# Patient Record
Sex: FEMALE | Race: WHITE | HISPANIC OR LATINO | Employment: FULL TIME | URBAN - METROPOLITAN AREA
[De-identification: names, ages, dates, MRNs, and addresses within clinical notes are randomized per-mention and may not be internally consistent; named-entity substitution may affect disease eponyms.]

---

## 2018-11-19 ENCOUNTER — TRANSCRIBE ORDERS (OUTPATIENT)
Dept: ADMINISTRATIVE | Facility: HOSPITAL | Age: 38
End: 2018-11-19

## 2018-11-19 DIAGNOSIS — N60.01 BREAST CYST, RIGHT: ICD-10-CM

## 2018-11-19 DIAGNOSIS — N63.0 BREAST LUMP: Primary | ICD-10-CM

## 2018-11-23 ENCOUNTER — HOSPITAL ENCOUNTER (OUTPATIENT)
Dept: ULTRASOUND IMAGING | Facility: CLINIC | Age: 38
Discharge: HOME | End: 2018-11-23
Payer: OTHER GOVERNMENT

## 2018-11-23 ENCOUNTER — HOSPITAL ENCOUNTER (OUTPATIENT)
Dept: MAMMOGRAPHY | Facility: CLINIC | Age: 38
Discharge: HOME | End: 2018-11-23
Payer: OTHER GOVERNMENT

## 2018-11-23 VITALS — HEIGHT: 64 IN | BODY MASS INDEX: 21.68 KG/M2 | WEIGHT: 127 LBS

## 2018-11-23 DIAGNOSIS — N63.0 BREAST LUMP: ICD-10-CM

## 2018-11-23 PROCEDURE — G0279 TOMOSYNTHESIS, MAMMO: HCPCS

## 2018-11-23 PROCEDURE — 76642 ULTRASOUND BREAST LIMITED: CPT

## 2018-11-23 PROCEDURE — 77066 DX MAMMO INCL CAD BI: CPT

## 2019-03-29 ENCOUNTER — OFFICE VISIT (OUTPATIENT)
Dept: URGENT CARE | Age: 39
End: 2019-03-29
Payer: COMMERCIAL

## 2019-03-29 VITALS
HEIGHT: 64 IN | WEIGHT: 133 LBS | TEMPERATURE: 97.7 F | HEART RATE: 74 BPM | BODY MASS INDEX: 22.71 KG/M2 | DIASTOLIC BLOOD PRESSURE: 71 MMHG | SYSTOLIC BLOOD PRESSURE: 125 MMHG | OXYGEN SATURATION: 100 %

## 2019-03-29 DIAGNOSIS — N63.0 BREAST LUMP IN FEMALE: Primary | ICD-10-CM

## 2019-03-29 PROCEDURE — S9088 SERVICES PROVIDED IN URGENT: HCPCS | Performed by: FAMILY MEDICINE

## 2019-03-29 PROCEDURE — 99212 OFFICE O/P EST SF 10 MIN: CPT | Performed by: FAMILY MEDICINE

## 2019-03-29 NOTE — PATIENT INSTRUCTIONS
Follow-up with gynecologist on 04/09/2019 is scheduled    Keep a diary of your symptoms in relation to her menstrual period

## 2019-03-30 NOTE — PROGRESS NOTES
[id number]        NAME: Regina Hernández is a 41 y o  female  : 1980    MRN: [id number]  DATE: 2019  TIME: 8:23 PM    Assessment and Plan   Breast lump in female [N63 0]  1  Breast lump in female           Patient Instructions       Follow up with PCP in 3-5 days  Proceed to  ER if symptoms worsen  Chief Complaint     Chief Complaint   Patient presents with    Mass     Lump on right breast with pain (only with pressure) and redness on the skin and swollen, noticed around 3-4 months ago  History of Present Illness       Patient is here for evaluation of persistent breast lump in the right breast   She states the other day was more swollen and tender but has gone down since  She has had this off and on since November  Patient did have an ultrasound the time and was supposed to follow-up in 6 months for another ultrasound  She states it did come back again and at this time it is no longer swollen or red or tender  Review of Systems   Review of Systems   Constitutional: Negative  Endocrine: Negative  Genitourinary: Negative  Hematological: Negative  Current Medications     No current outpatient medications on file  Current Allergies     Allergies as of 2019    (No Known Allergies)            The following portions of the patient's history were reviewed and updated as appropriate: allergies, current medications, past family history, past medical history, past social history, past surgical history and problem list      History reviewed  No pertinent past medical history  History reviewed  No pertinent surgical history  Family History   Problem Relation Age of Onset    Breast cancer Maternal Aunt 54         Medications have been verified          Objective   /71 (BP Location: Left arm, Patient Position: Sitting, Cuff Size: Standard)   Pulse 74   Temp 97 7 °F (36 5 °C)   Ht 5' 4" (1 626 m)   Wt 60 3 kg (133 lb)   SpO2 100%   BMI 22 83 kg/m²        Physical Exam     Physical Exam   Constitutional: She is oriented to person, place, and time  She appears well-developed  No distress  Neurological: She is alert and oriented to person, place, and time  Skin: Skin is warm and dry  She is not diaphoretic  Psychiatric: She has a normal mood and affect  Her behavior is normal    Nursing note and vitals reviewed

## 2019-04-08 ENCOUNTER — TELEPHONE (OUTPATIENT)
Dept: PSYCHIATRY | Facility: CLINIC | Age: 39
End: 2019-04-08

## 2019-05-06 ENCOUNTER — OFFICE VISIT (OUTPATIENT)
Dept: BEHAVIORAL/MENTAL HEALTH CLINIC | Facility: CLINIC | Age: 39
End: 2019-05-06
Payer: COMMERCIAL

## 2019-05-06 DIAGNOSIS — F32.0 CURRENT MILD EPISODE OF MAJOR DEPRESSIVE DISORDER WITHOUT PRIOR EPISODE (HCC): ICD-10-CM

## 2019-05-06 DIAGNOSIS — F41.0 PANIC DISORDER: ICD-10-CM

## 2019-05-06 DIAGNOSIS — F41.1 GAD (GENERALIZED ANXIETY DISORDER): Primary | ICD-10-CM

## 2019-05-06 PROCEDURE — 90792 PSYCH DIAG EVAL W/MED SRVCS: CPT | Performed by: PSYCHIATRY & NEUROLOGY

## 2019-05-06 RX ORDER — ESCITALOPRAM OXALATE 5 MG/1
5 TABLET ORAL DAILY
Qty: 45 TABLET | Refills: 0 | Status: SHIPPED | OUTPATIENT
Start: 2019-05-06 | End: 2019-06-03 | Stop reason: SDUPTHER

## 2019-05-06 RX ORDER — HYDROXYZINE HYDROCHLORIDE 25 MG/1
25 TABLET, FILM COATED ORAL 2 TIMES DAILY PRN
Qty: 20 TABLET | Refills: 0 | Status: SHIPPED | OUTPATIENT
Start: 2019-05-06 | End: 2019-12-24 | Stop reason: ALTCHOICE

## 2019-05-13 ENCOUNTER — HOSPITAL ENCOUNTER (OUTPATIENT)
Dept: ULTRASOUND IMAGING | Facility: CLINIC | Age: 39
Discharge: HOME | End: 2019-05-13
Payer: OTHER GOVERNMENT

## 2019-05-13 VITALS — WEIGHT: 133 LBS | BODY MASS INDEX: 22.71 KG/M2 | HEIGHT: 64 IN

## 2019-05-13 DIAGNOSIS — N60.01 BREAST CYST, RIGHT: ICD-10-CM

## 2019-05-13 PROCEDURE — 76642 ULTRASOUND BREAST LIMITED: CPT

## 2019-06-03 ENCOUNTER — OFFICE VISIT (OUTPATIENT)
Dept: BEHAVIORAL/MENTAL HEALTH CLINIC | Facility: CLINIC | Age: 39
End: 2019-06-03
Payer: COMMERCIAL

## 2019-06-03 DIAGNOSIS — F32.0 CURRENT MILD EPISODE OF MAJOR DEPRESSIVE DISORDER WITHOUT PRIOR EPISODE (HCC): ICD-10-CM

## 2019-06-03 DIAGNOSIS — F41.1 GAD (GENERALIZED ANXIETY DISORDER): Primary | ICD-10-CM

## 2019-06-03 DIAGNOSIS — F41.0 PANIC DISORDER: ICD-10-CM

## 2019-06-03 PROCEDURE — 99213 OFFICE O/P EST LOW 20 MIN: CPT | Performed by: PSYCHIATRY & NEUROLOGY

## 2019-06-03 RX ORDER — CLONAZEPAM 0.5 MG/1
0.5 TABLET ORAL DAILY PRN
Qty: 30 TABLET | Refills: 0 | Status: SHIPPED | OUTPATIENT
Start: 2019-06-03 | End: 2019-08-12 | Stop reason: SDUPTHER

## 2019-06-03 RX ORDER — ESCITALOPRAM OXALATE 10 MG/1
10 TABLET ORAL DAILY
Qty: 30 TABLET | Refills: 0 | Status: SHIPPED | OUTPATIENT
Start: 2019-06-03 | End: 2019-07-10 | Stop reason: SDUPTHER

## 2019-07-10 DIAGNOSIS — F41.0 PANIC DISORDER: ICD-10-CM

## 2019-07-10 DIAGNOSIS — F41.1 GAD (GENERALIZED ANXIETY DISORDER): ICD-10-CM

## 2019-07-10 DIAGNOSIS — F32.0 CURRENT MILD EPISODE OF MAJOR DEPRESSIVE DISORDER WITHOUT PRIOR EPISODE (HCC): ICD-10-CM

## 2019-07-10 RX ORDER — ESCITALOPRAM OXALATE 10 MG/1
10 TABLET ORAL DAILY
Qty: 30 TABLET | Refills: 0 | Status: SHIPPED | OUTPATIENT
Start: 2019-07-10 | End: 2019-08-12

## 2019-07-10 NOTE — TELEPHONE ENCOUNTER
Pt called to ask when her next appt is  She actually missed appt on 7/1, did not get a reminder and apologizes  She rescheduled 8/12 but will need lexapro refilled

## 2019-08-12 ENCOUNTER — OFFICE VISIT (OUTPATIENT)
Dept: BEHAVIORAL/MENTAL HEALTH CLINIC | Facility: CLINIC | Age: 39
End: 2019-08-12
Payer: COMMERCIAL

## 2019-08-12 DIAGNOSIS — F41.0 PANIC DISORDER: ICD-10-CM

## 2019-08-12 DIAGNOSIS — F32.0 CURRENT MILD EPISODE OF MAJOR DEPRESSIVE DISORDER WITHOUT PRIOR EPISODE (HCC): ICD-10-CM

## 2019-08-12 DIAGNOSIS — F41.1 GAD (GENERALIZED ANXIETY DISORDER): Primary | ICD-10-CM

## 2019-08-12 PROCEDURE — 99213 OFFICE O/P EST LOW 20 MIN: CPT | Performed by: PSYCHIATRY & NEUROLOGY

## 2019-08-12 RX ORDER — CLONAZEPAM 0.5 MG/1
0.5 TABLET ORAL DAILY PRN
Qty: 30 TABLET | Refills: 0 | Status: SHIPPED | OUTPATIENT
Start: 2019-08-12 | End: 2019-12-24 | Stop reason: SDUPTHER

## 2019-08-12 RX ORDER — ESCITALOPRAM OXALATE 10 MG/1
15 TABLET ORAL DAILY
Qty: 45 TABLET | Refills: 1 | Status: SHIPPED | OUTPATIENT
Start: 2019-08-12 | End: 2019-12-24 | Stop reason: SDUPTHER
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
## 2019-09-13 DIAGNOSIS — F41.0 PANIC DISORDER: ICD-10-CM

## 2019-09-13 DIAGNOSIS — F41.1 GAD (GENERALIZED ANXIETY DISORDER): ICD-10-CM

## 2019-12-16 ENCOUNTER — TELEPHONE (OUTPATIENT)
Dept: FAMILY MEDICINE CLINIC | Facility: CLINIC | Age: 39
End: 2019-12-16

## 2019-12-16 NOTE — TELEPHONE ENCOUNTER
She should maite refills from her current PCP as I am not allowed to refill meds for patients I havent seen before

## 2019-12-16 NOTE — TELEPHONE ENCOUNTER
Patient called and stated that she has a NP appointment on 2/3 with Soumya Thomas but she only has about 7 days left of her escitalopram (LEXAPRO) 10 mg tablet     That Dr Mayank Sanders prescribed, she stated that she she showed up to her appt with Lakeshia Beal but they told her that she just left and they didn't have a return date  She wanted to know if it would be safe to just stop the medication or if there was a way that Dr Love Headley can put in one refill till she is seen or if there was a way Dr Love Headley can squeeze her in sooner   Please Edmond Coates

## 2019-12-17 NOTE — TELEPHONE ENCOUNTER
Patient notified that you cannot fill her script, she said that she does not currently have a PCP  She said that she had an appt on 11/21 but it was cancelled by us   I rescheduled patient for 12/24 @ 8am

## 2019-12-24 ENCOUNTER — OFFICE VISIT (OUTPATIENT)
Dept: FAMILY MEDICINE CLINIC | Facility: CLINIC | Age: 39
End: 2019-12-24
Payer: COMMERCIAL

## 2019-12-24 ENCOUNTER — APPOINTMENT (OUTPATIENT)
Dept: LAB | Facility: CLINIC | Age: 39
End: 2019-12-24
Payer: COMMERCIAL

## 2019-12-24 VITALS
DIASTOLIC BLOOD PRESSURE: 80 MMHG | OXYGEN SATURATION: 99 % | TEMPERATURE: 97.2 F | RESPIRATION RATE: 16 BRPM | HEART RATE: 57 BPM | WEIGHT: 142.6 LBS | HEIGHT: 64 IN | SYSTOLIC BLOOD PRESSURE: 100 MMHG | BODY MASS INDEX: 24.34 KG/M2

## 2019-12-24 DIAGNOSIS — Z00.00 ANNUAL PHYSICAL EXAM: ICD-10-CM

## 2019-12-24 DIAGNOSIS — F32.0 CURRENT MILD EPISODE OF MAJOR DEPRESSIVE DISORDER WITHOUT PRIOR EPISODE (HCC): ICD-10-CM

## 2019-12-24 DIAGNOSIS — F41.1 GAD (GENERALIZED ANXIETY DISORDER): ICD-10-CM

## 2019-12-24 DIAGNOSIS — F41.0 PANIC DISORDER: ICD-10-CM

## 2019-12-24 DIAGNOSIS — Z00.00 ANNUAL PHYSICAL EXAM: Primary | ICD-10-CM

## 2019-12-24 LAB
ALBUMIN SERPL BCP-MCNC: 3.8 G/DL (ref 3.5–5)
ALP SERPL-CCNC: 51 U/L (ref 46–116)
ALT SERPL W P-5'-P-CCNC: 20 U/L (ref 12–78)
ANION GAP SERPL CALCULATED.3IONS-SCNC: 4 MMOL/L (ref 4–13)
AST SERPL W P-5'-P-CCNC: 8 U/L (ref 5–45)
BASOPHILS # BLD AUTO: 0.03 THOUSANDS/ΜL (ref 0–0.1)
BASOPHILS NFR BLD AUTO: 1 % (ref 0–1)
BILIRUB SERPL-MCNC: 0.44 MG/DL (ref 0.2–1)
BUN SERPL-MCNC: 23 MG/DL (ref 5–25)
CALCIUM SERPL-MCNC: 8.4 MG/DL (ref 8.3–10.1)
CHLORIDE SERPL-SCNC: 109 MMOL/L (ref 100–108)
CHOLEST SERPL-MCNC: 151 MG/DL (ref 50–200)
CO2 SERPL-SCNC: 28 MMOL/L (ref 21–32)
CREAT SERPL-MCNC: 0.82 MG/DL (ref 0.6–1.3)
EOSINOPHIL # BLD AUTO: 0.13 THOUSAND/ΜL (ref 0–0.61)
EOSINOPHIL NFR BLD AUTO: 3 % (ref 0–6)
ERYTHROCYTE [DISTWIDTH] IN BLOOD BY AUTOMATED COUNT: 12.3 % (ref 11.6–15.1)
GFR SERPL CREATININE-BSD FRML MDRD: 91 ML/MIN/1.73SQ M
GLUCOSE P FAST SERPL-MCNC: 96 MG/DL (ref 65–99)
HCT VFR BLD AUTO: 38.5 % (ref 34.8–46.1)
HDLC SERPL-MCNC: 72 MG/DL
HGB BLD-MCNC: 12.2 G/DL (ref 11.5–15.4)
IMM GRANULOCYTES # BLD AUTO: 0.01 THOUSAND/UL (ref 0–0.2)
IMM GRANULOCYTES NFR BLD AUTO: 0 % (ref 0–2)
LDLC SERPL CALC-MCNC: 65 MG/DL (ref 0–100)
LYMPHOCYTES # BLD AUTO: 1.78 THOUSANDS/ΜL (ref 0.6–4.47)
LYMPHOCYTES NFR BLD AUTO: 36 % (ref 14–44)
MCH RBC QN AUTO: 30.6 PG (ref 26.8–34.3)
MCHC RBC AUTO-ENTMCNC: 31.7 G/DL (ref 31.4–37.4)
MCV RBC AUTO: 97 FL (ref 82–98)
MONOCYTES # BLD AUTO: 0.48 THOUSAND/ΜL (ref 0.17–1.22)
MONOCYTES NFR BLD AUTO: 10 % (ref 4–12)
NEUTROPHILS # BLD AUTO: 2.49 THOUSANDS/ΜL (ref 1.85–7.62)
NEUTS SEG NFR BLD AUTO: 50 % (ref 43–75)
NRBC BLD AUTO-RTO: 0 /100 WBCS
PLATELET # BLD AUTO: 258 THOUSANDS/UL (ref 149–390)
PMV BLD AUTO: 11.1 FL (ref 8.9–12.7)
POTASSIUM SERPL-SCNC: 4.1 MMOL/L (ref 3.5–5.3)
PROT SERPL-MCNC: 7.1 G/DL (ref 6.4–8.2)
RBC # BLD AUTO: 3.99 MILLION/UL (ref 3.81–5.12)
SODIUM SERPL-SCNC: 141 MMOL/L (ref 136–145)
TRIGL SERPL-MCNC: 70 MG/DL
TSH SERPL DL<=0.05 MIU/L-ACNC: 1.83 UIU/ML (ref 0.36–3.74)
WBC # BLD AUTO: 4.92 THOUSAND/UL (ref 4.31–10.16)

## 2019-12-24 PROCEDURE — 85025 COMPLETE CBC W/AUTO DIFF WBC: CPT | Performed by: FAMILY MEDICINE

## 2019-12-24 PROCEDURE — 80061 LIPID PANEL: CPT

## 2019-12-24 PROCEDURE — 80053 COMPREHEN METABOLIC PANEL: CPT | Performed by: FAMILY MEDICINE

## 2019-12-24 PROCEDURE — 84443 ASSAY THYROID STIM HORMONE: CPT

## 2019-12-24 PROCEDURE — 99385 PREV VISIT NEW AGE 18-39: CPT | Performed by: FAMILY MEDICINE

## 2019-12-24 PROCEDURE — 36415 COLL VENOUS BLD VENIPUNCTURE: CPT | Performed by: FAMILY MEDICINE

## 2019-12-24 RX ORDER — CLONAZEPAM 0.5 MG/1
0.5 TABLET ORAL DAILY PRN
Qty: 30 TABLET | Refills: 0 | Status: SHIPPED | OUTPATIENT
Start: 2019-12-24 | End: 2020-03-17 | Stop reason: SDUPTHER

## 2019-12-24 RX ORDER — ESCITALOPRAM OXALATE 10 MG/1
10 TABLET ORAL DAILY
Qty: 90 TABLET | Refills: 3 | Status: SHIPPED | OUTPATIENT
Start: 2019-12-24 | End: 2020-08-03 | Stop reason: SDUPTHER
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
## 2019-12-24 NOTE — PATIENT INSTRUCTIONS

## 2020-03-17 DIAGNOSIS — F41.0 PANIC DISORDER: ICD-10-CM

## 2020-03-17 DIAGNOSIS — F41.1 GAD (GENERALIZED ANXIETY DISORDER): ICD-10-CM

## 2020-03-17 RX ORDER — CLONAZEPAM 0.5 MG/1
0.5 TABLET ORAL DAILY PRN
Qty: 30 TABLET | Refills: 0 | Status: SHIPPED | OUTPATIENT
Start: 2020-03-17 | End: 2020-06-01

## 2020-03-17 NOTE — TELEPHONE ENCOUNTER
Patient called script line requesting refill of clonazePAM (KlonoPIN) 0 5 mg tablet   It does not look like patient takes this regularly              06/03/2019  1  06/03/2019  CLONAZEPAM 0 5 MG TABLET  30 0  30  RA HAS      [facility] in [place]

## 2020-03-23 ENCOUNTER — TELEMEDICINE (OUTPATIENT)
Dept: FAMILY MEDICINE CLINIC | Facility: CLINIC | Age: 40
End: 2020-03-23
Payer: COMMERCIAL

## 2020-03-23 DIAGNOSIS — F41.1 GAD (GENERALIZED ANXIETY DISORDER): Primary | ICD-10-CM

## 2020-03-23 DIAGNOSIS — F32.0 CURRENT MILD EPISODE OF MAJOR DEPRESSIVE DISORDER WITHOUT PRIOR EPISODE (HCC): ICD-10-CM

## 2020-03-23 PROCEDURE — 99213 OFFICE O/P EST LOW 20 MIN: CPT | Performed by: FAMILY MEDICINE

## 2020-03-23 NOTE — PROGRESS NOTES
Virtual Regular Visit    Reason for visit is follow up anxiety and depression     Encounter provider Angie Andersen MD    Provider located at [facility]      Recent Visits  No visits were found meeting these conditions  Showing recent visits within past 7 days and meeting all other requirements     Future Appointments  No visits were found meeting these conditions  Showing future appointments within next 150 days and meeting all other requirements        After connecting through [employer], the patient was identified by name and date of birth  Briseida Blum was informed that this is a telemedicine visit and that the visit is being conducted through [employer] which may not be secure and therefore, might not be HIPAA-compliant  My office door was closed  No one else was in the room  She acknowledged consent and understanding of privacy and security of the video platform  The patient has agreed to participate and understands they can discontinue the visit at any time  Subjective  Briseida Blum is a 46 y o  female here for follow up anxiety   Feeling well overall on current meds   Sleeping well  Not suicidal or homicidal ideation     Review of Systems   Constitutional: Positive for irritability  Respiratory: Negative for shortness of breath  Cardiovascular: Negative for chest pain and palpitations  Gastrointestinal: Negative for nausea  Genitourinary: Negative for impotence  Neurological: Negative for dizziness  Psychiatric/Behavioral: Negative for confusion, decreased concentration and suicidal ideas  The patient is nervous/anxious              Past Medical History:   Diagnosis Date    Anxiety     No known health problems        Past Surgical History:   Procedure Laterality Date    NO PAST SURGERIES      TONSILLECTOMY      WISDOM TOOTH EXTRACTION         Current Outpatient Medications   Medication Sig Dispense Refill    clonazePAM (KlonoPIN) 0 5 mg tablet Take 1 tablet (0 5 mg total) by mouth daily as needed for anxiety 30 tablet 0    escitalopram (LEXAPRO) 10 mg tablet Take 1 tablet (10 mg total) by mouth daily 90 tablet 3     No current facility-administered medications for this visit  No Known Allergies    Thaischip was seen today for virtual regular visit  Diagnoses and all orders for this visit:    LIVIER (generalized anxiety disorder)  Comments:  doing well  Clonazepam PRN     Current mild episode of major depressive disorder without prior episode ([facility] [address] )  Comments:  doing well on lexapro  counseling done today        I spent 25 minutes with the patient during this visit   And naya chowdary 50% was spent counseling     [id number]

## 2020-05-30 DIAGNOSIS — F41.1 GAD (GENERALIZED ANXIETY DISORDER): ICD-10-CM

## 2020-05-30 DIAGNOSIS — F41.0 PANIC DISORDER: ICD-10-CM

## 2020-06-01 RX ORDER — CLONAZEPAM 0.5 MG/1
TABLET ORAL
Qty: 30 TABLET | Refills: 0 | Status: SHIPPED | OUTPATIENT
Start: 2020-06-01 | End: 2020-08-03 | Stop reason: SDUPTHER

## 2020-07-29 ENCOUNTER — TRANSCRIBE ORDERS (OUTPATIENT)
Dept: LAB | Facility: CLINIC | Age: 40
End: 2020-07-29

## 2020-07-29 ENCOUNTER — APPOINTMENT (OUTPATIENT)
Dept: LAB | Facility: CLINIC | Age: 40
End: 2020-07-29
Payer: COMMERCIAL

## 2020-07-29 DIAGNOSIS — Z11.3 SCREENING EXAMINATION FOR VENEREAL DISEASE: ICD-10-CM

## 2020-07-29 DIAGNOSIS — Z22.4 CARRIER OR SUSPECTED CARRIER OF GONORRHEA: ICD-10-CM

## 2020-07-29 DIAGNOSIS — Z31.41 FERTILITY TESTING: ICD-10-CM

## 2020-07-29 DIAGNOSIS — R76.0 RAISED ANTIBODY TITER: ICD-10-CM

## 2020-07-29 DIAGNOSIS — Z11.59 SCREENING EXAMINATION FOR POLIOMYELITIS: ICD-10-CM

## 2020-07-29 DIAGNOSIS — E28.2 POLYCYSTIC OVARIES: Primary | ICD-10-CM

## 2020-07-29 DIAGNOSIS — Z11.8 SCREENING EXAMINATION FOR TRACHOMA: ICD-10-CM

## 2020-07-29 DIAGNOSIS — D64.9 ANEMIA, UNSPECIFIED TYPE: ICD-10-CM

## 2020-07-29 DIAGNOSIS — E28.2 POLYCYSTIC OVARIES: ICD-10-CM

## 2020-07-29 DIAGNOSIS — Z01.83 ENCOUNTER FOR BLOOD TYPING: ICD-10-CM

## 2020-07-29 DIAGNOSIS — E07.9 DISEASE OF THYROID GLAND: ICD-10-CM

## 2020-07-29 DIAGNOSIS — Z31.441 ENCOUNTER FOR TESTING OF MALE PARTNER OF FEMALE WITH RECURRENT PREGNANCY LOSS: ICD-10-CM

## 2020-07-29 DIAGNOSIS — E66.8 OBESITY OF ENDOCRINE ORIGIN: ICD-10-CM

## 2020-07-29 LAB
ABO GROUP BLD: NORMAL
ALBUMIN SERPL BCP-MCNC: 4.1 G/DL (ref 3.5–5)
ALP SERPL-CCNC: 49 U/L (ref 46–116)
ALT SERPL W P-5'-P-CCNC: 21 U/L (ref 12–78)
ANION GAP SERPL CALCULATED.3IONS-SCNC: 11 MMOL/L (ref 4–13)
AST SERPL W P-5'-P-CCNC: 10 U/L (ref 5–45)
B-HCG SERPL-ACNC: <2 MIU/ML
BASOPHILS # BLD AUTO: 0.02 THOUSANDS/ΜL (ref 0–0.1)
BASOPHILS NFR BLD AUTO: 1 % (ref 0–1)
BILIRUB SERPL-MCNC: 0.58 MG/DL (ref 0.2–1)
BLD GP AB SCN SERPL QL: NEGATIVE
BUN SERPL-MCNC: 15 MG/DL (ref 5–25)
CALCIUM SERPL-MCNC: 8.3 MG/DL (ref 8.3–10.1)
CHLORIDE SERPL-SCNC: 105 MMOL/L (ref 100–108)
CHOLEST SERPL-MCNC: 156 MG/DL (ref 50–200)
CO2 SERPL-SCNC: 24 MMOL/L (ref 21–32)
CREAT SERPL-MCNC: 0.91 MG/DL (ref 0.6–1.3)
EOSINOPHIL # BLD AUTO: 0.07 THOUSAND/ΜL (ref 0–0.61)
EOSINOPHIL NFR BLD AUTO: 2 % (ref 0–6)
ERYTHROCYTE [DISTWIDTH] IN BLOOD BY AUTOMATED COUNT: 12.4 % (ref 11.6–15.1)
EST. AVERAGE GLUCOSE BLD GHB EST-MCNC: 108 MG/DL
GFR SERPL CREATININE-BSD FRML MDRD: 80 ML/MIN/1.73SQ M
GLUCOSE P FAST SERPL-MCNC: 87 MG/DL (ref 65–99)
HBA1C MFR BLD: 5.4 %
HBV CORE AB SER QL: NORMAL
HBV CORE IGM SER QL: NORMAL
HBV SURFACE AG SER QL: NORMAL
HCT VFR BLD AUTO: 40.8 % (ref 34.8–46.1)
HCV AB SER QL: NORMAL
HDLC SERPL-MCNC: 70 MG/DL
HGB BLD-MCNC: 13.3 G/DL (ref 11.5–15.4)
IMM GRANULOCYTES # BLD AUTO: 0.01 THOUSAND/UL (ref 0–0.2)
IMM GRANULOCYTES NFR BLD AUTO: 0 % (ref 0–2)
INSULIN SERPL-ACNC: 2.5 MU/L (ref 3–25)
LDLC SERPL CALC-MCNC: 77 MG/DL (ref 0–100)
LYMPHOCYTES # BLD AUTO: 1.21 THOUSANDS/ΜL (ref 0.6–4.47)
LYMPHOCYTES NFR BLD AUTO: 35 % (ref 14–44)
MCH RBC QN AUTO: 30.5 PG (ref 26.8–34.3)
MCHC RBC AUTO-ENTMCNC: 32.6 G/DL (ref 31.4–37.4)
MCV RBC AUTO: 94 FL (ref 82–98)
MONOCYTES # BLD AUTO: 0.31 THOUSAND/ΜL (ref 0.17–1.22)
MONOCYTES NFR BLD AUTO: 9 % (ref 4–12)
NEUTROPHILS # BLD AUTO: 1.85 THOUSANDS/ΜL (ref 1.85–7.62)
NEUTS SEG NFR BLD AUTO: 53 % (ref 43–75)
NONHDLC SERPL-MCNC: 86 MG/DL
NRBC BLD AUTO-RTO: 0 /100 WBCS
PLATELET # BLD AUTO: 239 THOUSANDS/UL (ref 149–390)
PMV BLD AUTO: 10.7 FL (ref 8.9–12.7)
POTASSIUM SERPL-SCNC: 3.4 MMOL/L (ref 3.5–5.3)
PROLACTIN SERPL-MCNC: 20.9 NG/ML
PROT SERPL-MCNC: 7.6 G/DL (ref 6.4–8.2)
RBC # BLD AUTO: 4.36 MILLION/UL (ref 3.81–5.12)
RH BLD: POSITIVE
RPR SER QL: NORMAL
RUBV IGG SERPL IA-ACNC: 42.2 IU/ML
SODIUM SERPL-SCNC: 140 MMOL/L (ref 136–145)
SPECIMEN EXPIRATION DATE: NORMAL
T3FREE SERPL-MCNC: 2.52 PG/ML (ref 2.3–4.2)
T4 FREE SERPL-MCNC: 1.02 NG/DL (ref 0.76–1.46)
TRIGL SERPL-MCNC: 46 MG/DL
TSH SERPL DL<=0.05 MIU/L-ACNC: 0.99 UIU/ML (ref 0.36–3.74)
VIT B12 SERPL-MCNC: 572 PG/ML (ref 100–900)
WBC # BLD AUTO: 3.47 THOUSAND/UL (ref 4.31–10.16)

## 2020-07-29 PROCEDURE — 84146 ASSAY OF PROLACTIN: CPT

## 2020-07-29 PROCEDURE — 83036 HEMOGLOBIN GLYCOSYLATED A1C: CPT

## 2020-07-29 PROCEDURE — 87661 TRICHOMONAS VAGINALIS AMPLIF: CPT

## 2020-07-29 PROCEDURE — 86376 MICROSOMAL ANTIBODY EACH: CPT

## 2020-07-29 PROCEDURE — 86631 CHLAMYDIA ANTIBODY: CPT

## 2020-07-29 PROCEDURE — 86704 HEP B CORE ANTIBODY TOTAL: CPT

## 2020-07-29 PROCEDURE — 86787 VARICELLA-ZOSTER ANTIBODY: CPT

## 2020-07-29 PROCEDURE — 83498 ASY HYDROXYPROGESTERONE 17-D: CPT

## 2020-07-29 PROCEDURE — 83525 ASSAY OF INSULIN: CPT

## 2020-07-29 PROCEDURE — 87491 CHLMYD TRACH DNA AMP PROBE: CPT

## 2020-07-29 PROCEDURE — 84402 ASSAY OF FREE TESTOSTERONE: CPT

## 2020-07-29 PROCEDURE — 84403 ASSAY OF TOTAL TESTOSTERONE: CPT

## 2020-07-29 PROCEDURE — 86645 CMV ANTIBODY IGM: CPT

## 2020-07-29 PROCEDURE — 86803 HEPATITIS C AB TEST: CPT

## 2020-07-29 PROCEDURE — 84443 ASSAY THYROID STIM HORMONE: CPT

## 2020-07-29 PROCEDURE — 86705 HEP B CORE ANTIBODY IGM: CPT

## 2020-07-29 PROCEDURE — 80061 LIPID PANEL: CPT

## 2020-07-29 PROCEDURE — 84702 CHORIONIC GONADOTROPIN TEST: CPT

## 2020-07-29 PROCEDURE — 80053 COMPREHEN METABOLIC PANEL: CPT

## 2020-07-29 PROCEDURE — 36415 COLL VENOUS BLD VENIPUNCTURE: CPT

## 2020-07-29 PROCEDURE — 80081 OBSTETRIC PANEL INC HIV TSTG: CPT

## 2020-07-29 PROCEDURE — 82627 DEHYDROEPIANDROSTERONE: CPT

## 2020-07-29 PROCEDURE — 82607 VITAMIN B-12: CPT

## 2020-07-29 PROCEDURE — 84439 ASSAY OF FREE THYROXINE: CPT

## 2020-07-29 PROCEDURE — 84481 FREE ASSAY (FT-3): CPT

## 2020-07-29 PROCEDURE — 86644 CMV ANTIBODY: CPT

## 2020-07-29 PROCEDURE — 86790 VIRUS ANTIBODY NOS: CPT

## 2020-07-29 PROCEDURE — 87591 N.GONORRHOEAE DNA AMP PROB: CPT

## 2020-07-30 LAB
C TRACH DNA SPEC QL NAA+PROBE: NEGATIVE
CMV IGG SERPL IA-ACNC: 0.87 U/ML (ref 0–0.59)
CMV IGM SERPL IA-ACNC: <30 AU/ML (ref 0–29.9)
DHEA-S SERPL-MCNC: 187 UG/DL (ref 57.3–279.2)
HTLV I+II AB SER QL IA: NEGATIVE
N GONORRHOEA DNA SPEC QL NAA+PROBE: NEGATIVE
TESTOST FREE SERPL-MCNC: 1.9 PG/ML (ref 0–4.2)
TESTOST SERPL-MCNC: 23 NG/DL (ref 8–48)
THYROPEROXIDASE AB SERPL-ACNC: <9 IU/ML (ref 0–34)
VZV IGG SER IA-ACNC: NORMAL

## 2020-07-31 LAB
CHLAMYDIA IGG SER-ACNC: <0.91 RATIO (ref 0–0.9)
HIV 1+2 AB+HIV1 P24 AG SERPL QL IA: NORMAL
T VAGINALIS RRNA SPEC QL NAA+PROBE: NEGATIVE

## 2020-08-02 LAB — 17OHP SERPL-MCNC: 191 NG/DL

## 2020-08-03 ENCOUNTER — OFFICE VISIT (OUTPATIENT)
Dept: FAMILY MEDICINE CLINIC | Facility: CLINIC | Age: 40
End: 2020-08-03
Payer: COMMERCIAL

## 2020-08-03 VITALS
DIASTOLIC BLOOD PRESSURE: 70 MMHG | RESPIRATION RATE: 18 BRPM | HEART RATE: 65 BPM | HEIGHT: 64 IN | BODY MASS INDEX: 23.32 KG/M2 | TEMPERATURE: 97.8 F | WEIGHT: 136.6 LBS | SYSTOLIC BLOOD PRESSURE: 92 MMHG | OXYGEN SATURATION: 98 %

## 2020-08-03 DIAGNOSIS — F41.0 PANIC DISORDER: ICD-10-CM

## 2020-08-03 DIAGNOSIS — F32.0 CURRENT MILD EPISODE OF MAJOR DEPRESSIVE DISORDER WITHOUT PRIOR EPISODE (HCC): Primary | ICD-10-CM

## 2020-08-03 DIAGNOSIS — F32.0 CURRENT MILD EPISODE OF MAJOR DEPRESSIVE DISORDER WITHOUT PRIOR EPISODE (HCC): ICD-10-CM

## 2020-08-03 DIAGNOSIS — F41.1 GAD (GENERALIZED ANXIETY DISORDER): ICD-10-CM

## 2020-08-03 PROCEDURE — 3008F BODY MASS INDEX DOCD: CPT | Performed by: FAMILY MEDICINE

## 2020-08-03 PROCEDURE — 99214 OFFICE O/P EST MOD 30 MIN: CPT | Performed by: FAMILY MEDICINE

## 2020-08-03 PROCEDURE — 1036F TOBACCO NON-USER: CPT | Performed by: FAMILY MEDICINE

## 2020-08-03 RX ORDER — CLONAZEPAM 0.5 MG/1
0.5 TABLET ORAL 2 TIMES DAILY PRN
Qty: 30 TABLET | Refills: 0 | Status: SHIPPED | OUTPATIENT
Start: 2020-08-03 | End: 2022-02-14

## 2020-08-03 RX ORDER — ESCITALOPRAM OXALATE 10 MG/1
10 TABLET ORAL DAILY
Qty: 90 TABLET | Refills: 3 | Status: SHIPPED | OUTPATIENT
Start: 2020-08-03 | End: 2022-02-14

## 2020-08-03 NOTE — PROGRESS NOTES
Assessment/Plan:    No problem-specific Assessment & Plan notes found for this encounter  Diagnoses and all orders for this visit:    Current mild episode of major depressive disorder without prior episode ([facility] [address] )  Comments:  lexapro as directed   Orders:  -     escitalopram (LEXAPRO) 10 mg tablet; Take 1 tablet (10 mg total) by mouth daily    LIVIER (generalized anxiety disorder)  -     clonazePAM (KlonoPIN) 0 5 mg tablet; Take 1 tablet (0 5 mg total) by mouth 2 (two) times a day as needed for seizures  -     escitalopram (LEXAPRO) 10 mg tablet; Take 1 tablet (10 mg total) by mouth daily    Panic disorder  -     clonazePAM (KlonoPIN) 0 5 mg tablet; Take 1 tablet (0 5 mg total) by mouth 2 (two) times a day as needed for seizures  -     escitalopram (LEXAPRO) 10 mg tablet; Take 1 tablet (10 mg total) by mouth daily    Current mild episode of major depressive disorder without prior episode ([facility])  -     escitalopram (LEXAPRO) 10 mg tablet; Take 1 tablet (10 mg total) by mouth daily          Subjective:      Patient ID: Earl Corbett is a 45 y o  female  Here for follow up  No libido for years even before the medications  Taking lexapro daily     Anxiety   Presents for follow-up visit  Symptoms include decreased concentration, depressed mood, excessive worry, irritability, muscle tension and nervous/anxious behavior  Patient reports no chest pain, compulsions, confusion, dizziness, dry mouth, feeling of choking, hyperventilation, impotence, insomnia, malaise, nausea, obsessions, palpitations, panic, restlessness, shortness of breath or suicidal ideas  Symptoms occur occasionally  The severity of symptoms is mild  The quality of sleep is good  Nighttime awakenings: occasional      Compliance with medications is %         The following portions of the patient's history were reviewed and updated as appropriate: allergies, current medications, past family history, past medical history, past social history, past surgical history and problem list     Review of Systems   Constitutional: Positive for irritability  Respiratory: Negative for shortness of breath  Cardiovascular: Negative for chest pain and palpitations  Gastrointestinal: Negative for nausea  Genitourinary: Negative for impotence  Neurological: Negative for dizziness  Psychiatric/Behavioral: Positive for decreased concentration  Negative for confusion and suicidal ideas  The patient is nervous/anxious  The patient does not have insomnia  Objective:      BP 92/70 (BP Location: Left arm, Patient Position: Sitting, Cuff Size: Standard)   Pulse 65   Temp 97 8 °F (36 6 °C) (Temporal)   Resp 18   Ht 5' 4"   Wt 62 kg (136 lb 9 6 oz)   SpO2 98%   BMI 23 45 kg/m²          Physical Exam   HENT:   Head: Normocephalic and atraumatic  Nose: No rhinorrhea or congestion  Cardiovascular: Normal rate and regular rhythm  Pulmonary/Chest: Effort normal and breath sounds normal    Abdominal: Normal appearance  Neurological: She is alert

## 2020-08-04 ENCOUNTER — OFFICE VISIT (OUTPATIENT)
Dept: OBGYN CLINIC | Facility: CLINIC | Age: 40
End: 2020-08-04
Payer: COMMERCIAL

## 2020-08-04 VITALS
DIASTOLIC BLOOD PRESSURE: 66 MMHG | BODY MASS INDEX: 23.82 KG/M2 | WEIGHT: 143 LBS | TEMPERATURE: 97.9 F | SYSTOLIC BLOOD PRESSURE: 110 MMHG | HEIGHT: 65 IN

## 2020-08-04 DIAGNOSIS — Z01.419 WELL FEMALE EXAM WITH ROUTINE GYNECOLOGICAL EXAM: Primary | ICD-10-CM

## 2020-08-04 DIAGNOSIS — Z11.51 SCREENING FOR HPV (HUMAN PAPILLOMAVIRUS): ICD-10-CM

## 2020-08-04 DIAGNOSIS — Z12.4 ENCOUNTER FOR SCREENING FOR CERVICAL CANCER: ICD-10-CM

## 2020-08-04 DIAGNOSIS — R68.82 DECREASED LIBIDO: ICD-10-CM

## 2020-08-04 PROCEDURE — 87624 HPV HI-RISK TYP POOLED RSLT: CPT | Performed by: OBSTETRICS & GYNECOLOGY

## 2020-08-04 PROCEDURE — G0145 SCR C/V CYTO,THINLAYER,RESCR: HCPCS | Performed by: OBSTETRICS & GYNECOLOGY

## 2020-08-04 PROCEDURE — 99385 PREV VISIT NEW AGE 18-39: CPT | Performed by: OBSTETRICS & GYNECOLOGY

## 2020-08-05 PROBLEM — R68.82 DECREASED LIBIDO: Status: ACTIVE | Noted: 2020-08-05
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
## 2020-08-07 ENCOUNTER — LAB REQUISITION (OUTPATIENT)
Dept: LAB | Facility: HOSPITAL | Age: 40
End: 2020-08-07
Payer: COMMERCIAL

## 2020-08-07 ENCOUNTER — TELEPHONE (OUTPATIENT)
Dept: OBGYN CLINIC | Facility: CLINIC | Age: 40
End: 2020-08-07

## 2020-08-07 DIAGNOSIS — F32.0 CURRENT MILD EPISODE OF MAJOR DEPRESSIVE DISORDER WITHOUT PRIOR EPISODE (HCC): ICD-10-CM

## 2020-08-07 DIAGNOSIS — F52.0 HYPOACTIVE SEXUAL DESIRE: Primary | ICD-10-CM

## 2020-08-07 DIAGNOSIS — Z11.8 ENCOUNTER FOR SCREENING FOR OTHER INFECTIOUS AND PARASITIC DISEASES: ICD-10-CM

## 2020-08-07 DIAGNOSIS — E28.0 ESTROGEN EXCESS: ICD-10-CM

## 2020-08-07 LAB
HPV HR 12 DNA CVX QL NAA+PROBE: NEGATIVE
HPV16 DNA CVX QL NAA+PROBE: NEGATIVE
HPV18 DNA CVX QL NAA+PROBE: NEGATIVE

## 2020-08-07 PROCEDURE — 86632 CHLAMYDIA IGM ANTIBODY: CPT | Performed by: OBSTETRICS & GYNECOLOGY

## 2020-08-07 NOTE — TELEPHONE ENCOUNTER
Left message on nurse line states at annual visit provider discussed starting Addyi,  Patient would like to start medication  Routing to provider to order

## 2020-08-10 PROCEDURE — 88305 TISSUE EXAM BY PATHOLOGIST: CPT | Performed by: PATHOLOGY

## 2020-08-10 PROCEDURE — 88342 IMHCHEM/IMCYTCHM 1ST ANTB: CPT | Performed by: PATHOLOGY

## 2020-08-10 RX ORDER — FLIBANSERIN 100 MG/1
100 TABLET, FILM COATED ORAL
Qty: 30 TABLET | Refills: 3 | Status: SHIPPED | OUTPATIENT
Start: 2020-08-10 | End: 2020-10-29

## 2020-08-10 NOTE — TELEPHONE ENCOUNTER
Called spoke with patient  She would like to try Addyi for her hypoactive sexual desire disorder  We reviewed that this medication can cause as significant decrease in blood pressure or in combination with alcohol  We reviewed that she is not drink alcohol within 2 hours of taking the medication  We reviewed that it can take a month to see improvement  We reviewed that this medication is taking nightly  Patient expressed understanding and agrees with the recommendations that go along with this medication  A prescription was sent to the pharmacy  She will make a 3 month follow-up visit

## 2020-08-11 ENCOUNTER — LAB REQUISITION (OUTPATIENT)
Dept: LAB | Facility: HOSPITAL | Age: 40
End: 2020-08-11
Payer: COMMERCIAL

## 2020-08-11 DIAGNOSIS — N71.1 CHRONIC INFLAMMATORY DISEASE OF UTERUS: ICD-10-CM

## 2020-08-11 DIAGNOSIS — E28.0 ESTROGEN EXCESS: ICD-10-CM

## 2020-08-11 LAB
C TRACH IGM TITR SER IF: <0.8 INDEX (ref 0–0.7)
LAB AP GYN PRIMARY INTERPRETATION: NORMAL
Lab: NORMAL

## 2020-10-29 ENCOUNTER — OFFICE VISIT (OUTPATIENT)
Dept: URGENT CARE | Facility: MEDICAL CENTER | Age: 40
End: 2020-10-29
Payer: COMMERCIAL

## 2020-10-29 VITALS
HEIGHT: 64 IN | HEART RATE: 74 BPM | WEIGHT: 140 LBS | TEMPERATURE: 98.4 F | DIASTOLIC BLOOD PRESSURE: 78 MMHG | SYSTOLIC BLOOD PRESSURE: 127 MMHG | OXYGEN SATURATION: 100 % | RESPIRATION RATE: 18 BRPM | BODY MASS INDEX: 23.9 KG/M2

## 2020-10-29 DIAGNOSIS — K12.1 STOMATITIS: Primary | ICD-10-CM

## 2020-10-29 PROCEDURE — G0382 LEV 3 HOSP TYPE B ED VISIT: HCPCS | Performed by: PHYSICIAN ASSISTANT

## 2020-11-05 RX ORDER — CLONAZEPAM 0.5 MG/1
TABLET ORAL
Qty: 30 TABLET | Refills: 0 | OUTPATIENT
Start: 2020-11-05

## 2020-12-04 ENCOUNTER — LAB REQUISITION (OUTPATIENT)
Dept: LAB | Facility: HOSPITAL | Age: 40
End: 2020-12-04
Payer: COMMERCIAL

## 2020-12-04 DIAGNOSIS — E28.0 ESTROGEN EXCESS: ICD-10-CM

## 2020-12-04 LAB
BASOPHILS # BLD AUTO: 0.02 THOUSANDS/ΜL (ref 0–0.1)
BASOPHILS NFR BLD AUTO: 0 % (ref 0–1)
EOSINOPHIL # BLD AUTO: 0.12 THOUSAND/ΜL (ref 0–0.61)
EOSINOPHIL NFR BLD AUTO: 2 % (ref 0–6)
ERYTHROCYTE [DISTWIDTH] IN BLOOD BY AUTOMATED COUNT: 12.7 % (ref 11.6–15.1)
HCT VFR BLD AUTO: 40.5 % (ref 34.8–46.1)
HGB BLD-MCNC: 12.6 G/DL (ref 11.5–15.4)
IMM GRANULOCYTES # BLD AUTO: 0.01 THOUSAND/UL (ref 0–0.2)
IMM GRANULOCYTES NFR BLD AUTO: 0 % (ref 0–2)
LYMPHOCYTES # BLD AUTO: 1.65 THOUSANDS/ΜL (ref 0.6–4.47)
LYMPHOCYTES NFR BLD AUTO: 34 % (ref 14–44)
MCH RBC QN AUTO: 30.4 PG (ref 26.8–34.3)
MCHC RBC AUTO-ENTMCNC: 31.1 G/DL (ref 31.4–37.4)
MCV RBC AUTO: 98 FL (ref 82–98)
MONOCYTES # BLD AUTO: 0.51 THOUSAND/ΜL (ref 0.17–1.22)
MONOCYTES NFR BLD AUTO: 10 % (ref 4–12)
NEUTROPHILS # BLD AUTO: 2.59 THOUSANDS/ΜL (ref 1.85–7.62)
NEUTS SEG NFR BLD AUTO: 54 % (ref 43–75)
NRBC BLD AUTO-RTO: 0 /100 WBCS
PLATELET # BLD AUTO: 286 THOUSANDS/UL (ref 149–390)
PMV BLD AUTO: 10.8 FL (ref 8.9–12.7)
RBC # BLD AUTO: 4.14 MILLION/UL (ref 3.81–5.12)
WBC # BLD AUTO: 4.9 THOUSAND/UL (ref 4.31–10.16)

## 2020-12-04 PROCEDURE — 85025 COMPLETE CBC W/AUTO DIFF WBC: CPT | Performed by: OBSTETRICS & GYNECOLOGY

## 2020-12-29 ENCOUNTER — LAB REQUISITION (OUTPATIENT)
Dept: LAB | Facility: HOSPITAL | Age: 40
End: 2020-12-29
Payer: COMMERCIAL

## 2020-12-29 DIAGNOSIS — Z31.41 ENCOUNTER FOR FERTILITY TESTING: ICD-10-CM

## 2020-12-29 DIAGNOSIS — E28.0 ESTROGEN EXCESS: ICD-10-CM

## 2020-12-29 LAB
BASOPHILS # BLD AUTO: 0.03 THOUSANDS/ΜL (ref 0–0.1)
BASOPHILS NFR BLD AUTO: 1 % (ref 0–1)
EOSINOPHIL # BLD AUTO: 0.08 THOUSAND/ΜL (ref 0–0.61)
EOSINOPHIL NFR BLD AUTO: 1 % (ref 0–6)
ERYTHROCYTE [DISTWIDTH] IN BLOOD BY AUTOMATED COUNT: 12.6 % (ref 11.6–15.1)
HCT VFR BLD AUTO: 38.4 % (ref 34.8–46.1)
HGB BLD-MCNC: 12.2 G/DL (ref 11.5–15.4)
IMM GRANULOCYTES # BLD AUTO: 0.03 THOUSAND/UL (ref 0–0.2)
IMM GRANULOCYTES NFR BLD AUTO: 1 % (ref 0–2)
LYMPHOCYTES # BLD AUTO: 1.65 THOUSANDS/ΜL (ref 0.6–4.47)
LYMPHOCYTES NFR BLD AUTO: 29 % (ref 14–44)
MCH RBC QN AUTO: 30.6 PG (ref 26.8–34.3)
MCHC RBC AUTO-ENTMCNC: 31.8 G/DL (ref 31.4–37.4)
MCV RBC AUTO: 96 FL (ref 82–98)
MONOCYTES # BLD AUTO: 0.44 THOUSAND/ΜL (ref 0.17–1.22)
MONOCYTES NFR BLD AUTO: 8 % (ref 4–12)
NEUTROPHILS # BLD AUTO: 3.38 THOUSANDS/ΜL (ref 1.85–7.62)
NEUTS SEG NFR BLD AUTO: 60 % (ref 43–75)
NRBC BLD AUTO-RTO: 0 /100 WBCS
PLATELET # BLD AUTO: 263 THOUSANDS/UL (ref 149–390)
PMV BLD AUTO: 10.9 FL (ref 8.9–12.7)
RBC # BLD AUTO: 3.99 MILLION/UL (ref 3.81–5.12)
WBC # BLD AUTO: 5.61 THOUSAND/UL (ref 4.31–10.16)

## 2020-12-29 PROCEDURE — 82397 CHEMILUMINESCENT ASSAY: CPT | Performed by: OBSTETRICS & GYNECOLOGY

## 2020-12-29 PROCEDURE — 85025 COMPLETE CBC W/AUTO DIFF WBC: CPT | Performed by: OBSTETRICS & GYNECOLOGY

## 2021-01-13 LAB — MISCELLANEOUS LAB TEST RESULT: NORMAL

## 2021-03-30 DIAGNOSIS — Z20.822 EXPOSURE TO COVID-19 VIRUS: Primary | ICD-10-CM

## 2021-03-30 PROCEDURE — U0003 INFECTIOUS AGENT DETECTION BY NUCLEIC ACID (DNA OR RNA); SEVERE ACUTE RESPIRATORY SYNDROME CORONAVIRUS 2 (SARS-COV-2) (CORONAVIRUS DISEASE [COVID-19]), AMPLIFIED PROBE TECHNIQUE, MAKING USE OF HIGH THROUGHPUT TECHNOLOGIES AS DESCRIBED BY CMS-2020-01-R: HCPCS | Performed by: FAMILY MEDICINE

## 2021-03-30 PROCEDURE — U0005 INFEC AGEN DETEC AMPLI PROBE: HCPCS | Performed by: FAMILY MEDICINE

## 2021-03-31 LAB — SARS-COV-2 RNA RESP QL NAA+PROBE: NEGATIVE

## 2021-12-20 DIAGNOSIS — Z20.822 EXPOSURE TO COVID-19 VIRUS: Primary | ICD-10-CM

## 2021-12-20 PROCEDURE — U0005 INFEC AGEN DETEC AMPLI PROBE: HCPCS | Performed by: FAMILY MEDICINE

## 2021-12-20 PROCEDURE — U0003 INFECTIOUS AGENT DETECTION BY NUCLEIC ACID (DNA OR RNA); SEVERE ACUTE RESPIRATORY SYNDROME CORONAVIRUS 2 (SARS-COV-2) (CORONAVIRUS DISEASE [COVID-19]), AMPLIFIED PROBE TECHNIQUE, MAKING USE OF HIGH THROUGHPUT TECHNOLOGIES AS DESCRIBED BY CMS-2020-01-R: HCPCS | Performed by: FAMILY MEDICINE

## 2021-12-21 LAB — SARS-COV-2 RNA RESP QL NAA+PROBE: NEGATIVE

## 2021-12-27 DIAGNOSIS — B34.9 VIRAL INFECTION, UNSPECIFIED: Primary | ICD-10-CM

## 2021-12-27 PROCEDURE — 87636 SARSCOV2 & INF A&B AMP PRB: CPT | Performed by: FAMILY MEDICINE

## 2022-01-24 ENCOUNTER — HOSPITAL ENCOUNTER (OUTPATIENT)
Dept: MAMMOGRAPHY | Facility: CLINIC | Age: 42
Discharge: HOME | End: 2022-01-24
Payer: COMMERCIAL

## 2022-01-24 VITALS — BODY MASS INDEX: 23.9 KG/M2 | HEIGHT: 64 IN | WEIGHT: 140 LBS

## 2022-01-24 DIAGNOSIS — Z12.31 VISIT FOR SCREENING MAMMOGRAM: ICD-10-CM

## 2022-01-24 PROCEDURE — 77067 SCR MAMMO BI INCL CAD: CPT

## 2022-01-24 PROCEDURE — 77063 BREAST TOMOSYNTHESIS BI: CPT

## 2022-01-25 ENCOUNTER — TELEPHONE (OUTPATIENT)
Dept: OTHER | Facility: OTHER | Age: 42
End: 2022-01-25

## 2022-01-25 NOTE — PROGRESS NOTES
Tete Farfan 9 minutes ago (10:41 AM)            Patient called today regarding a Follow up call from Being Covid Positive  Patient States she is doing much Better           Documentation

## 2022-01-25 NOTE — TELEPHONE ENCOUNTER
Patient called today regarding a Follow up call from Being Covid Positive  Patient States she is doing much Better

## 2022-02-14 ENCOUNTER — OFFICE VISIT (OUTPATIENT)
Dept: FAMILY MEDICINE CLINIC | Facility: CLINIC | Age: 42
End: 2022-02-14
Payer: COMMERCIAL

## 2022-02-14 VITALS
DIASTOLIC BLOOD PRESSURE: 82 MMHG | SYSTOLIC BLOOD PRESSURE: 120 MMHG | OXYGEN SATURATION: 100 % | TEMPERATURE: 99.8 F | HEART RATE: 74 BPM | WEIGHT: 139.2 LBS | HEIGHT: 65 IN | BODY MASS INDEX: 23.19 KG/M2 | RESPIRATION RATE: 12 BRPM

## 2022-02-14 DIAGNOSIS — L81.9 DISCOLORATION OF SKIN: ICD-10-CM

## 2022-02-14 DIAGNOSIS — Z00.00 ANNUAL PHYSICAL EXAM: Primary | ICD-10-CM

## 2022-02-14 PROCEDURE — 99396 PREV VISIT EST AGE 40-64: CPT | Performed by: FAMILY MEDICINE

## 2022-02-14 NOTE — PATIENT INSTRUCTIONS
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
## 2022-07-12 ENCOUNTER — INITIAL PRENATAL (OUTPATIENT)
Dept: OBGYN CLINIC | Facility: CLINIC | Age: 42
End: 2022-07-12

## 2022-07-12 ENCOUNTER — APPOINTMENT (OUTPATIENT)
Dept: LAB | Facility: CLINIC | Age: 42
End: 2022-07-12
Payer: COMMERCIAL

## 2022-07-12 VITALS — BODY MASS INDEX: 23.15 KG/M2 | WEIGHT: 135.6 LBS | HEIGHT: 64 IN

## 2022-07-12 DIAGNOSIS — Z3A.09 9 WEEKS GESTATION OF PREGNANCY: ICD-10-CM

## 2022-07-12 DIAGNOSIS — Z00.8 HEALTH EXAMINATION IN POPULATION SURVEY: ICD-10-CM

## 2022-07-12 DIAGNOSIS — O09.811 PREGNANCY RESULTING FROM IN VITRO FERTILIZATION IN FIRST TRIMESTER: Primary | ICD-10-CM

## 2022-07-12 DIAGNOSIS — O09.811 PREGNANCY RESULTING FROM IN VITRO FERTILIZATION IN FIRST TRIMESTER: ICD-10-CM

## 2022-07-12 LAB
ABO GROUP BLD: NORMAL
BASOPHILS # BLD AUTO: 0.05 THOUSANDS/ΜL (ref 0–0.1)
BASOPHILS NFR BLD AUTO: 1 % (ref 0–1)
BLD GP AB SCN SERPL QL: NEGATIVE
CHOLEST SERPL-MCNC: 155 MG/DL
EOSINOPHIL # BLD AUTO: 0.14 THOUSAND/ΜL (ref 0–0.61)
EOSINOPHIL NFR BLD AUTO: 3 % (ref 0–6)
ERYTHROCYTE [DISTWIDTH] IN BLOOD BY AUTOMATED COUNT: 12.6 % (ref 11.6–15.1)
EST. AVERAGE GLUCOSE BLD GHB EST-MCNC: 108 MG/DL
HBA1C MFR BLD: 5.4 %
HBV SURFACE AG SER QL: NORMAL
HCT VFR BLD AUTO: 38.7 % (ref 34.8–46.1)
HCV AB SER QL: NORMAL
HDLC SERPL-MCNC: 70 MG/DL
HGB BLD-MCNC: 12.7 G/DL (ref 11.5–15.4)
IMM GRANULOCYTES # BLD AUTO: 0.02 THOUSAND/UL (ref 0–0.2)
IMM GRANULOCYTES NFR BLD AUTO: 0 % (ref 0–2)
LDLC SERPL CALC-MCNC: 71 MG/DL (ref 0–100)
LYMPHOCYTES # BLD AUTO: 1.19 THOUSANDS/ΜL (ref 0.6–4.47)
LYMPHOCYTES NFR BLD AUTO: 22 % (ref 14–44)
MCH RBC QN AUTO: 30.7 PG (ref 26.8–34.3)
MCHC RBC AUTO-ENTMCNC: 32.8 G/DL (ref 31.4–37.4)
MCV RBC AUTO: 94 FL (ref 82–98)
MONOCYTES # BLD AUTO: 0.5 THOUSAND/ΜL (ref 0.17–1.22)
MONOCYTES NFR BLD AUTO: 9 % (ref 4–12)
NEUTROPHILS # BLD AUTO: 3.5 THOUSANDS/ΜL (ref 1.85–7.62)
NEUTS SEG NFR BLD AUTO: 65 % (ref 43–75)
NONHDLC SERPL-MCNC: 85 MG/DL
NRBC BLD AUTO-RTO: 0 /100 WBCS
PLATELET # BLD AUTO: 279 THOUSANDS/UL (ref 149–390)
PMV BLD AUTO: 10.1 FL (ref 8.9–12.7)
RBC # BLD AUTO: 4.14 MILLION/UL (ref 3.81–5.12)
RH BLD: POSITIVE
RPR SER QL: NORMAL
RUBV IGG SERPL IA-ACNC: 37.7 IU/ML
SPECIMEN EXPIRATION DATE: NORMAL
TRIGL SERPL-MCNC: 71 MG/DL
VZV IGG SER QL IA: NORMAL
WBC # BLD AUTO: 5.4 THOUSAND/UL (ref 4.31–10.16)

## 2022-07-12 PROCEDURE — 80081 OBSTETRIC PANEL INC HIV TSTG: CPT

## 2022-07-12 PROCEDURE — 86787 VARICELLA-ZOSTER ANTIBODY: CPT

## 2022-07-12 PROCEDURE — 83036 HEMOGLOBIN GLYCOSYLATED A1C: CPT

## 2022-07-12 PROCEDURE — 36415 COLL VENOUS BLD VENIPUNCTURE: CPT

## 2022-07-12 PROCEDURE — 87086 URINE CULTURE/COLONY COUNT: CPT

## 2022-07-12 PROCEDURE — OBC

## 2022-07-12 PROCEDURE — 86803 HEPATITIS C AB TEST: CPT

## 2022-07-12 PROCEDURE — 80061 LIPID PANEL: CPT

## 2022-07-12 NOTE — PROGRESS NOTES
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    Plan:  - Prenatal labs ordered   -first degree relative with type 2 diabetes- 1 hour glucose ordered  - Referral given for MFM  - Reviewed Genetic testing options  - Patient to call for concerns  - RTO 2 weeks for OB F/U visit and PAP/Cultures       OB History    Para Term  AB Living   3   0   2     SAB IAB Ectopic Multiple Live Births   2              # Outcome Date GA Lbr Jacky/2nd Weight Sex Delivery Anes PTL Lv   3 Current            2 SAB 2021           1 SAB               Obstetric Comments   Menarche 13     Hx of  delivery prior to 36 weeks 6 days: No  Last Menstrual Period:    Patient's last menstrual period was 2022  Ultrasound date: 22 8 weeks 0 days     Estimated Date of Delivery:  2023    Current Issues:  Constipation :   Yes, discussed hydration, high fiber diet and safe medication   Headaches :   No  Cramping:  No  Spotting :   No      Interview education   [facility] Pregnancy Essentials reviewed and discussed    Baby and [address] Handout   [facility] MFM Handout   Discussed genetic testing   Prenatal lab work: Scripts printed and given to pt   Influenza vaccine given today: No   Discussed Tdap vaccine     Immunizations:   Immunization History   Administered Date(s) Administered    H1N1, All Formulations 2010    Hep A / Hep B 2018, 2018    INFLUENZA 2010    TD (adult) Preservative Free 02/15/2015    Tdap 2014, 2015, 2015     Diabetes              Pregestational DM: No              hx of GDM: No              BMI >35: No              first degree relative with type 2 diabetes: Yes              hx of PCOS: No              current metformin use: No              prior hx of LGA/macrosomia: No                Hypertension              Hx of chronic HTN: No              hx of gestational HTN: No              hx of preeclampsia, eclampsia, or HELLP syndrome: No              Family h/o preeclampsia: No              Age 28 or older: Yes              Multifetal gestation: No  Type 1 or Type 2 DM: No  Renal Disease: No  Autoimmune disease (systemic lupus erythematosus, antiphospholipid antibody syndrome): No  Nulliparity: Yes  Obesity (BMI over 30): No  More than 10 year pregnancy interval: No  Previous IUGR, low birthweight or small for gestational age: No     Immunizations:              influenza vaccine: Declined, discussed recommendation               discussed Tdap vaccine administration at 27-28 weeks   Covid Vaccination: Declined, discussed recommendation        Dental visit with last 6 months - Yes  PHQ-2/9 score: 0      MyChart activated (not [age] years of age)?:  Yes    The patient was oriented to our practice and all questions were answered    Interviewed by: Emelina Simpson RN 07/12/22

## 2022-07-13 LAB
BACTERIA UR CULT: NORMAL
HIV 1+2 AB+HIV1 P24 AG SERPL QL IA: NORMAL

## 2022-07-17 ENCOUNTER — NURSE TRIAGE (OUTPATIENT)
Dept: OTHER | Facility: OTHER | Age: 42
End: 2022-07-17

## 2022-07-17 NOTE — TELEPHONE ENCOUNTER
Regarding: Pregnant spotting cramping  ----- Message from Mariana Vinson sent at 7/17/2022 10:30 AM EDT -----  "I am 10 weeks pregnant and I'm cramping and spotting "

## 2022-07-17 NOTE — TELEPHONE ENCOUNTER
Reason for Disposition   [1] Intermittent lower abdominal pain (e g , cramping) AND [2] present > 24 hours    Answer Assessment - Initial Assessment Questions  1  ONSET: "When did this bleeding start?"        Cramping started day before yesterday and spotting happened this morning   2  DESCRIPTION: "Describe the bleeding that you are having " "How much bleeding is there?"     - SPOTTING: spotting, or pinkish / brownish mucous discharge; does not fill panti-liner or pad     - MILD:  less than 1 pad / hour; less than patient's usual menstrual bleeding    - MODERATE: 1-2 pads / hour; 1 menstrual cup every 6 hours; small-medium blood clots (e g , pea, grape, small coin)    - SEVERE: soaking 2 or more pads/hour for 2 or more hours; 1 menstrual cup every 2 hours; bleeding not contained by pads or continuous red blood from vagina; large blood clots (e g , golf ball, large coin)       Pinkish   3  ABDOMINAL PAIN SEVERITY: If present, ask: "How bad is it?"  (e g , Scale 1-10; mild, moderate, or severe)    - MILD (1-3): doesn't interfere with normal activities, abdomen soft and not tender to touch     - MODERATE (4-7): interferes with normal activities or awakens from sleep, tender to touch     - SEVERE (8-10): excruciating pain, doubled over, unable to do any normal activities      4/10  4  PREGNANCY: "Do you know how many weeks or months pregnant you are?" "When was the first day of your last normal menstrual period?"      Yes 10 weeks   5  HEMODYNAMIC STATUS: "Are you weak or feeling lightheaded?" If Yes, ask: wnl   6   OTHER SYMPTOMS: "What other symptoms are you having with the bleeding?" (e g , passed tissue, vaginal discharge, fever, menstrual-type cramps)      Menstrual type cramps    Protocols used: PREGNANCY - VAGINAL BLEEDING LESS THAN 20 WEEKS EGA-ADULT-AH

## 2022-07-20 ENCOUNTER — APPOINTMENT (OUTPATIENT)
Dept: LAB | Facility: CLINIC | Age: 42
End: 2022-07-20
Payer: COMMERCIAL

## 2022-07-20 DIAGNOSIS — O09.811 PREGNANCY RESULTING FROM IN VITRO FERTILIZATION IN FIRST TRIMESTER: ICD-10-CM

## 2022-07-20 DIAGNOSIS — Z3A.09 9 WEEKS GESTATION OF PREGNANCY: ICD-10-CM

## 2022-07-20 LAB — GLUCOSE 1H P 50 G GLC PO SERPL-MCNC: 74 MG/DL (ref 40–134)

## 2022-07-20 PROCEDURE — 36415 COLL VENOUS BLD VENIPUNCTURE: CPT

## 2022-07-20 PROCEDURE — 82950 GLUCOSE TEST: CPT

## 2022-07-25 ENCOUNTER — ROUTINE PRENATAL (OUTPATIENT)
Dept: OBGYN CLINIC | Facility: CLINIC | Age: 42
End: 2022-07-25

## 2022-07-25 VITALS — BODY MASS INDEX: 23.69 KG/M2 | DIASTOLIC BLOOD PRESSURE: 62 MMHG | SYSTOLIC BLOOD PRESSURE: 110 MMHG | WEIGHT: 138 LBS

## 2022-07-25 DIAGNOSIS — Z3A.11 11 WEEKS GESTATION OF PREGNANCY: ICD-10-CM

## 2022-07-25 DIAGNOSIS — O09.811 PREGNANCY RESULTING FROM IN VITRO FERTILIZATION IN FIRST TRIMESTER: Primary | ICD-10-CM

## 2022-07-25 PROCEDURE — PNV: Performed by: PHYSICIAN ASSISTANT

## 2022-07-25 NOTE — PROGRESS NOTES
Patient is a 39 YO  female presenting to the office at 11 2 weeks for routine OB care  BP: 110/62  TWG: 3lb  Fetal Movement: none felt yet, movement present on US  Feeling well today   Denies LOF, CTX  Patient reports some spotting this AM which has already resolved, she denies cramping or pain     PAP and cultures are UTD  OK to transfuse and code  Has NT scheduled with MFM  Reviewed precautions  Call for concerns  RTO 4 weeks

## 2022-08-07 PROBLEM — O09.811 PREGNANCY RESULTING FROM IN VITRO FERTILIZATION, FIRST TRIMESTER: Status: ACTIVE | Noted: 2022-08-07

## 2022-08-07 PROBLEM — O09.521 ELDERLY MULTIGRAVIDA, FIRST TRIMESTER: Status: ACTIVE | Noted: 2022-08-07

## 2022-08-07 NOTE — PROGRESS NOTES
Please refer to the [facility] ultrasound report in Ob Procedures for additional information regarding today's visit

## 2022-08-08 ENCOUNTER — ROUTINE PRENATAL (OUTPATIENT)
Dept: PERINATAL CARE | Facility: CLINIC | Age: 42
End: 2022-08-08
Payer: COMMERCIAL

## 2022-08-08 VITALS
SYSTOLIC BLOOD PRESSURE: 117 MMHG | WEIGHT: 140 LBS | DIASTOLIC BLOOD PRESSURE: 64 MMHG | BODY MASS INDEX: 23.9 KG/M2 | HEIGHT: 64 IN | HEART RATE: 68 BPM

## 2022-08-08 DIAGNOSIS — O09.811 PREGNANCY RESULTING FROM IN VITRO FERTILIZATION IN FIRST TRIMESTER: ICD-10-CM

## 2022-08-08 DIAGNOSIS — Z3A.13 13 WEEKS GESTATION OF PREGNANCY: ICD-10-CM

## 2022-08-08 DIAGNOSIS — O09.521 ELDERLY MULTIGRAVIDA, FIRST TRIMESTER: Primary | ICD-10-CM

## 2022-08-08 PROCEDURE — 99243 OFF/OP CNSLTJ NEW/EST LOW 30: CPT | Performed by: OBSTETRICS & GYNECOLOGY

## 2022-08-08 PROCEDURE — 36415 COLL VENOUS BLD VENIPUNCTURE: CPT | Performed by: OBSTETRICS & GYNECOLOGY

## 2022-08-08 PROCEDURE — 76813 OB US NUCHAL MEAS 1 GEST: CPT | Performed by: OBSTETRICS & GYNECOLOGY

## 2022-08-08 PROCEDURE — 76801 OB US < 14 WKS SINGLE FETUS: CPT | Performed by: OBSTETRICS & GYNECOLOGY

## 2022-08-08 RX ORDER — ASPIRIN 81 MG/1
162 TABLET, CHEWABLE ORAL DAILY
Qty: 180 TABLET | Refills: 1 | Status: SHIPPED | OUTPATIENT
Start: 2022-08-08 | End: 2022-11-06

## 2022-08-08 NOTE — LETTER
August 8, 2022     [address] [place] [address]  [address]  [facility] [address]    Patient: Earl Corbett   YOB: 1980   Date of Visit: 8/8/2022       Dear Dr Mitchell Barajas:    Thank you for referring Earl Corbett to me for evaluation  Below are my notes for this consultation  If you have questions, please do not hesitate to call me  I look forward to following your patient along with you  Sincerely,        Belkis Edmonds MD        CC: No Recipients  Belkis Edmonds MD  8/7/2022  3:19 PM  Sign when Signing Visit  Please refer to the [facility] ultrasound report in Ob Procedures for additional information regarding today's visit

## 2022-08-22 ENCOUNTER — ROUTINE PRENATAL (OUTPATIENT)
Dept: OBGYN CLINIC | Facility: CLINIC | Age: 42
End: 2022-08-22

## 2022-08-22 ENCOUNTER — PATIENT MESSAGE (OUTPATIENT)
Dept: OBGYN CLINIC | Facility: CLINIC | Age: 42
End: 2022-08-22

## 2022-08-22 VITALS
OXYGEN SATURATION: 100 % | WEIGHT: 140 LBS | SYSTOLIC BLOOD PRESSURE: 98 MMHG | DIASTOLIC BLOOD PRESSURE: 54 MMHG | BODY MASS INDEX: 24.03 KG/M2 | HEART RATE: 72 BPM

## 2022-08-22 DIAGNOSIS — Z3A.15 15 WEEKS GESTATION OF PREGNANCY: ICD-10-CM

## 2022-08-22 DIAGNOSIS — O09.812 PREGNANCY RESULTING FROM IN VITRO FERTILIZATION IN SECOND TRIMESTER: ICD-10-CM

## 2022-08-22 DIAGNOSIS — O09.522 MULTIGRAVIDA OF ADVANCED MATERNAL AGE IN SECOND TRIMESTER: ICD-10-CM

## 2022-08-22 DIAGNOSIS — O09.522 AMA (ADVANCED MATERNAL AGE) MULTIGRAVIDA 35+, SECOND TRIMESTER: Primary | ICD-10-CM

## 2022-08-22 DIAGNOSIS — O09.812 PREGNANCY RESULTING FROM IN VITRO FERTILIZATION IN SECOND TRIMESTER: Primary | ICD-10-CM

## 2022-08-22 PROCEDURE — PNV: Performed by: PHYSICIAN ASSISTANT

## 2022-08-22 NOTE — PROGRESS NOTES
Patient is a 39 YO Andi George female presenting to the office at 15 2 weeks for routine OB care  BP: 98/54  TWlb  Fetal Movement: none felt yet  Feeling well today  Denies LOF, CTX, VB  Had Normal NT and NIPT  AFP ordered and patient aware of timing of test  Reviewed precautions  Call for concerns  RTO 4 weeks

## 2022-09-06 ENCOUNTER — APPOINTMENT (OUTPATIENT)
Dept: LAB | Facility: CLINIC | Age: 42
End: 2022-09-06
Payer: COMMERCIAL

## 2022-09-06 DIAGNOSIS — Z3A.15 15 WEEKS GESTATION OF PREGNANCY: ICD-10-CM

## 2022-09-06 DIAGNOSIS — O09.812 PREGNANCY RESULTING FROM IN VITRO FERTILIZATION IN SECOND TRIMESTER: ICD-10-CM

## 2022-09-06 DIAGNOSIS — O09.522 AMA (ADVANCED MATERNAL AGE) MULTIGRAVIDA 35+, SECOND TRIMESTER: ICD-10-CM

## 2022-09-06 PROCEDURE — 82105 ALPHA-FETOPROTEIN SERUM: CPT

## 2022-09-06 PROCEDURE — 36415 COLL VENOUS BLD VENIPUNCTURE: CPT

## 2022-09-08 LAB
2ND TRIMESTER 4 SCREEN SERPL-IMP: NORMAL
AFP ADJ MOM SERPL: 1.4
AFP INTERP AMN-IMP: NORMAL
AFP INTERP SERPL-IMP: NORMAL
AFP INTERP SERPL-IMP: NORMAL
AFP SERPL-MCNC: 61.4 NG/ML
AGE AT DELIVERY: 41.8 YR
GA METHOD: NORMAL
GA: 17.4 WEEKS
IDDM PATIENT QL: NO
MULTIPLE PREGNANCY: NO
NEURAL TUBE DEFECT RISK FETUS: 3600 %

## 2022-09-19 ENCOUNTER — ROUTINE PRENATAL (OUTPATIENT)
Dept: OBGYN CLINIC | Facility: CLINIC | Age: 42
End: 2022-09-19

## 2022-09-19 VITALS — WEIGHT: 144.2 LBS | SYSTOLIC BLOOD PRESSURE: 122 MMHG | DIASTOLIC BLOOD PRESSURE: 60 MMHG | BODY MASS INDEX: 24.75 KG/M2

## 2022-09-19 DIAGNOSIS — O09.522 ELDERLY MULTIGRAVIDA, SECOND TRIMESTER: ICD-10-CM

## 2022-09-19 DIAGNOSIS — O09.812 PREGNANCY RESULTING FROM IN VITRO FERTILIZATION IN SECOND TRIMESTER: Primary | ICD-10-CM

## 2022-09-19 PROBLEM — O09.811 PREGNANCY RESULTING FROM IN VITRO FERTILIZATION, FIRST TRIMESTER: Status: RESOLVED | Noted: 2022-08-07 | Resolved: 2022-09-19

## 2022-09-19 PROCEDURE — PNV: Performed by: OBSTETRICS & GYNECOLOGY

## 2022-09-19 NOTE — PROGRESS NOTES
This is a 40 y o   at 19w2d who presents for return OB visit  No complaints  Denies cramping, bleeding  Feeling flutters of movement  BP: 122/60 TWlb    Normal NIPT and AFP  Level 2 US scheduled for next week  Discussed ASA until 36 wks  Briefly discussed indication for 36 wk NSTs/delivery timing     F/up 4 wks

## 2022-09-26 ENCOUNTER — ROUTINE PRENATAL (OUTPATIENT)
Dept: PERINATAL CARE | Facility: OTHER | Age: 42
End: 2022-09-26
Payer: COMMERCIAL

## 2022-09-26 VITALS
WEIGHT: 145.72 LBS | BODY MASS INDEX: 24.88 KG/M2 | HEIGHT: 64 IN | SYSTOLIC BLOOD PRESSURE: 108 MMHG | HEART RATE: 75 BPM | DIASTOLIC BLOOD PRESSURE: 60 MMHG

## 2022-09-26 DIAGNOSIS — O09.812 PREGNANCY RESULTING FROM IN VITRO FERTILIZATION IN SECOND TRIMESTER: Primary | ICD-10-CM

## 2022-09-26 DIAGNOSIS — Z3A.20 20 WEEKS GESTATION OF PREGNANCY: ICD-10-CM

## 2022-09-26 DIAGNOSIS — Z36.86 ENCOUNTER FOR ANTENATAL SCREENING FOR CERVICAL LENGTH: ICD-10-CM

## 2022-09-26 DIAGNOSIS — O09.522 ELDERLY MULTIGRAVIDA, SECOND TRIMESTER: ICD-10-CM

## 2022-09-26 PROCEDURE — 99213 OFFICE O/P EST LOW 20 MIN: CPT | Performed by: OBSTETRICS & GYNECOLOGY

## 2022-09-26 PROCEDURE — 76811 OB US DETAILED SNGL FETUS: CPT | Performed by: OBSTETRICS & GYNECOLOGY

## 2022-09-26 PROCEDURE — 76817 TRANSVAGINAL US OBSTETRIC: CPT | Performed by: OBSTETRICS & GYNECOLOGY

## 2022-09-26 NOTE — PROGRESS NOTES
Ultrasound Probe Disinfection    A transvaginal ultrasound was performed  Prior to use, disinfection was performed with High Level Disinfection Process (Trophon)  Probe serial number A1: [id number] was used        Stella Araiza  09/26/22  9:19 AM

## 2022-09-26 NOTE — LETTER
September 26, 2022     Bob Garvin, [address] [address]    Patient: Domitila Carranza   YOB: 1980   Date of Visit: 9/26/2022       Dear Dr Corinne Luz:    Thank you for referring Domitila Carranza to me for evaluation  Below are my notes for this consultation  If you have questions, please do not hesitate to call me  I look forward to following your patient along with you  Sincerely,        Carmelita Castillo MD        CC: No Recipients  Carmelita Castillo MD  9/22/2022 11:52 AM  Sign when Signing Visit  Please refer to the [facility] ultrasound report in Ob Procedures for additional information regarding today's visit

## 2022-09-28 ENCOUNTER — TELEPHONE (OUTPATIENT)
Dept: PERINATAL CARE | Facility: OTHER | Age: 42
End: 2022-09-28

## 2022-09-28 NOTE — TELEPHONE ENCOUNTER
I spoke with Shahrzad Ascencio by phone this evening  She is planning to fly to [place] over the weekend  We discussed that she should hydrate well on the plain and she should ambulate frequently to reduce the chances for venous thromboembolism  I gave her the name of [facility] in [facility] which is able to take care of high risk pregnancy complications, should they occur

## 2022-10-20 ENCOUNTER — ROUTINE PRENATAL (OUTPATIENT)
Dept: OBGYN CLINIC | Facility: CLINIC | Age: 42
End: 2022-10-20

## 2022-10-20 VITALS — WEIGHT: 147 LBS | BODY MASS INDEX: 25.23 KG/M2 | DIASTOLIC BLOOD PRESSURE: 62 MMHG | SYSTOLIC BLOOD PRESSURE: 100 MMHG

## 2022-10-20 DIAGNOSIS — O09.812 PREGNANCY RESULTING FROM IN VITRO FERTILIZATION IN SECOND TRIMESTER: Primary | ICD-10-CM

## 2022-10-20 DIAGNOSIS — Z3A.23 23 WEEKS GESTATION OF PREGNANCY: ICD-10-CM

## 2022-10-20 DIAGNOSIS — O09.522 ELDERLY MULTIGRAVIDA, SECOND TRIMESTER: ICD-10-CM

## 2022-10-20 PROCEDURE — PNV: Performed by: OBSTETRICS & GYNECOLOGY

## 2022-10-20 NOTE — PROGRESS NOTES
40 y o    female at 20 8 wga EGA for PNV  BP : 100/62  TW  Feeling well   No complaints  Gave 28 week labs  Reviewed PTL precautions  Recommended Flu and covid vaccines - declines  F/u 4 weeks

## 2022-11-08 ENCOUNTER — APPOINTMENT (OUTPATIENT)
Dept: LAB | Facility: CLINIC | Age: 42
End: 2022-11-08

## 2022-11-08 DIAGNOSIS — O09.812 PREGNANCY RESULTING FROM IN VITRO FERTILIZATION IN SECOND TRIMESTER: ICD-10-CM

## 2022-11-08 DIAGNOSIS — R73.09 ELEVATED GLUCOSE: Primary | ICD-10-CM

## 2022-11-08 LAB
ERYTHROCYTE [DISTWIDTH] IN BLOOD BY AUTOMATED COUNT: 13.2 % (ref 11.6–15.1)
GLUCOSE 1H P 50 G GLC PO SERPL-MCNC: 143 MG/DL (ref 40–134)
HCT VFR BLD AUTO: 32 % (ref 34.8–46.1)
HGB BLD-MCNC: 10.4 G/DL (ref 11.5–15.4)
MCH RBC QN AUTO: 31.2 PG (ref 26.8–34.3)
MCHC RBC AUTO-ENTMCNC: 32.5 G/DL (ref 31.4–37.4)
MCV RBC AUTO: 96 FL (ref 82–98)
PLATELET # BLD AUTO: 222 THOUSANDS/UL (ref 149–390)
PMV BLD AUTO: 10.2 FL (ref 8.9–12.7)
RBC # BLD AUTO: 3.33 MILLION/UL (ref 3.81–5.12)
RPR SER QL: NORMAL
WBC # BLD AUTO: 6.14 THOUSAND/UL (ref 4.31–10.16)

## 2022-11-10 ENCOUNTER — APPOINTMENT (OUTPATIENT)
Dept: LAB | Facility: CLINIC | Age: 42
End: 2022-11-10

## 2022-11-10 DIAGNOSIS — R73.09 ELEVATED GLUCOSE: ICD-10-CM

## 2022-11-10 LAB
GLUCOSE 1H P 100 G GLC PO SERPL-MCNC: 114 MG/DL (ref 70–183)
GLUCOSE 2H P 100 G GLC PO SERPL-MCNC: 80 MG/DL (ref 70–155)
GLUCOSE 3H P 100 G GLC PO SERPL-MCNC: 80 MG/DL (ref 70–140)
GLUCOSE P FAST SERPL-MCNC: 89 MG/DL (ref 65–94)

## 2022-11-15 ENCOUNTER — ROUTINE PRENATAL (OUTPATIENT)
Dept: OBGYN CLINIC | Facility: CLINIC | Age: 42
End: 2022-11-15

## 2022-11-15 VITALS — WEIGHT: 153 LBS | SYSTOLIC BLOOD PRESSURE: 112 MMHG | DIASTOLIC BLOOD PRESSURE: 62 MMHG | BODY MASS INDEX: 26.26 KG/M2

## 2022-11-15 DIAGNOSIS — O09.522 AMA (ADVANCED MATERNAL AGE) MULTIGRAVIDA 35+, SECOND TRIMESTER: ICD-10-CM

## 2022-11-15 DIAGNOSIS — O09.812 PREGNANCY RESULTING FROM IN VITRO FERTILIZATION IN SECOND TRIMESTER: Primary | ICD-10-CM

## 2022-11-15 DIAGNOSIS — Z23 NEED FOR TDAP VACCINATION: ICD-10-CM

## 2022-11-15 NOTE — PROGRESS NOTES
Patient is a 41 YO  female presenting to the office at 27 3 weeks for routine OB care  BP: 112/62  TWlb  Fetal Movement: yes good movement  Feeling well today  Denies LOF, CTX, VB  Discussed third trimester teaching  Reviewed fetal kick counts, normal FM  Reviewed signs of PTL  Reviewed red folder, consents, birth plan  Delivery consent obtained   Breastfeeding: plans to  Breast Pump: ordered  TDAP: received today  FLU Vaccine: declined  COVID Vaccine: recommend  28 Week Labs: WNL, hemoglobin 10 4, constipated   Recommend increasing dietary iron  RTO 2 weeks for routine OB F/U

## 2022-11-28 ENCOUNTER — ROUTINE PRENATAL (OUTPATIENT)
Dept: OBGYN CLINIC | Facility: CLINIC | Age: 42
End: 2022-11-28

## 2022-11-28 VITALS — BODY MASS INDEX: 26.19 KG/M2 | SYSTOLIC BLOOD PRESSURE: 110 MMHG | DIASTOLIC BLOOD PRESSURE: 60 MMHG | WEIGHT: 152.6 LBS

## 2022-11-28 DIAGNOSIS — O09.813 PREGNANCY RESULTING FROM IN VITRO FERTILIZATION IN THIRD TRIMESTER: Primary | ICD-10-CM

## 2022-11-28 NOTE — PROGRESS NOTES
Pt here for a routine prenatal    Denies fluid and blood leakage  Overall, pt is well, pt c/o donald Yip

## 2022-11-28 NOTE — PROGRESS NOTES
40 y o   female at 29w2d (Estimated Date of Delivery: 23) for PNV      Pre-Brittany Vitals    Flowsheet Row Most Recent Value   Prenatal Assessment    Movement Present   Prenatal Vitals    Blood Pressure 110/60   Weight - Scale 69 2 kg (152 lb 9 6 oz)   Urine Albumin/Glucose    Dilation/Effacement/Station    Vaginal Drainage    Edema    LLE Edema None   RLE Edema None   Facial Edema None         kg (17 lb 9 6 oz)  Issues with constipation- tried increasing fiber, po hydration, colace  Encouraged to try MOM, supp or enema  MFM u/s next month  Passed 3hr GTT

## 2022-12-03 LAB
DME PARACHUTE DELIVERY DATE REQUESTED: NORMAL
DME PARACHUTE ITEM DESCRIPTION: NORMAL
DME PARACHUTE ORDER STATUS: NORMAL
DME PARACHUTE SUPPLIER NAME: NORMAL
DME PARACHUTE SUPPLIER PHONE: NORMAL

## 2022-12-12 ENCOUNTER — ROUTINE PRENATAL (OUTPATIENT)
Dept: OBGYN CLINIC | Facility: CLINIC | Age: 42
End: 2022-12-12

## 2022-12-12 VITALS — DIASTOLIC BLOOD PRESSURE: 68 MMHG | SYSTOLIC BLOOD PRESSURE: 108 MMHG | WEIGHT: 155 LBS | BODY MASS INDEX: 26.61 KG/M2

## 2022-12-12 DIAGNOSIS — Z3A.31 31 WEEKS GESTATION OF PREGNANCY: ICD-10-CM

## 2022-12-12 DIAGNOSIS — O99.013 ANEMIA DURING PREGNANCY IN THIRD TRIMESTER: ICD-10-CM

## 2022-12-12 DIAGNOSIS — O09.813 PREGNANCY RESULTING FROM IN VITRO FERTILIZATION IN THIRD TRIMESTER: Primary | ICD-10-CM

## 2022-12-12 PROBLEM — Z3A.23 23 WEEKS GESTATION OF PREGNANCY: Status: RESOLVED | Noted: 2022-10-20 | Resolved: 2022-12-12

## 2022-12-12 NOTE — PROGRESS NOTES
patient would like to discuss iron levels, it was recommended she start taking iron supplements but they caused constipation so they changed up foods she was eating to help  Now they are just wondering how to tell if levels are ok  Otherwise she is doing well, she has no concerns  Urine dip neg/neg

## 2022-12-12 NOTE — PROGRESS NOTES
40 y o   female at 31w2d (Estimated Date of Delivery: 23) for PNV  Pre- Vitals    Flowsheet Row Most Recent Value   Prenatal Assessment    Movement Present   Prenatal Vitals    Blood Pressure 108/68   Weight - Scale 70 3 kg (155 lb)   Urine Albumin/Glucose    Dilation/Effacement/Station    Vaginal Drainage    Edema    LLE Edema None   RLE Edema None   Facial Edema None         kg (20 lb)  Doing well 3rd trimester  BM are more regular  Pt  Has changed diet to increase iron  Repeat cbc with iron panel ordered

## 2022-12-19 ENCOUNTER — ULTRASOUND (OUTPATIENT)
Facility: HOSPITAL | Age: 42
End: 2022-12-19

## 2022-12-19 VITALS
HEIGHT: 64 IN | SYSTOLIC BLOOD PRESSURE: 106 MMHG | BODY MASS INDEX: 26.46 KG/M2 | HEART RATE: 86 BPM | WEIGHT: 155 LBS | DIASTOLIC BLOOD PRESSURE: 68 MMHG

## 2022-12-19 DIAGNOSIS — O09.513 PRIMIGRAVIDA OF ADVANCED MATERNAL AGE IN THIRD TRIMESTER: ICD-10-CM

## 2022-12-19 DIAGNOSIS — Z36.89 ENCOUNTER FOR ULTRASOUND TO CHECK FETAL GROWTH: ICD-10-CM

## 2022-12-19 DIAGNOSIS — O09.813 PREGNANCY RESULTING FROM IN VITRO FERTILIZATION IN THIRD TRIMESTER: Primary | ICD-10-CM

## 2022-12-19 NOTE — PROGRESS NOTES
[address]: Ms Debra Jimenez was seen today for fetal growth and followup missed anatomy ultrasound  See ultrasound report under "OB Procedures" tab  The time spent on this established patient on the encounter date included 4 minutes previsit service time reviewing records and precharting, 6 minutes face-to-face service time counseling regarding results and coordinating care, and  5 minutes charting, totalling 15 minutes    Please don't hesitate to contact our office with any concerns or questions   -True Ordaz MD

## 2022-12-19 NOTE — PATIENT INSTRUCTIONS
Kick Counts in Pregnancy   Kick counts  measure how much your baby is moving in your womb  A kick from your baby can be felt as a twist, turn, swish, roll, or jab  It is common to feel your baby kicking at 26 to 28 weeks of pregnancy  You may feel your baby kick as early as 20 weeks of pregnancy  Seek care immediately if:   You feel your baby kick less as the day goes on  You do not feel any kicks in a day  Contact your healthcare provider if:   You feel a change in the number of kicks or movements of your baby  You feel fewer than 10 kicks within 2 hours after counting twice  You have questions or concerns about your baby's movements  Why measure kick counts:  Your baby's movement may provide information about your baby's health  He may move less, or not at all, if there are problems  He may move less if he does not have enough room to grow in your uterus (womb)  He may also move less if he is not getting enough oxygen or nutrition from the placenta  Tell your healthcare provider as soon as you feel a change in your baby's movements  Problems that are found earlier are easier to treat  When to measure kick counts:   Measure kick counts at the same time every day  Measure kick counts when your baby is awake and most active  Your baby may be most active in the evening  Measure kick counts after a meal or snack  Your baby may be more active after you eat  Wait 2 hours after you drink liquids that contain caffeine  Caffeine can make your baby more active than usual   You should not smoke while you are pregnant  Smoking increases the risk of health problems for you and for your baby during your pregnancy  If you do smoke, wait 2 hours to measure kick counts  Nicotine can make your baby more active than usual     How to measure kick counts:  Check that your baby is awake before you measure kick counts  You can wake up your baby by lightly pushing on your belly, walking, or drinking something cold  Your healthcare provider may tell you different ways to measure kick counts  He may tell you to do the following:  Use a chart or clock to keep track of the time you start and finish counting  Sit in a chair or lie on your left side  Place your hands on the largest part of your belly  Count until you reach 10 kicks  Write down how much time it takes to count 10 kicks  It may take 30 minutes to 2 hours to count 10 kicks  It should not take more than 2 hours to count 10 kicks  If you do not feel 10 kicks within 2 hours, wait 1 hour and count again  Your baby can sleep for up to 40 minutes at one time

## 2022-12-27 ENCOUNTER — ULTRASOUND (OUTPATIENT)
Facility: HOSPITAL | Age: 42
End: 2022-12-27

## 2022-12-27 ENCOUNTER — APPOINTMENT (OUTPATIENT)
Dept: LAB | Facility: CLINIC | Age: 42
End: 2022-12-27

## 2022-12-27 ENCOUNTER — ROUTINE PRENATAL (OUTPATIENT)
Dept: OBGYN CLINIC | Facility: CLINIC | Age: 42
End: 2022-12-27

## 2022-12-27 VITALS
HEIGHT: 64 IN | SYSTOLIC BLOOD PRESSURE: 118 MMHG | HEART RATE: 95 BPM | DIASTOLIC BLOOD PRESSURE: 68 MMHG | WEIGHT: 158.2 LBS | BODY MASS INDEX: 27.01 KG/M2

## 2022-12-27 VITALS — DIASTOLIC BLOOD PRESSURE: 80 MMHG | SYSTOLIC BLOOD PRESSURE: 110 MMHG | BODY MASS INDEX: 26.61 KG/M2 | WEIGHT: 155 LBS

## 2022-12-27 DIAGNOSIS — O09.523 AMA (ADVANCED MATERNAL AGE) MULTIGRAVIDA 35+, THIRD TRIMESTER: ICD-10-CM

## 2022-12-27 DIAGNOSIS — Z3A.33 33 WEEKS GESTATION OF PREGNANCY: ICD-10-CM

## 2022-12-27 DIAGNOSIS — Z3A.33 33 WEEKS GESTATION OF PREGNANCY: Primary | ICD-10-CM

## 2022-12-27 DIAGNOSIS — O09.813 PREGNANCY RESULTING FROM IN VITRO FERTILIZATION IN THIRD TRIMESTER: Primary | ICD-10-CM

## 2022-12-27 DIAGNOSIS — O99.013 ANEMIA DURING PREGNANCY IN THIRD TRIMESTER: ICD-10-CM

## 2022-12-27 DIAGNOSIS — O09.813 PREGNANCY RESULTING FROM IN VITRO FERTILIZATION IN THIRD TRIMESTER: ICD-10-CM

## 2022-12-27 LAB
BASOPHILS # BLD AUTO: 0.05 THOUSANDS/ÂΜL (ref 0–0.1)
BASOPHILS NFR BLD AUTO: 1 % (ref 0–1)
EOSINOPHIL # BLD AUTO: 0.03 THOUSAND/ÂΜL (ref 0–0.61)
EOSINOPHIL NFR BLD AUTO: 0 % (ref 0–6)
ERYTHROCYTE [DISTWIDTH] IN BLOOD BY AUTOMATED COUNT: 12.9 % (ref 11.6–15.1)
FERRITIN SERPL-MCNC: 9 NG/ML (ref 8–388)
HCT VFR BLD AUTO: 33 % (ref 34.8–46.1)
HGB BLD-MCNC: 10.8 G/DL (ref 11.5–15.4)
IMM GRANULOCYTES # BLD AUTO: 0.13 THOUSAND/UL (ref 0–0.2)
IMM GRANULOCYTES NFR BLD AUTO: 2 % (ref 0–2)
IRON SATN MFR SERPL: 14 % (ref 15–50)
IRON SERPL-MCNC: 78 UG/DL (ref 50–170)
LYMPHOCYTES # BLD AUTO: 1.26 THOUSANDS/ÂΜL (ref 0.6–4.47)
LYMPHOCYTES NFR BLD AUTO: 17 % (ref 14–44)
MCH RBC QN AUTO: 31.2 PG (ref 26.8–34.3)
MCHC RBC AUTO-ENTMCNC: 32.7 G/DL (ref 31.4–37.4)
MCV RBC AUTO: 95 FL (ref 82–98)
MONOCYTES # BLD AUTO: 0.54 THOUSAND/ÂΜL (ref 0.17–1.22)
MONOCYTES NFR BLD AUTO: 7 % (ref 4–12)
NEUTROPHILS # BLD AUTO: 5.38 THOUSANDS/ÂΜL (ref 1.85–7.62)
NEUTS SEG NFR BLD AUTO: 73 % (ref 43–75)
NRBC BLD AUTO-RTO: 0 /100 WBCS
PLATELET # BLD AUTO: 227 THOUSANDS/UL (ref 149–390)
PMV BLD AUTO: 10.4 FL (ref 8.9–12.7)
RBC # BLD AUTO: 3.46 MILLION/UL (ref 3.81–5.12)
TIBC SERPL-MCNC: 539 UG/DL (ref 250–450)
WBC # BLD AUTO: 7.39 THOUSAND/UL (ref 4.31–10.16)

## 2022-12-27 NOTE — PROGRESS NOTES
Please refer to the [facility] ultrasound report in Ob Procedures for additional information regarding today's visit

## 2022-12-27 NOTE — PROGRESS NOTES
Pt is here for her 33w prenatal  pt denies any swelling at this time  Pt doping well no concerns at this time

## 2022-12-27 NOTE — PROGRESS NOTES
40 y o   female at 33w3d (Estimated Date of Delivery: 23) for PNV  Pre-Brittany Vitals    Flowsheet Row Most Recent Value   Prenatal Assessment    Fetal Heart Rate 150   Movement Present   Prenatal Vitals    Blood Pressure 110/80   Weight - Scale 70 3 kg (155 lb)   Urine Albumin/Glucose    Dilation/Effacement/Station    Vaginal Drainage    Edema          kg (20 lb)  Denies contractions/lof or bleeding  Good FM  It's a BOY! Fetal echo scheduled  She is starting NST/MILLIE at [facility] today, will be done weekly  RTO in 2 weeks

## 2022-12-27 NOTE — LETTER
NST sleeve cover sheet    Patient name: Gonzalo Borja  : 1980  MRN: [id number]    MELISSA: Estimated Date of Delivery: 23    Obstetrician: [facility]) for testing:  sourav Oden  __________________________________________      Testing frequency:    ___ 2x/wk  __X_ 1x/wk  ___ Dopplers  ___ BPP?       Last growth scan: __________________________________________

## 2022-12-27 NOTE — LETTER
December 27, 2022     [address] [place] [address]  [address]  [facility] [address]    Patient: Zeinab Tovar   YOB: 1980   Date of Visit: 12/27/2022       Dear Dr Juliet Blanchard:    Thank you for referring Zeinab Tovar to me for evaluation  Below are my notes for this consultation  If you have questions, please do not hesitate to call me  I look forward to following your patient along with you           Sincerely,        Jacinda Miller MD        CC: No Recipients  Jacinda Miller MD  12/27/2022  2:44 PM  Sign when Signing Visit  Please refer to the [facility] ultrasound report in Ob Procedures for additional information regarding today's visit

## 2022-12-27 NOTE — PROGRESS NOTES
Non-Stress Testing:    Non-Stress test, equipment, procedure, and expected outcomes reviewed  Reviewed fetal kick counts and when to call OB  Eboni Donnelly Verified patient understanding of fetal kick counts with teach back method  Patient reports feeling daily fetal movements  Patient has no questions or concerns  Dr Nasir Orantes reviewed NST strip via [facility] Text  Nurse Triage SBAR    Situation: Ivy Teague has questions about care advice given per protocol. Patient states Patient states she had blood transfusion on 7/24 (4 units) and has felt worsening of symptoms. Pt reports feeling extremely fatigued and sluggish, hot/cold/sweaty but states temp was 97/7, nauseated, body aches especially in lower back worse than usual. and requesting/needing  reassurance that this is normal and will take time or timeline of how long this will take to resolve.    Background: Anemia, recent blood transfusion, worsening of symptoms, Recent increase of Lisinopril 10 mg to 20 mg     Assessment: Pt is currently at work and states her coloring looks better but she feels more tired and run down since transfusion. Denies fever, rashes, difficulty breathing.    (See information below for more triage details.)    Recommendation: Routing to provider for recommendation on normal course of symptoms following transfusion.    Protocol Recommended Disposition: Telephone advice Pt instructed to continue fluids, monitoring temperature, and to limit activity until symptoms subside.     Nurse Triage Follow Up: Patient informed of recommendations and reasons to call back or seek care in the Clinic. Patient verbalized understanding and agrees with the plan.    Will call patient back after provider recommendation given.     Additional Information    Negative: Severe difficulty breathing (e.g., struggling for each breath, speaks in single words)    Negative: Shock suspected (e.g., cold/pale/clammy skin, too weak to stand, low BP, rapid pulse)    Negative: Difficult to awaken or acting confused (e.g., disoriented, slurred speech)    Negative: Fainted > 15 minutes ago and still feels too weak or dizzy to stand    Negative: SEVERE weakness (i.e., unable to walk or barely able to walk, requires support) and new onset or worsening    Negative: Sounds like a life-threatening emergency to the triager    Negative:  Weakness of the face, arm or leg on one side of the body    Negative: Has diabetes and weakness from low blood sugar (i.e., < 60 mg/dl or 3.5 mmol/l)    Negative: Recent heat exposure, suspected cause of weakness    Negative: Vomiting is the main symptom    Negative: Diarrhea is the main symptom    Negative: Difficulty breathing    Negative: Heart beating < 50 beats per minute OR > 140 beats per minute    Negative: Extra heartbeats OR irregular heart beating (i.e., 'palpitations')    Negative: Follows bleeding (e.g., from vomiting, rectum, vagina)    Negative: Bloody, black, or tarry bowel movements    Negative: MODERATE weakness from poor fluid intake with no improvement after 2 hours of rest and fluids    Negative: Drinking very little and dehydration suspected (e.g., no urine > 12 hours, very dry mouth, very lightheaded)    Negative: Patient sounds very sick or weak to the triager    Negative: MODERATE weakness (i.e., interferes with work, school, normal activities) and cause unknown    Negative: Fever > 103 F (39.4 C) and not able to get the fever down using CARE ADVICE    Negative: Fever > 100.0 F (37.8 C) and bedridden (e.g., nursing home patient, stroke, chronic illness, recovering from surgery)    Negative: Fever > 101 F (38.3 C) and over 60 years of age    Negative: Fever > 100.0 F (37.8 C) and diabetes mellitus or weak immune system (e.g., HIV positive, cancer chemo, splenectomy, organ transplant, chronic steroids)    Negative: Pale skin (pallor)    Negative: MODERATE weakness (i.e., interferes with work, school, normal activities) and persists > 3 days    Negative: Taking a medicine that could cause weakness (e.g., blood pressure medications, diuretics)    Negative: Patient wants to be seen    Negative: MILD weakness (i.e., does not interfere with ability to work, go to school, normal activities) and persists > 1 week    Negative: Fatigue (i.e., tires easily, decreased energy) and persists > 1 week     Weakness is a chronic symptom (recurrent or ongoing AND lasting > 4 weeks)    Protocols used: WEAKNESS (GENERALIZED) AND FATIGUE-A-OH

## 2023-01-03 ENCOUNTER — ULTRASOUND (OUTPATIENT)
Facility: HOSPITAL | Age: 43
End: 2023-01-03

## 2023-01-03 VITALS
BODY MASS INDEX: 27.31 KG/M2 | HEART RATE: 79 BPM | DIASTOLIC BLOOD PRESSURE: 62 MMHG | HEIGHT: 64 IN | SYSTOLIC BLOOD PRESSURE: 90 MMHG | WEIGHT: 160 LBS

## 2023-01-03 DIAGNOSIS — O09.813 PREGNANCY RESULTING FROM IN VITRO FERTILIZATION IN THIRD TRIMESTER: ICD-10-CM

## 2023-01-03 DIAGNOSIS — O09.523 AMA (ADVANCED MATERNAL AGE) MULTIGRAVIDA 35+, THIRD TRIMESTER: ICD-10-CM

## 2023-01-03 DIAGNOSIS — Z3A.34 34 WEEKS GESTATION OF PREGNANCY: Primary | ICD-10-CM

## 2023-01-03 NOTE — PROGRESS NOTES
Repeat Non-Stress Testing:    Patient verbalizes +FM  Pt denies ALL:               Leaking of fluid   Contractions   Vaginal bleeding   Decreased fetal movement    Patient is performing daily kick counts  Patient has no questions or concerns     NST strip reviewed by Dr Kalli Hernandez

## 2023-01-03 NOTE — PROGRESS NOTES
[facility] Ralph Gibbs was seen today at 34w3d for NST (found under the pregnancy episode) which I reviewed the RN assessment and agree, and MILLIE (see ultrasound report under OB procedures tab)  See ultrasound report under "OB Procedures" tab    Please don't hesitate to contact our office with any concerns or questions   -Tye Wall MD

## 2023-01-03 NOTE — LETTER
January 3, 2023     [address]  [address]  [facility] 105    Patient: Chen Garcia   YOB: 1980   Date of Visit: 1/3/2023       Dear Dr Theresa Bell: Thank you for referring Chen Garcia to me for evaluation  Below are my notes for this consultation  If you have questions, please do not hesitate to call me  I look forward to following your patient along with you  Sincerely,        José Antonio Shelton MD        CC: No Recipients  José Antonio Shelton MD  1/3/2023 10:22 AM  Sign when Signing Visit  [address]: Chen Garcia was seen today at 34w3d for NST (found under the pregnancy episode) which I reviewed the RN assessment and agree, and MILLIE (see ultrasound report under OB procedures tab)  See ultrasound report under "OB Procedures" tab    Please don't hesitate to contact our office with any concerns or questions   -José Antonio Shelton MD

## 2023-01-09 ENCOUNTER — ROUTINE PRENATAL (OUTPATIENT)
Dept: PERINATAL CARE | Facility: OTHER | Age: 43
End: 2023-01-09

## 2023-01-09 VITALS
BODY MASS INDEX: 27.86 KG/M2 | HEIGHT: 64 IN | HEART RATE: 87 BPM | WEIGHT: 163.2 LBS | DIASTOLIC BLOOD PRESSURE: 66 MMHG | SYSTOLIC BLOOD PRESSURE: 100 MMHG

## 2023-01-09 DIAGNOSIS — Z3A.35 35 WEEKS GESTATION OF PREGNANCY: ICD-10-CM

## 2023-01-09 DIAGNOSIS — O09.523 AMA (ADVANCED MATERNAL AGE) MULTIGRAVIDA 35+, THIRD TRIMESTER: ICD-10-CM

## 2023-01-09 DIAGNOSIS — O09.813 PREGNANCY RESULTING FROM IN VITRO FERTILIZATION IN THIRD TRIMESTER: Primary | ICD-10-CM

## 2023-01-09 NOTE — LETTER
NST sleeve cover sheet    Patient name: Freida Dumas   [id number]  : 1980  MRN: [id number]    MELISSA: Estimated Date of Delivery: 23    Obstetrician: ___________[facility]____    Reason(s) for testing:  ______________________AMA                                              IVF                          Testing frequency:    ___ 2x/wk  _x__ 1x/wk  ___ Dopplers  ___ BPP?       Last growth scan: __________________________________________

## 2023-01-09 NOTE — PROGRESS NOTES
The patient was seen today for an ultrasound  Please see ultrasound report (located under Ob Procedures) for additional details  Thank you very much for allowing us to participate in the care of this very nice patient  Should you have any questions, please do not hesitate to contact me  Yamil De La O MD [id number]  Attending Physician, Ze[place]

## 2023-01-09 NOTE — PROGRESS NOTES
NST procedure and expected outcome explained to patient  Daily fetal kick count discussed with handout given  Patient verbalized understanding of all and was receptive      Yazan Garcia RN

## 2023-01-09 NOTE — PATIENT INSTRUCTIONS
Nonstress Test for Pregnancy   WHAT YOU NEED TO KNOW:   What do I need to know about a nonstress test?  A nonstress test measures your baby's heart rate and movements  Nonstress means that no stress will be placed on your baby during the test   How do I prepare for a nonstress test?  Your healthcare provider will talk to you about how to prepare for this test  He or she may tell you to eat and drink plenty of fluids before your test  If you smoke, you may be asked not to smoke within 2 hours before the test  He or she will also tell you which medicines to take or not take on the day of your test   What will happen during a nonstress test?  You may be asked to lie down or recline back for the test on a bed  One or 2 belts with sensors will be placed around your abdomen  Your baby's heart rate will be recorded with a machine  If your baby does not move, your baby may be asleep  Your healthcare provider may make a noise near your abdomen to try to wake your baby  The test usually takes about 20 minutes, but can take longer if your baby needs to be awakened  What do I need to know about the test results? Your baby will be expected to move at least 2 times for a certain amount of time  Your baby's heart rate will be expected to go up by a certain number of beats per minute during movement  If your baby does not move as expected, the test may need to be repeated or you may need other tests  CARE AGREEMENT:   You have the right to help plan your care  Learn about your health condition and how it may be treated  Discuss treatment options with your healthcare providers to decide what care you want to receive  You always have the right to refuse treatment  The above information is an  only  It is not intended as medical advice for individual conditions or treatments  Talk to your doctor, nurse or pharmacist before following any medical regimen to see if it is safe and effective for you    © Copyright [employer] 2022 Information is for End User's use only and may not be sold, redistributed or otherwise used for commercial purposes  All illustrations and images included in CareNotes® are the copyrighted property of Srinivas JOHNSTON  or [address] [place] [address] Zachary Hernandez in Pregnancy   AMBULATORY CARE:   Kick counts  measure how much your baby is moving in your womb  A kick from your baby can be felt as a twist, turn, swish, roll, or jab  It is common to feel your baby kicking at 26 to 28 weeks of pregnancy  You may feel your baby kick as early as 20 weeks of pregnancy  You may want to start counting at 28 weeks  Contact your doctor immediately if:   You feel a change in the number of kicks or movements of your baby  You feel fewer than 10 kicks within 2 hours  You have questions or concerns about your baby's movements  Why measure kick counts:  Your baby's movement may provide information about your baby's health  He or she may move less, or not at all, if there are problems  Your baby may move less if he or she is not getting enough oxygen or nutrition from the placenta  Do not smoke while you are pregnant  Smoking decreases the amount of oxygen that gets to your baby  Talk to your healthcare provider if you need help to quit smoking  Tell your healthcare provider as soon as you feel a change in your baby's movements  When to measure kick counts:   Measure kick counts at the same time every day  Measure kick counts when your baby is awake and most active  Your baby may be most active in the evening  How to measure kick counts:  Check that your baby is awake before you measure kick counts  You can wake up your baby by lightly pushing on your belly, walking, or drinking something cold  Your healthcare provider may tell you different ways to measure kick counts  You may be told to do the following:  Use a chart or clock to keep track of the time you start and finish counting       Sit in a chair or lie on your left side  Place your hands on the largest part of your belly  Count until you reach 10 kicks  Write down how much time it takes to count 10 kicks  It may take 30 minutes to 2 hours to count 10 kicks  It should not take more than 2 hours to count 10 kicks  Follow up with your doctor as directed:  Write down your questions so you remember to ask them during your visits  © Copyright [employer] 2022 Information is for End User's use only and may not be sold, redistributed or otherwise used for commercial purposes  All illustrations and images included in CareNotes® are the copyrighted property of A D A M , Inc  or [facility] Becky Burks   The above information is an  only  It is not intended as medical advice for individual conditions or treatments  Talk to your doctor, nurse or pharmacist before following any medical regimen to see if it is safe and effective for you

## 2023-01-10 ENCOUNTER — ROUTINE PRENATAL (OUTPATIENT)
Dept: OBGYN CLINIC | Facility: CLINIC | Age: 43
End: 2023-01-10

## 2023-01-10 VITALS — SYSTOLIC BLOOD PRESSURE: 112 MMHG | DIASTOLIC BLOOD PRESSURE: 62 MMHG | BODY MASS INDEX: 27.64 KG/M2 | WEIGHT: 161 LBS

## 2023-01-10 DIAGNOSIS — O09.813 PREGNANCY RESULTING FROM IN VITRO FERTILIZATION IN THIRD TRIMESTER: Primary | ICD-10-CM

## 2023-01-10 DIAGNOSIS — O09.523 AMA (ADVANCED MATERNAL AGE) MULTIGRAVIDA 35+, THIRD TRIMESTER: ICD-10-CM

## 2023-01-10 DIAGNOSIS — Z3A.35 35 WEEKS GESTATION OF PREGNANCY: ICD-10-CM

## 2023-01-10 NOTE — PROGRESS NOTES
Patient denies any fluid leaking, contractions or pelvic pressure  She is doing well, she has no concerns at this time

## 2023-01-10 NOTE — PROGRESS NOTES
40 y o    female at 34 2 wga for PNV  BP : 112/62  TW  IVF pregnancy  Doing well    No complaints  NST's weekly starting next week  GBS next visit  Discussed 39 wk IOL vs expectant management - pt prefers to await spontaneous labor - understands IOL if she reaches 41 weeks  F/u 1 week

## 2023-01-15 NOTE — PROGRESS NOTES
Please refer to the [facility] ultrasound report in Ob Procedures for additional information regarding today's visit

## 2023-01-16 ENCOUNTER — ULTRASOUND (OUTPATIENT)
Facility: HOSPITAL | Age: 43
End: 2023-01-16

## 2023-01-16 VITALS
DIASTOLIC BLOOD PRESSURE: 62 MMHG | WEIGHT: 161.8 LBS | BODY MASS INDEX: 27.62 KG/M2 | HEIGHT: 64 IN | HEART RATE: 65 BPM | SYSTOLIC BLOOD PRESSURE: 108 MMHG

## 2023-01-16 DIAGNOSIS — O09.813 PREGNANCY RESULTING FROM IN VITRO FERTILIZATION IN THIRD TRIMESTER: Primary | ICD-10-CM

## 2023-01-16 DIAGNOSIS — O09.523 AMA (ADVANCED MATERNAL AGE) MULTIGRAVIDA 35+, THIRD TRIMESTER: ICD-10-CM

## 2023-01-16 DIAGNOSIS — Z3A.36 36 WEEKS GESTATION OF PREGNANCY: ICD-10-CM

## 2023-01-16 NOTE — LETTER
January 16, 2023     [address] [facility] MD [address]  [address]  [facility]    Patient: Alma Tellez   YOB: 1980   Date of Visit: 1/16/2023       Dear Dr Garcia Mouse: Thank you for referring Alma Tellez to me for evaluation  Below are my notes for this consultation  If you have questions, please do not hesitate to call me  I look forward to following your patient along with you           Sincerely,        Montana Mcdermott MD        CC: No Recipients  Montana Mcdermott MD  1/15/2023  7:50 AM  Sign when Signing Visit  Please refer to the [facility] ultrasound report in Ob Procedures for additional information regarding today's visit

## 2023-01-16 NOTE — PROGRESS NOTES
Repeat Non-Stress Testing:    Patient verbalizes +FM  Pt denies ALL:               Leaking of fluid   Contractions   Vaginal bleeding   Decreased fetal movement    Patient is performing daily kick counts  Patient has no questions or concerns     NST strip reviewed by Dr Juan Diego Shultz

## 2023-01-17 ENCOUNTER — ROUTINE PRENATAL (OUTPATIENT)
Dept: OBGYN CLINIC | Facility: CLINIC | Age: 43
End: 2023-01-17

## 2023-01-17 VITALS — SYSTOLIC BLOOD PRESSURE: 108 MMHG | WEIGHT: 160 LBS | BODY MASS INDEX: 27.46 KG/M2 | DIASTOLIC BLOOD PRESSURE: 64 MMHG

## 2023-01-17 DIAGNOSIS — Z3A.36 36 WEEKS GESTATION OF PREGNANCY: ICD-10-CM

## 2023-01-17 DIAGNOSIS — O09.813 PREGNANCY RESULTING FROM IN VITRO FERTILIZATION IN THIRD TRIMESTER: Primary | ICD-10-CM

## 2023-01-17 DIAGNOSIS — O09.523 AMA (ADVANCED MATERNAL AGE) MULTIGRAVIDA 35+, THIRD TRIMESTER: ICD-10-CM

## 2023-01-17 NOTE — PROGRESS NOTES
This is a 40 y o   at 36w3d who presents for return OB visit  No complaints  Denies contractions, leakage, bleeding  Endorses fetal movement  BP: 108/64 TWlb    GBS, GC/CT done: Yes  PCN allergy: No  AMA, IVF pregnancy: NST/MILLIE weekly done at [facility]  Labor precautions given  [address] reviewed     F/up 1 wk

## 2023-01-18 LAB
C TRACH DNA SPEC QL NAA+PROBE: NEGATIVE
N GONORRHOEA DNA SPEC QL NAA+PROBE: NEGATIVE

## 2023-01-19 LAB — GP B STREP DNA SPEC QL NAA+PROBE: NEGATIVE

## 2023-01-23 ENCOUNTER — ULTRASOUND (OUTPATIENT)
Facility: HOSPITAL | Age: 43
End: 2023-01-23

## 2023-01-23 VITALS
DIASTOLIC BLOOD PRESSURE: 60 MMHG | HEART RATE: 66 BPM | WEIGHT: 163.2 LBS | BODY MASS INDEX: 27.86 KG/M2 | SYSTOLIC BLOOD PRESSURE: 98 MMHG | HEIGHT: 64 IN

## 2023-01-23 DIAGNOSIS — Z3A.37 37 WEEKS GESTATION OF PREGNANCY: Primary | ICD-10-CM

## 2023-01-23 DIAGNOSIS — O09.523 AMA (ADVANCED MATERNAL AGE) MULTIGRAVIDA 35+, THIRD TRIMESTER: ICD-10-CM

## 2023-01-23 NOTE — PROGRESS NOTES
Repeat Non-Stress Testing:    Patient verbalizes +FM  Pt denies ALL:               Leaking of fluid   Contractions   Vaginal bleeding   Decreased fetal movement    Patient is performing daily kick counts  Patient has no questions or concerns     NST strip reviewed by Dr Edd Merlin

## 2023-01-24 ENCOUNTER — ROUTINE PRENATAL (OUTPATIENT)
Dept: OBGYN CLINIC | Facility: CLINIC | Age: 43
End: 2023-01-24

## 2023-01-24 VITALS — BODY MASS INDEX: 28.15 KG/M2 | SYSTOLIC BLOOD PRESSURE: 102 MMHG | DIASTOLIC BLOOD PRESSURE: 68 MMHG | WEIGHT: 164 LBS

## 2023-01-24 DIAGNOSIS — O09.523 AMA (ADVANCED MATERNAL AGE) MULTIGRAVIDA 35+, THIRD TRIMESTER: Primary | ICD-10-CM

## 2023-01-24 DIAGNOSIS — Z3A.37 37 WEEKS GESTATION OF PREGNANCY: ICD-10-CM

## 2023-01-24 DIAGNOSIS — O09.813 PREGNANCY RESULTING FROM IN VITRO FERTILIZATION IN THIRD TRIMESTER: ICD-10-CM

## 2023-01-24 NOTE — PROGRESS NOTES
40 y o    female at 42 1 wga for PNV  BP : 102/68   TW  SVE cl/th/hi  Reviewed labor precautions and FKCs  Discussed IOL if she makes it to her due date  NST/MILLIE weekly at [facility]  Reviewed residents are part of care team  F/u 1 week

## 2023-01-24 NOTE — PROGRESS NOTES
Undressed for a cervical check  Patient states she did have a little pressure yesterday but denies any fluid leaking or contractions  Patient would like to discuss doing the weekly NST/MILLIE's here instead of at [facility]  Urine dip neg/neg

## 2023-01-30 ENCOUNTER — ULTRASOUND (OUTPATIENT)
Facility: HOSPITAL | Age: 43
End: 2023-01-30

## 2023-01-30 VITALS
DIASTOLIC BLOOD PRESSURE: 70 MMHG | WEIGHT: 163.8 LBS | HEART RATE: 66 BPM | BODY MASS INDEX: 27.96 KG/M2 | SYSTOLIC BLOOD PRESSURE: 112 MMHG | HEIGHT: 64 IN

## 2023-01-30 DIAGNOSIS — O09.523 AMA (ADVANCED MATERNAL AGE) MULTIGRAVIDA 35+, THIRD TRIMESTER: ICD-10-CM

## 2023-01-30 DIAGNOSIS — O09.813 PREGNANCY RESULTING FROM IN VITRO FERTILIZATION IN THIRD TRIMESTER: ICD-10-CM

## 2023-01-30 DIAGNOSIS — Z3A.38 38 WEEKS GESTATION OF PREGNANCY: Primary | ICD-10-CM

## 2023-01-30 NOTE — LETTER
January 30, 2023     Miguel De La Rosa MD  [address] [facility]  [address]  [facility]    Patient: Janell Albert   YOB: 1980   Date of Visit: 1/30/2023       Dear Dr Franc Christopher:    Thank you for referring Janell Albert to me for evaluation  Below are my notes for this consultation  If you have questions, please do not hesitate to call me  I look forward to following your patient along with you  Sincerely,        Tia Pinto MD        CC: No Recipients  Tia Pinto MD  1/30/2023 10:27 AM  Sign when Signing Visit  [address]: Ms Inez Harris was seen today for NST (found under the pregnancy episode) which I reviewed the RN assessment and agree, and MILLIE (see ultrasound report under OB procedures tab)  Please don't hesitate to contact our office with any concerns or questions    -Tia Pinto MD

## 2023-01-30 NOTE — PROGRESS NOTES
[address] [facility] [address]: Ms Alex Franklin was seen today for NST (found under the pregnancy episode) which I reviewed the RN assessment and agree, and MILLIE (see ultrasound report under OB procedures tab)  Please don't hesitate to contact our office with any concerns or questions    -Petty Obregon MD

## 2023-01-30 NOTE — PROGRESS NOTES
Repeat Non-Stress Testing:    Patient verbalizes +FM  Pt denies ALL:               Leaking of fluid   Contractions   Vaginal bleeding   Decreased fetal movement    Patient is performing daily kick counts  Patient has no questions or concerns     NST strip reviewed by Dr Bc Dejesus

## 2023-01-31 ENCOUNTER — NURSE TRIAGE (OUTPATIENT)
Dept: OTHER | Facility: OTHER | Age: 43
End: 2023-01-31

## 2023-01-31 ENCOUNTER — ROUTINE PRENATAL (OUTPATIENT)
Dept: OBGYN CLINIC | Facility: CLINIC | Age: 43
End: 2023-01-31

## 2023-01-31 VITALS — SYSTOLIC BLOOD PRESSURE: 110 MMHG | WEIGHT: 163 LBS | DIASTOLIC BLOOD PRESSURE: 68 MMHG | BODY MASS INDEX: 27.98 KG/M2

## 2023-01-31 DIAGNOSIS — O09.523 AMA (ADVANCED MATERNAL AGE) MULTIGRAVIDA 35+, THIRD TRIMESTER: ICD-10-CM

## 2023-01-31 DIAGNOSIS — O09.813 PREGNANCY RESULTING FROM IN VITRO FERTILIZATION IN THIRD TRIMESTER: ICD-10-CM

## 2023-01-31 DIAGNOSIS — Z3A.38 38 WEEKS GESTATION OF PREGNANCY: Primary | ICD-10-CM

## 2023-01-31 NOTE — PROGRESS NOTES
40 y o   female at 38w3d (Estimated Date of Delivery: 23) for PNV  Pre-Brittany Vitals    Flowsheet Row Most Recent Value   Prenatal Assessment    Fetal Heart Rate 130   Movement Present   Prenatal Vitals    Blood Pressure 110/68   Weight - Scale 73 9 kg (163 lb)   Urine Albumin/Glucose    Dilation/Effacement/Station    Vaginal Drainage    Edema         TW 7 kg (28 lb)    Leakage of fluid: no  Vaginal bleeding: no  Contractions/Cramping: no  Fetal movement: yes    IVF/AMA - NST/MILLIE weekly  NST and MILLIE wnl and performed yesterday at [facility]  Discussed cervical exam recommended at next visit  RTO in 1 weeks

## 2023-01-31 NOTE — PROGRESS NOTES
Pt is here for her 38w prenatal  Pt denies any swelling at this time  Pt said during her nst yesterday she had 3 contractions within 11min  Pt denies any pressure, contractions, or leakage   Pt would like to discuss if she should have a cervical exam

## 2023-02-01 NOTE — TELEPHONE ENCOUNTER
Regardin weeks pregnant/fell down steps  ----- Message from Toy Pascual sent at 2023  8:02 PM EST -----  Pt called " I am 38 weeks pregnant and just fell down the steps "

## 2023-02-01 NOTE — TELEPHONE ENCOUNTER
Reason for Disposition  • [1] Pregnant 20 or more weeks AND [2] fall to ground or floor (e g , walking and tripped)    Answer Assessment - Initial Assessment Questions  1  MECHANISM: "How did the fall happen?"      Fell down 7 steps    2  ONSET: "When did the fall happen?" (e g , minutes, hours, or days ago)     15 minutes ago     3  LOCATION: "What part of the body hit the ground?" (e g , back, buttocks, head, hips, knees, hands, head, stomach)    Landed on Lower back/right wrist     4  INJURY: "Did you get hurt when you fell? Are there any obvious injuries?" If Yes, ask: "What does the injury look like?"      Denies    5  PAIN: "Is there any pain?" If Yes, ask: "How bad is the pain?" (e g , Scale 1-10; or mild,   moderate, severe)    - NONE (0): no pain    - MILD (1-3): doesn't interfere with normal activities     - MODERATE (4-7): interferes with normal activities or awakens from sleep     - SEVERE (8-10): excruciating pain, unable to do any normal activities      1/10    6  SIZE: For cuts, bruises, or swelling, ask: "How large is it?" (e g , inches or centimeters)     N/A    7  MELISSA: "What date are you expecting to deliver?"     2/11/23    8  FETAL MOVEMENT: "Has the baby's movement decreased or changed significantly from   normal?"     2 slight movements since the injury happened     9   OTHER SYMPTOMS: "Do you have any other symptoms?" (e g , stomach pain, contractions, vaginal bleeding, leaking of fluid from vagina)       Denies any bleeding, leakage of fluid, abdominal pain or contractions        Fetal HR 150s    Protocols used: PREGNANCY - FALL-ADULT-

## 2023-02-06 ENCOUNTER — ULTRASOUND (OUTPATIENT)
Facility: HOSPITAL | Age: 43
End: 2023-02-06

## 2023-02-06 VITALS
HEIGHT: 64 IN | SYSTOLIC BLOOD PRESSURE: 108 MMHG | WEIGHT: 166.8 LBS | DIASTOLIC BLOOD PRESSURE: 72 MMHG | BODY MASS INDEX: 28.48 KG/M2 | HEART RATE: 98 BPM

## 2023-02-06 DIAGNOSIS — O09.813 PREGNANCY RESULTING FROM IN VITRO FERTILIZATION IN THIRD TRIMESTER: ICD-10-CM

## 2023-02-06 DIAGNOSIS — O09.523 AMA (ADVANCED MATERNAL AGE) MULTIGRAVIDA 35+, THIRD TRIMESTER: Primary | ICD-10-CM

## 2023-02-06 NOTE — PROGRESS NOTES
[address]: Ms Montserrat Travis was seen today for NST (found under the pregnancy episode) which I reviewed the RN assessment and agree, and fetal growth ultrasound (see ultrasound report under OB procedures tab)  The time spent on this established patient on the encounter date included 5 minutes previsit service time reviewing records and precharting, 5 minutes face-to-face service time counseling regarding results and coordinating care, and  10 minutes charting, totalling 20 minutes  Please don't hesitate to contact our office with any concerns or questions    Daniel Espinoza MD

## 2023-02-06 NOTE — PROGRESS NOTES
Repeat Non-Stress Testing:    Patient verbalizes +FM  Pt denies ALL:               Leaking of fluid   Contractions   Vaginal bleeding   Decreased fetal movement    Patient is performing daily kick counts  Patient has no questions or concerns  NST strip reviewed by Dr Mayford Barthel

## 2023-02-06 NOTE — LETTER
Date: 2023    [address]  [address]  [facility] 105    Patient: Genevieve Ardon   YOB: 1980   Date of Visit: 2023   Gestational age Ginna Keenan of this communication: Routine though please note you are seeing her tomorrow       Dear Brandon Hatch,    This patient was seen recently in our  office  Please see ultrasound report under "OB Procedures" tab  Please don't hesitate to contact our office with any concerns or questions        Sincerely,      Gonzalo Calderon MD  Attending Physician, Ze[place]

## 2023-02-07 ENCOUNTER — ROUTINE PRENATAL (OUTPATIENT)
Dept: OBGYN CLINIC | Facility: CLINIC | Age: 43
End: 2023-02-07

## 2023-02-07 VITALS — SYSTOLIC BLOOD PRESSURE: 112 MMHG | BODY MASS INDEX: 28.56 KG/M2 | DIASTOLIC BLOOD PRESSURE: 68 MMHG | WEIGHT: 166.4 LBS

## 2023-02-07 DIAGNOSIS — O09.523 AMA (ADVANCED MATERNAL AGE) MULTIGRAVIDA 35+, THIRD TRIMESTER: Primary | ICD-10-CM

## 2023-02-07 DIAGNOSIS — Z3A.39 39 WEEKS GESTATION OF PREGNANCY: ICD-10-CM

## 2023-02-07 NOTE — PROGRESS NOTES
Pt is here for a routine prenatal, 39 weeks  Pt will complete cervical check  Pt did NST at maternal fetal medicine on 2/6/2023  Denies vaginal bleeding and leakage of fluid

## 2023-02-08 PROBLEM — Z3A.39 39 WEEKS GESTATION OF PREGNANCY: Status: ACTIVE | Noted: 2022-12-27

## 2023-02-09 NOTE — PROGRESS NOTES
40 y o    female at 38 4 wga for PNV  BP : 112/68  TW  Feeling well    No complaints  Recent growth - EFW 8 9lbs  Discussed IOL by [facility] - IVF, AMA, poss macrosomia - reviewed IOL process   IOL scheduled Fri 3pm  Reviewed labor precautions and FKCs

## 2023-02-10 ENCOUNTER — ANESTHESIA (OUTPATIENT)
Dept: ANESTHESIOLOGY | Facility: HOSPITAL | Age: 43
End: 2023-02-10

## 2023-02-10 ENCOUNTER — HOSPITAL ENCOUNTER (INPATIENT)
Facility: HOSPITAL | Age: 43
LOS: 3 days | Discharge: HOME | End: 2023-02-13
Attending: OBSTETRICS & GYNECOLOGY | Admitting: OBSTETRICS & GYNECOLOGY

## 2023-02-10 ENCOUNTER — ANESTHESIA EVENT (OUTPATIENT)
Dept: ANESTHESIOLOGY | Facility: HOSPITAL | Age: 43
End: 2023-02-10

## 2023-02-10 ENCOUNTER — HOSPITAL ENCOUNTER (OUTPATIENT)
Dept: LABOR AND DELIVERY | Facility: HOSPITAL | Age: 43
Discharge: HOME | End: 2023-02-10

## 2023-02-10 DIAGNOSIS — Z37.9 VACUUM-ASSISTED VAGINAL DELIVERY: ICD-10-CM

## 2023-02-10 DIAGNOSIS — Z3A.39 39 WEEKS GESTATION OF PREGNANCY: Primary | ICD-10-CM

## 2023-02-10 LAB
ABO GROUP BLD: NORMAL
BLD GP AB SCN SERPL QL: NEGATIVE
ERYTHROCYTE [DISTWIDTH] IN BLOOD BY AUTOMATED COUNT: 13.1 % (ref 11.6–15.1)
HCT VFR BLD AUTO: 36.1 % (ref 34.8–46.1)
HGB BLD-MCNC: 12.1 G/DL (ref 11.5–15.4)
HOLD SPECIMEN: NORMAL
MCH RBC QN AUTO: 31.8 PG (ref 26.8–34.3)
MCHC RBC AUTO-ENTMCNC: 33.5 G/DL (ref 31.4–37.4)
MCV RBC AUTO: 95 FL (ref 82–98)
PLATELET # BLD AUTO: 234 THOUSANDS/UL (ref 149–390)
PMV BLD AUTO: 10.9 FL (ref 8.9–12.7)
RBC # BLD AUTO: 3.8 MILLION/UL (ref 3.81–5.12)
RH BLD: POSITIVE
SPECIMEN EXPIRATION DATE: NORMAL
WBC # BLD AUTO: 9.63 THOUSAND/UL (ref 4.31–10.16)

## 2023-02-10 PROCEDURE — 4A1HXCZ MONITORING OF PRODUCTS OF CONCEPTION, CARDIAC RATE, EXTERNAL APPROACH: ICD-10-PCS | Performed by: OBSTETRICS & GYNECOLOGY

## 2023-02-10 PROCEDURE — 10907ZC DRAINAGE OF AMNIOTIC FLUID, THERAPEUTIC FROM PRODUCTS OF CONCEPTION, VIA NATURAL OR ARTIFICIAL OPENING: ICD-10-PCS | Performed by: OBSTETRICS & GYNECOLOGY

## 2023-02-10 PROCEDURE — 3E033VJ INTRODUCTION OF OTHER HORMONE INTO PERIPHERAL VEIN, PERCUTANEOUS APPROACH: ICD-10-PCS | Performed by: OBSTETRICS & GYNECOLOGY

## 2023-02-10 RX ORDER — BUPIVACAINE HYDROCHLORIDE 2.5 MG/ML
30 INJECTION, SOLUTION EPIDURAL; INFILTRATION; INTRACAUDAL ONCE AS NEEDED
Status: DISCONTINUED | OUTPATIENT
Start: 2023-02-10 | End: 2023-02-11

## 2023-02-10 RX ORDER — SODIUM CHLORIDE, SODIUM LACTATE, POTASSIUM CHLORIDE, CALCIUM CHLORIDE 600; 310; 30; 20 MG/100ML; MG/100ML; MG/100ML; MG/100ML
125 INJECTION, SOLUTION INTRAVENOUS CONTINUOUS
Status: DISCONTINUED | OUTPATIENT
Start: 2023-02-10 | End: 2023-02-11

## 2023-02-10 RX ORDER — OXYTOCIN/RINGER'S LACTATE 30/500 ML
1-30 PLASTIC BAG, INJECTION (ML) INTRAVENOUS
Status: DISCONTINUED | OUTPATIENT
Start: 2023-02-10 | End: 2023-02-11

## 2023-02-10 RX ADMIN — SODIUM CHLORIDE, SODIUM LACTATE, POTASSIUM CHLORIDE, AND CALCIUM CHLORIDE 125 ML/HR: .6; .31; .03; .02 INJECTION, SOLUTION INTRAVENOUS at 20:16

## 2023-02-10 RX ADMIN — Medication 2 MILLI-UNITS/MIN: at 20:19

## 2023-02-10 NOTE — QUICK NOTE
Keita Balloon placement    Pt in dorsal lithotomy  Speculum placed and cervix visualized  Keita catheter advanced with ring forceps  Balloon inflated with 60cc NS  Pt and fetus tolerated procedure will      Plan for cytotec after NST

## 2023-02-10 NOTE — PLAN OF CARE
Problem: PAIN - ADULT  Goal: Verbalizes/displays adequate comfort level or baseline comfort level  Description: Interventions:  - Encourage patient to monitor pain and request assistance  - Assess pain using appropriate pain scale  - Administer analgesics based on type and severity of pain and evaluate response  - Implement non-pharmacological measures as appropriate and evaluate response  - Consider cultural and social influences on pain and pain management  - Notify physician/advanced practitioner if interventions unsuccessful or patient reports new pain  Outcome: Progressing     Problem: INFECTION - ADULT  Goal: Absence or prevention of progression during hospitalization  Description: INTERVENTIONS:  - Assess and monitor for signs and symptoms of infection  - Monitor lab/diagnostic results  - Monitor all insertion sites, i e  indwelling lines, tubes, and drains  - Monitor endotracheal if appropriate and nasal secretions for changes in amount and color  - Bend appropriate cooling/warming therapies per order  - Administer medications as ordered  - Instruct and encourage patient and family to use good hand hygiene technique  - Identify and instruct in appropriate isolation precautions for identified infection/condition  Outcome: Progressing  Goal: Absence of fever/infection during neutropenic period  Description: INTERVENTIONS:  - Monitor WBC    Outcome: Progressing     Problem: SAFETY ADULT  Goal: Patient will remain free of falls  Description: INTERVENTIONS:  - Educate patient/family on patient safety including physical limitations  - Instruct patient to call for assistance with activity   - Consult OT/PT to assist with strengthening/mobility   - Keep Call bell within reach  - Keep bed low and locked with side rails adjusted as appropriate  - Keep care items and personal belongings within reach  - Initiate and maintain comfort rounds  Outcome: Progressing     Problem: Knowledge Deficit  Goal: Patient/family/caregiver demonstrates understanding of disease process, treatment plan, medications, and discharge instructions  Description: Complete learning assessment and assess knowledge base    Interventions:  - Provide teaching at level of understanding  - Provide teaching via preferred learning methods  Outcome: Progressing     Problem: DISCHARGE PLANNING  Goal: Discharge to home or other facility with appropriate resources  Description: INTERVENTIONS:  - Identify barriers to discharge w/patient and caregiver  - Arrange for needed discharge resources and transportation as appropriate  - Identify discharge learning needs (meds, wound care, etc )  - Arrange for interpretive services to assist at discharge as needed  - Refer to Case Management Department for coordinating discharge planning if the patient needs post-hospital services based on physician/advanced practitioner order or complex needs related to functional status, cognitive ability, or social support system  Outcome: Progressing

## 2023-02-10 NOTE — ASSESSMENT & PLAN NOTE
Pain well controlled  Griffith catheter replaced overnight due to urinary retention  Appropriate bowel function  Breastfeeding  Lochia wnl  · Plan to remove griffith around noon pending adequate urine output  · Continue routine postpartum care

## 2023-02-10 NOTE — H&P
H&P Exam - Obstetrics   Robinson Arteaga 40 y o  female MRN: [id number]  Unit/Bed#: LD TRIAGE  Encounter: [id number]      ASSESSMENT:  42yo  at 39w6d weeks gestation who is being admitted for eIOL  EFW: 8lbs 9oz (3886 g, 80%) by ultrasound at 39w2d   VTX by transabdominal ultrasound     PLAN:  * 39 weeks gestation of pregnancy  Assessment & Plan  Admit  Follow up CBC, RPR, Blood Type  Start with griffith balloon  Method of contraception: declines contraception   GBS neagtive status  Analgesia and/or epidural at patient request  Anticipate     Discussed with Dr Richa Lee       This patient will be an INPATIENT  and I certify the anticipated length of stay is >2 Midnights  History of Present Illness     Chief Complaint: Induction of labor    HPI:  Robinson Arteaga is a 40 y o   female with an MELISSA of 2023, by Embryo Transfer at 39w6d weeks gestation who is being admitted for eIOL  Patient states she is feeling well today and has no concerns or questions at this time  Contractions: Denies   Loss of fluid: Denies   Vaginal bleeding: Denies   Fetal movement: Endorses good fetal movement     She is [facility] patient  PREGNANCY COMPLICATIONS:   1) Pregnancy resulting form invitro fertilization   2) AMA     OB History    Para Term  AB Living   3   0   2     SAB IAB Ectopic Multiple Live Births   2              # Outcome Date GA Lbr Jacky/2nd Weight Sex Delivery Anes PTL Lv   3 Current            2 SAB 2021           1 SAB               Obstetric Comments   Menarche 16       Baby complications/comments: none     Review of Systems   Constitutional: Negative for chills and fever  Eyes: Negative for visual disturbance  Respiratory: Negative for cough and shortness of breath  Cardiovascular: Negative for chest pain and palpitations  Gastrointestinal: Negative for abdominal pain, diarrhea and nausea     Genitourinary: Negative for difficulty urinating, dysuria, pelvic pain, vaginal bleeding and vaginal pain  Neurological: Negative for dizziness, light-headedness and headaches  Historical Information   Past Medical History:   Diagnosis Date   • Anxiety    • Varicella     as child     Past Surgical History:   Procedure Laterality Date   • TONSILLECTOMY     • WISDOM TOOTH EXTRACTION       Social History   Social History     Substance and Sexual Activity   Alcohol Use Not Currently   • Alcohol/week: 2 0 standard drinks   • Types: 2 Glasses of wine per week    Comment: social     Social History     Substance and Sexual Activity   Drug Use Never     Social History     Tobacco Use   Smoking Status Former   • Types: Cigarettes   • Quit date:    • Years since quittin 1   Smokeless Tobacco Never     Family History: non-contributory    Meds/Allergies      Medications Prior to Admission   Medication   • Fe Bisgly-Vit C-Vit B12-FA (GENTLE IRON PO)   • Prenatal Vit-Fe Fumarate-FA (PRENATAL VITAMIN PO)      No Known Allergies    OBJECTIVE:    Vitals: Last menstrual period 2022, unknown if currently breastfeeding  There is no height or weight on file to calculate BMI  Physical Exam  Constitutional:       General: She is not in acute distress  Appearance: Normal appearance  HENT:      Head: Normocephalic and atraumatic  Cardiovascular:      Rate and Rhythm: Normal rate  Pulmonary:      Effort: Pulmonary effort is normal  No respiratory distress  Abdominal:      Tenderness: There is no abdominal tenderness  Comments: Gravid    Musculoskeletal:         General: Normal range of motion  Cervical back: Normal range of motion  Right lower leg: No edema  Left lower leg: No edema  Skin:     General: Skin is warm  Neurological:      General: No focal deficit present  Mental Status: She is alert and oriented to person, place, and time           Cervix: Done by Dr Jeaneen Klinefelter    /     Fetal heart rate:    Baseline: 120's  Moderate variability Accelerations: present   Decelerations: absent     [place]: irregular, irritability        GBS: Negative     Prenatal Labs:   Blood Type:   Lab Results   Component Value Date/Time    ABO Grouping O 07/12/2022 11:22 AM     , D (Rh type):   Lab Results   Component Value Date/Time    Rh Factor Positive 07/12/2022 11:22 AM   , HCT/HGB:   Lab Results   Component Value Date/Time    Hematocrit 36 1 02/10/2023 04:16 PM    Hemoglobin 12 1 02/10/2023 04:16 PM      , MCV:   Lab Results   Component Value Date/Time    MCV 95 02/10/2023 04:16 PM      , Platelets:   Lab Results   Component Value Date/Time    Platelets 988 58/44/5748 04:16 PM      , 1 hour Glucola:   Lab Results   Component Value Date/Time    Glucose 143 (H) 11/08/2022 10:11 AM   , 3 hour GTT:   Lab Results   Component Value Date/Time    Glucose, GTT - 3 Hour 80 11/10/2022 10:33 AM   , Varicella:   Lab Results   Component Value Date/Time    Varicella IgG IMMUNE 07/12/2022 11:22 AM    Varicella IgG IMMUNE 07/29/2020 08:23 AM       , Rubella:   Lab Results   Component Value Date/Time    Rubella IgG Quant 37 7 07/12/2022 11:22 AM        , VDRL/RPR:   Lab Results   Component Value Date/Time    RPR Non-Reactive 11/08/2022 10:11 AM      , Urine Culture/Screen:   Lab Results   Component Value Date/Time    Urine Culture No Growth <1000 cfu/mL 07/12/2022 11:22 AM    Hep B:   Lab Results   Component Value Date/Time    Hepatitis B Surface Ag Non-reactive 07/12/2022 11:22 AM     , HIV:   Lab Results   Component Value Date/Time    HIV-1/HIV-2 Ab Non-Reactive 07/12/2022 11:22 AM         Invasive Devices     None                     Evangelina Christie MD  Family Medicine PGY- 1  2/10/2023  4:58 PM

## 2023-02-11 ENCOUNTER — ANESTHESIA (INPATIENT)
Dept: ANESTHESIOLOGY | Facility: HOSPITAL | Age: 43
End: 2023-02-11

## 2023-02-11 ENCOUNTER — ANESTHESIA EVENT (INPATIENT)
Dept: ANESTHESIOLOGY | Facility: HOSPITAL | Age: 43
End: 2023-02-11

## 2023-02-11 LAB
BASE EXCESS BLDCOV CALC-SCNC: -6 MMOL/L (ref 1–9)
HCO3 BLDCOV-SCNC: 17.5 MMOL/L (ref 12.2–28.6)
OXYHGB MFR BLDCOV: 71.6 %
PCO2 BLDCOV: 29.9 MM HG (ref 27–43)
PH BLDCOV: 7.38 [PH] (ref 7.19–7.49)
PO2 BLDCOV: 29.9 MM HG (ref 15–45)
RPR SER QL: NORMAL
SAO2 % BLDCOV: 16.2 ML/DL

## 2023-02-11 PROCEDURE — 0DQR0ZZ REPAIR ANAL SPHINCTER, OPEN APPROACH: ICD-10-PCS | Performed by: OBSTETRICS & GYNECOLOGY

## 2023-02-11 RX ORDER — METHYLERGONOVINE MALEATE 0.2 MG/ML
INJECTION INTRAVENOUS
Status: DISPENSED
Start: 2023-02-11 | End: 2023-02-12

## 2023-02-11 RX ORDER — LIDOCAINE HYDROCHLORIDE AND EPINEPHRINE 15; 5 MG/ML; UG/ML
INJECTION, SOLUTION EPIDURAL AS NEEDED
Status: DISCONTINUED | OUTPATIENT
Start: 2023-02-11 | End: 2023-02-14 | Stop reason: HOSPADM

## 2023-02-11 RX ORDER — CARBOPROST TROMETHAMINE 250 UG/ML
INJECTION, SOLUTION INTRAMUSCULAR
Status: DISPENSED
Start: 2023-02-11 | End: 2023-02-12

## 2023-02-11 RX ORDER — CEFOXITIN SODIUM 1 G/50ML
1000 INJECTION, SOLUTION INTRAVENOUS ONCE
Status: COMPLETED | OUTPATIENT
Start: 2023-02-11 | End: 2023-02-11

## 2023-02-11 RX ORDER — DOCUSATE SODIUM 100 MG/1
100 CAPSULE, LIQUID FILLED ORAL 2 TIMES DAILY
Status: DISCONTINUED | OUTPATIENT
Start: 2023-02-11 | End: 2023-02-13 | Stop reason: HOSPADM

## 2023-02-11 RX ORDER — ACETAMINOPHEN 325 MG/1
650 TABLET ORAL EVERY 4 HOURS PRN
Status: DISCONTINUED | OUTPATIENT
Start: 2023-02-11 | End: 2023-02-12

## 2023-02-11 RX ORDER — DIAPER,BRIEF,INFANT-TODD,DISP
1 EACH MISCELLANEOUS DAILY PRN
Status: DISCONTINUED | OUTPATIENT
Start: 2023-02-11 | End: 2023-02-13 | Stop reason: HOSPADM

## 2023-02-11 RX ORDER — ONDANSETRON 2 MG/ML
4 INJECTION INTRAMUSCULAR; INTRAVENOUS EVERY 8 HOURS PRN
Status: DISCONTINUED | OUTPATIENT
Start: 2023-02-11 | End: 2023-02-13 | Stop reason: HOSPADM

## 2023-02-11 RX ORDER — CEFOXITIN SODIUM 1 G/50ML
INJECTION, SOLUTION INTRAVENOUS
Status: COMPLETED
Start: 2023-02-11 | End: 2023-02-11

## 2023-02-11 RX ORDER — CALCIUM CARBONATE 200(500)MG
1000 TABLET,CHEWABLE ORAL DAILY PRN
Status: DISCONTINUED | OUTPATIENT
Start: 2023-02-11 | End: 2023-02-13 | Stop reason: HOSPADM

## 2023-02-11 RX ORDER — DIPHENHYDRAMINE HCL 25 MG
25 TABLET ORAL EVERY 6 HOURS PRN
Status: DISCONTINUED | OUTPATIENT
Start: 2023-02-11 | End: 2023-02-13 | Stop reason: HOSPADM

## 2023-02-11 RX ORDER — IBUPROFEN 600 MG/1
600 TABLET ORAL EVERY 6 HOURS
Status: DISCONTINUED | OUTPATIENT
Start: 2023-02-11 | End: 2023-02-13 | Stop reason: HOSPADM

## 2023-02-11 RX ORDER — SIMETHICONE 80 MG
80 TABLET,CHEWABLE ORAL 4 TIMES DAILY PRN
Status: DISCONTINUED | OUTPATIENT
Start: 2023-02-11 | End: 2023-02-13 | Stop reason: HOSPADM

## 2023-02-11 RX ORDER — OXYTOCIN/RINGER'S LACTATE 30/500 ML
250 PLASTIC BAG, INJECTION (ML) INTRAVENOUS CONTINUOUS
Status: ACTIVE | OUTPATIENT
Start: 2023-02-11 | End: 2023-02-11

## 2023-02-11 RX ADMIN — LIDOCAINE HYDROCHLORIDE AND EPINEPHRINE 3 ML: 15; 5 INJECTION, SOLUTION EPIDURAL at 06:56

## 2023-02-11 RX ADMIN — CEFOXITIN SODIUM 1000 MG: 1 INJECTION, SOLUTION INTRAVENOUS at 18:44

## 2023-02-11 RX ADMIN — SODIUM CHLORIDE, SODIUM LACTATE, POTASSIUM CHLORIDE, AND CALCIUM CHLORIDE 125 ML/HR: .6; .31; .03; .02 INJECTION, SOLUTION INTRAVENOUS at 03:24

## 2023-02-11 RX ADMIN — DOCUSATE SODIUM 100 MG: 100 CAPSULE, LIQUID FILLED ORAL at 21:14

## 2023-02-11 RX ADMIN — SODIUM CHLORIDE, SODIUM LACTATE, POTASSIUM CHLORIDE, AND CALCIUM CHLORIDE 125 ML/HR: .6; .31; .03; .02 INJECTION, SOLUTION INTRAVENOUS at 09:15

## 2023-02-11 RX ADMIN — HYDROCORTISONE 1 APPLICATION: 1 CREAM TOPICAL at 21:15

## 2023-02-11 RX ADMIN — Medication 1 APPLICATION: at 21:15

## 2023-02-11 RX ADMIN — IBUPROFEN 600 MG: 600 TABLET, FILM COATED ORAL at 21:14

## 2023-02-11 RX ADMIN — WITCH HAZEL 1 PAD: 500 SOLUTION RECTAL; TOPICAL at 21:15

## 2023-02-11 RX ADMIN — ROPIVACAINE HYDROCHLORIDE: 2 INJECTION, SOLUTION EPIDURAL; INFILTRATION at 07:00

## 2023-02-11 RX ADMIN — ROPIVACAINE HYDROCHLORIDE: 2 INJECTION, SOLUTION EPIDURAL; INFILTRATION at 12:57

## 2023-02-11 RX ADMIN — SODIUM CHLORIDE, SODIUM LACTATE, POTASSIUM CHLORIDE, AND CALCIUM CHLORIDE 925 ML/HR: .6; .31; .03; .02 INJECTION, SOLUTION INTRAVENOUS at 06:50

## 2023-02-11 NOTE — OB LABOR/OXYTOCIN SAFETY PROGRESS
Oxytocin Safety Progress Check Note - Renée Dickerson 40 y o  female MRN: [id number]    Unit/Bed#: -01 Encounter: [id number]    Dose (gio-units/min) Oxytocin: 12 gio-units/min  Contraction Frequency (minutes): 2-3  Contraction Quality: Moderate  Tachysystole: No   Cervical Dilation: 4        Cervical Effacement: 70  Fetal Station: -1  Baseline Rate: 120 bpm  Fetal Heart Rate: 122 BPM  FHR Category: Category I               Vital Signs:   Vitals:    02/11/23 0500   BP: 115/75   Pulse: 60   Resp: 18   Temp: 98 4 °F (36 9 °C)   SpO2:        Notes/comments:   Patient on labor ball and comfortable  FHT category 1 with contractions every 2-3 in  SVE 4/70/-1, and fetal head well applied  AROM performed for clear fluid  Pit running at 12, continue titrating to contractions  Dr Nicki Lemus aware          Miguelito Silva MD 2/11/2023 5:34 AM

## 2023-02-11 NOTE — ANESTHESIA PROCEDURE NOTES
Epidural Block    Patient location during procedure: OB  Start time: 2/11/2023 6:47 AM  Staffing  Performed: CRNA   Resident/CRNA: Moshe Layton CRNA  Preanesthetic Checklist  Completed: patient identified, IV checked, site marked, risks and benefits discussed, surgical consent, monitors and equipment checked, pre-op evaluation and timeout performed  Epidural  Prep: ChloraPrep  Patient monitoring: heart rate and continuous pulse ox  Approach: midline  Location: lumbar  Injection technique: KODY saline  Needle  Needle type: Tuohy   Needle gauge: 20 G  Catheter type: side hole  Catheter size: 22 G  Catheter at skin depth: 13 cm  Catheter securement method: stabilization device  Test dose: negative  Assessment  Sensory level: T10  Number of attempts: 1negative aspiration for CSF, negative aspiration for heme and no paresthesia on injection  patient tolerated the procedure well with no immediate complications

## 2023-02-11 NOTE — OB LABOR/OXYTOCIN SAFETY PROGRESS
Oxytocin Safety Progress Check Note - Renée Dickerson 40 y o  female MRN: [id number]    Unit/Bed#: -01 Encounter: [id number]    Dose (gio-units/min) Oxytocin: 10 gio-units/min  Contraction Frequency (minutes): 2-3  Contraction Quality: Strong  Tachysystole: No   Cervical Dilation: 6        Cervical Effacement: 80  Fetal Station: -1  Baseline Rate: 125 bpm  Fetal Heart Rate: 132 BPM  FHR Category: Category II  Oxytocin Safety Progress Check: Safety check completed            Vital Signs:   Vitals:    02/11/23 0843   BP: 110/70   Pulse: 58   Resp: 18   Temp:    SpO2:        Notes/comments:   Comfortable with epidural  Continue pitocin  Intermittent variable,pos acels, mod variability  Continue pitocin     Silvina Harrison MD 2/11/2023 8:58 AM

## 2023-02-11 NOTE — OB LABOR/OXYTOCIN SAFETY PROGRESS
Oxytocin Safety Progress Check Note - Mahnaz May 40 y o  female MRN: [id number]    Unit/Bed#: -01 Encounter: [id number]    Dose (gio-units/min) Oxytocin: 12 gio-units/min  Contraction Frequency (minutes): 2-4  Contraction Quality: Mild, Moderate  Tachysystole: No   Cervical Dilation: 4        Cervical Effacement: 60  Fetal Station: -2  Baseline Rate: 120 bpm  Fetal Heart Rate: 120 BPM  FHR Category: Category I               Vital Signs:   Vitals:    02/11/23 0324   BP: 103/54   Pulse: 60   Resp:    Temp:    SpO2:        Notes/comments:   Patient sleeping comfortably  FHT category 1 with contractions every 2-4 min  Pit running at 12, continue titrating to contractions    Will defer SVE at this time and plan for check and AROM around 5-6 am         Irineo Dawkins MD 2/11/2023 3:43 AM

## 2023-02-11 NOTE — OB LABOR/OXYTOCIN SAFETY PROGRESS
Oxytocin Safety Progress Check Note - Keith Heath 40 y o  female MRN: [id number]    Unit/Bed#: -01 Encounter: [id number]    Dose (gio-units/min) Oxytocin: 10 gio-units/min  Contraction Frequency (minutes): 2-3  Contraction Quality: Strong  Tachysystole: No   Cervical Dilation: 9        Cervical Effacement: 90  Fetal Station: -1  Baseline Rate: 120 bpm  Fetal Heart Rate: 130 BPM  FHR Category: Category I  Oxytocin Safety Progress Check: Safety check completed            Vital Signs:   Vitals:    02/11/23 0956   BP: 99/60   Pulse: 59   Resp:    Temp:    SpO2:        Notes/comments:    Comfortable    Continue pitocin titration    Silvina Dowd MD 2/11/2023 10:15 AM

## 2023-02-11 NOTE — ANESTHESIA PREPROCEDURE EVALUATION
Procedure:  LABOR ANALGESIA    Relevant Problems   GYN   (+) 39 weeks gestation of pregnancy   (+) AMA (advanced maternal age) multigravida 33+, third trimester      NEURO/PSYCH   (+) Current mild episode of major depressive disorder without prior episode (HCC)   (+) LIVIER (generalized anxiety disorder)        Physical Exam    Airway    Mallampati score: II  TM Distance: >3 FB  Neck ROM: full     Dental   No notable dental hx     Cardiovascular      Pulmonary      Other Findings        Anesthesia Plan  ASA Score- 2     Anesthesia Type- epidural with ASA Monitors  Additional Monitors:   Airway Plan:     Comment: Patient examined, history reviewed  Labor epidural explained, risks and benefits discussed  Consent has been signed          Plan Factors-Exercise tolerance (METS): >4 METS  Chart reviewed  Existing labs reviewed  Patient summary reviewed  Induction-     Postoperative Plan-     Informed Consent- Anesthetic plan and risks discussed with patient  I personally reviewed this patient with the CRNA  Discussed and agreed on the Anesthesia Plan with the CRNA  Bob Joiner

## 2023-02-11 NOTE — DISCHARGE SUMMARY
Obstetrics Discharge Summary  Demarcus Hernandez 40 y o  female MRN: [id number]  Unit/Bed#: -01 Encounter: [id number]    Admission Date: 2/10/2023     Discharge Date: 23    Admitting Attending: Dr Megan Muñoz  Delivery Attending: Dr Megan Muñoz  Discharging Attending: Dr Aurora Morales    Admitting Diagnoses:   Pregnancy at 39w  IVF pregnancy  AMA  Anxiety    Discharge Diagnoses:   Same, delivered  3rd degree perineal laceration    Procedures: vacuum-assisted vaginal delivery, 3rd degree perineal laceration repair    Anesthesia: epidural    Hospital course: Demarcus Hernandez is now a 40 y o  [id number] who was initially admitted at 39w6d for elective induction of labor  A griffith balloon was placed and she was started on pitocin  She received an epidural for analgesia, and an amniotomy was performed for clear fluid  She progressed to complete cervical dilation and began pushing  On 23 she delivered a viable male  40w0d with vacuum assistance  She sustained a 3rd degree laceration during delivery which was adequately repaired  's birth weight was 8lbs 13 5oz; Apgars were 7 (1 min) and 9 (5 min)   was admitted to the  nursery  Patient tolerated the procedure well  Her post-delivery course was uncomplicated  Her postpartum pain was well controlled with oral analgesics  Maternal blood type is O positive so RhoGAM was not indicated  On day of discharge, she was ambulating and able to reasonably perform all ADLs  She was voiding and had appropriate bowel function  Pain was well controlled  She was discharged home on postpartum day #2 without complications  Patient was instructed to follow up with her OBGYN as an outpatient and was given appropriate warnings to call provider if she develops signs of infection or uncontrolled pain      Complications: none apparent    Condition at discharge: good     Provisions for Follow-Up Care:  Please see after visit summary for information related to follow-up care and any pertinent home health orders  Disposition: Home    Planned Readmission: No    Discharge Medications:   Please see AVS for a complete list of discharge medications  Discharge instructions :   Please see AVS for complete discharge instructions      Olivia Gamboa MD  OBGYN Resident  02/13/23  10:43 AM

## 2023-02-11 NOTE — PLAN OF CARE
Problem: PAIN - ADULT  Goal: Verbalizes/displays adequate comfort level or baseline comfort level  Description: Interventions:  - Encourage patient to monitor pain and request assistance  - Assess pain using appropriate pain scale  - Administer analgesics based on type and severity of pain and evaluate response  - Implement non-pharmacological measures as appropriate and evaluate response  - Consider cultural and social influences on pain and pain management  - Notify physician/advanced practitioner if interventions unsuccessful or patient reports new pain  Outcome: Progressing     Problem: INFECTION - ADULT  Goal: Absence or prevention of progression during hospitalization  Description: INTERVENTIONS:  - Assess and monitor for signs and symptoms of infection  - Monitor lab/diagnostic results  - Monitor all insertion sites, i e  indwelling lines, tubes, and drains  - Monitor endotracheal if appropriate and nasal secretions for changes in amount and color  - [place] appropriate cooling/warming therapies per order  - Administer medications as ordered  - Instruct and encourage patient and family to use good hand hygiene technique  - Identify and instruct in appropriate isolation precautions for identified infection/condition  Outcome: Progressing  Goal: Absence of fever/infection during neutropenic period  Description: INTERVENTIONS:  - Monitor WBC    Outcome: Progressing     Problem: SAFETY ADULT  Goal: Patient will remain free of falls  Description: INTERVENTIONS:  - Educate patient/family on patient safety including physical limitations  - Instruct patient to call for assistance with activity   - Consult OT/PT to assist with strengthening/mobility   - Keep Call bell within reach  - Keep bed low and locked with side rails adjusted as appropriate  - Keep care items and personal belongings within reach  - Initiate and maintain comfort rounds  - Make Fall Risk Sign visible to staff  - Offer Toileting every  Hours, in advance of need  - Initiate/Maintain alarm  - Obtain necessary fall risk management equipmen  - Apply yellow socks and bracelet for high fall risk patients  - Consider moving patient to room near nurses station  Outcome: Progressing     Problem: Knowledge Deficit  Goal: Patient/family/caregiver demonstrates understanding of disease process, treatment plan, medications, and discharge instructions  Description: Complete learning assessment and assess knowledge base    Interventions:  - Provide teaching at level of understanding  - Provide teaching via preferred learning methods  Outcome: Progressing

## 2023-02-11 NOTE — OB LABOR/OXYTOCIN SAFETY PROGRESS
Oxytocin Safety Progress Check Note - Kofi Paul 40 y o  female MRN: [id number]    Unit/Bed#: -01 Encounter: [id number]    Dose (gio-units/min) Oxytocin: 12 gio-units/min  Contraction Frequency (minutes): 2-3  Contraction Quality: Moderate  Tachysystole: No   Cervical Dilation: 4        Cervical Effacement: 70  Fetal Station: -1  Baseline Rate: 120 bpm  Fetal Heart Rate: 121 BPM  FHR Category: Category I               Vital Signs:   Vitals:    02/11/23 0615   BP: 118/67   Pulse:    Resp:    Temp: 98 3 °F (36 8 °C)   SpO2:        Notes/comments:     Cervix unchanged, patient significantly more uncomfortable and desires epidural  FHT category I  Will continue pitocin           Faby Lynch MD 2/11/2023 6:36 AM

## 2023-02-11 NOTE — OB LABOR/OXYTOCIN SAFETY PROGRESS
Oxytocin Safety Progress Check Note - Iris Glow 40 y o  female MRN: [id number]    Unit/Bed#: -01 Encounter: [id number]    Dose (gio-units/min) Oxytocin: 10 gio-units/min  Contraction Frequency (minutes): 2-3  Contraction Quality: Mild  Tachysystole: No   Cervical Dilation: 4        Cervical Effacement: 60  Fetal Station: -2  Baseline Rate: 125 bpm  Fetal Heart Rate: 148 BPM  FHR Category: Category I               Vital Signs:   Vitals:    02/11/23 0035   BP: 112/62   Pulse: 65   Resp:    Temp:    SpO2:        Notes/comments:   Cervix unchanged, contacting every 2 minutes, patient comfortable  FHT category I  Continue Pitocin titration             Nadiajuan jose Bae MD 2/11/2023 12:51 AM

## 2023-02-11 NOTE — PLAN OF CARE
Problem: PAIN - ADULT  Goal: Verbalizes/displays adequate comfort level or baseline comfort level  Description: Interventions:  - Encourage patient to monitor pain and request assistance  - Assess pain using appropriate pain scale  - Administer analgesics based on type and severity of pain and evaluate response  - Implement non-pharmacological measures as appropriate and evaluate response  - Consider cultural and social influences on pain and pain management  - Notify physician/advanced practitioner if interventions unsuccessful or patient reports new pain  Outcome: Progressing     Problem: INFECTION - ADULT  Goal: Absence or prevention of progression during hospitalization  Description: INTERVENTIONS:  - Assess and monitor for signs and symptoms of infection  - Monitor lab/diagnostic results  - Monitor all insertion sites, i e  indwelling lines, tubes, and drains  - Monitor endotracheal if appropriate and nasal secretions for changes in amount and color  - [place] appropriate cooling/warming therapies per order  - Administer medications as ordered  - Instruct and encourage patient and family to use good hand hygiene technique  - Identify and instruct in appropriate isolation precautions for identified infection/condition  Outcome: Progressing  Goal: Absence of fever/infection during neutropenic period  Description: INTERVENTIONS:  - Monitor WBC    Outcome: Progressing     Problem: SAFETY ADULT  Goal: Patient will remain free of falls  Description: INTERVENTIONS:  - Educate patient/family on patient safety including physical limitations  - Instruct patient to call for assistance with activity   - Consult OT/PT to assist with strengthening/mobility   - Keep Call bell within reach  - Keep bed low and locked with side rails adjusted as appropriate  - Keep care items and personal belongings within reach  - Initiate and maintain comfort rounds  - Make Fall Risk Sign visible to staff  - Offer Toileting every  Hours, in advance of need  - Initiate/Maintain alarm  - Obtain necessary fall risk management equipment  - Apply yellow socks and bracelet for high fall risk patients  - Consider moving patient to room near nurses station  Outcome: Progressing     Problem: Knowledge Deficit  Goal: Patient/family/caregiver demonstrates understanding of disease process, treatment plan, medications, and discharge instructions  Description: Complete learning assessment and assess knowledge base  Interventions:  - Provide teaching at level of understanding  - Provide teaching via preferred learning methods  Outcome: Progressing  Goal: Verbalizes understanding of labor plan  Description: Assess patient/family/caregiver's baseline knowledge level and ability to understand information  Provide education via patient/family/caregiver's preferred learning method at appropriate level of understanding  1  Provide teaching at level of understanding  2  Provide teaching via preferred learning method(s)  Outcome: Progressing     Problem: DISCHARGE PLANNING  Goal: Discharge to home or other facility with appropriate resources  Description: INTERVENTIONS:  - Identify barriers to discharge w/patient and caregiver  - Arrange for needed discharge resources and transportation as appropriate  - Identify discharge learning needs (meds, wound care, etc )  - Arrange for interpretive services to assist at discharge as needed  - Refer to Case Management Department for coordinating discharge planning if the patient needs post-hospital services based on physician/advanced practitioner order or complex needs related to functional status, cognitive ability, or social support system  Outcome: Progressing     Problem: Labor & Delivery  Goal: Manages discomfort  Description: Assess and monitor for signs and symptoms of discomfort  Assess patient's pain level regularly and per hospital policy  Administer medications as ordered   Support use of nonpharmacological methods to help control pain such as distraction, imagery, relaxation, and application of heat and cold  Collaborate with interdisciplinary team and patient to determine appropriate pain management plan  1  Include patient in decisions related to comfort  2  Offer non-pharmacological pain management interventions  3  Report ineffective pain management to physician  Outcome: Progressing  Goal: Patient vital signs are stable  Description: 1  Assess vital signs - vaginal delivery    Outcome: Progressing     Problem: BIRTH - VAGINAL/ SECTION  Goal: Fetal and maternal status remain reassuring during the birth process  Description: INTERVENTIONS:  - Monitor vital signs  - Monitor fetal heart rate  - Monitor uterine activity  - Monitor labor progression (vaginal delivery)  - DVT prophylaxis  - Antibiotic prophylaxis  Outcome: Progressing  Goal: Emotionally satisfying birthing experience for mother/fetus  Description: Interventions:  - Assess, plan, implement and evaluate the nursing care given to the patient in labor  - Advocate the philosophy that each childbirth experience is a unique experience and support the family's chosen level of involvement and control during the labor process   - Actively participate in both the patient's and family's teaching of the birth process  - Consider cultural, Mandaen and age-specific factors and plan care for the patient in labor  Outcome: Progressing

## 2023-02-11 NOTE — OB LABOR/OXYTOCIN SAFETY PROGRESS
Oxytocin Safety Progress Check Note - Army Nelson 40 y o  female MRN: [id number]    Unit/Bed#: -01 Encounter: [id number]    Dose (gio-units/min) Oxytocin: 10 gio-units/min  Contraction Frequency (minutes): 2-4  Contraction Quality: Moderate  Tachysystole: No   Cervical Dilation: 10  Dilation Complete Date: 23  Dilation Complete Time: 1240  Cervical Effacement: 100  Fetal Station: 3  Baseline Rate: 125 bpm  Fetal Heart Rate: 134 BPM  FHR Category: Category I  Oxytocin Safety Progress Check: Safety check completed            Vital Signs:   Vitals:    23 1412   BP: 112/65   Pulse: 62   Resp:    Temp:    SpO2:        Notes/comments:   Pt has been pushing for 2 hours  REINALDO position  Fetal station remains at +3  Maternal effort has been good  There has been no descent  Discussed with pt and her family continuing to push, vs laboring down vs     Would not offer VAVD at this point as there has been no descent and EFW 9lbs  Will labor down for 30-45 mins then Maria Gaffney MD 2023 2:51 PM

## 2023-02-11 NOTE — OB LABOR/OXYTOCIN SAFETY PROGRESS
Labor Progress Note - Sana Jones 40 y o  female MRN: [id number]    Unit/Bed#:  203-01 Encounter: [id number]       Contraction Frequency (minutes): 3-4  Contraction Quality: Mild  Tachysystole: No   Cervical Dilation: 3-4        Cervical Effacement: 60  Fetal Station: -2  Baseline Rate: 125 bpm  Fetal Heart Rate: 144 BPM  FHR Category: Category I               Vital Signs:   Vitals:    02/10/23 1705   BP: 132/80   Pulse: 81   Resp: 18   Temp: 98 7 °F (37 1 °C)   SpO2: 100%       Notes/comments:   Keita balloon dislodged and patient now resting comfortably  SVE 3 5/60/-2  FHT category 1 with contractions every 3-4 min  Will start pitocin  Dr Anitha Gamboa MD 2/10/2023 7:28 PM

## 2023-02-12 PROBLEM — Z37.9 VACUUM-ASSISTED VAGINAL DELIVERY: Status: ACTIVE | Noted: 2022-12-27

## 2023-02-12 RX ORDER — OXYCODONE HYDROCHLORIDE 5 MG/1
5 TABLET ORAL EVERY 4 HOURS PRN
Status: DISCONTINUED | OUTPATIENT
Start: 2023-02-12 | End: 2023-02-13 | Stop reason: HOSPADM

## 2023-02-12 RX ORDER — ACETAMINOPHEN 325 MG/1
650 TABLET ORAL EVERY 6 HOURS SCHEDULED
Status: DISCONTINUED | OUTPATIENT
Start: 2023-02-12 | End: 2023-02-13 | Stop reason: HOSPADM

## 2023-02-12 RX ADMIN — WITCH HAZEL 1 PAD: 500 SOLUTION RECTAL; TOPICAL at 14:57

## 2023-02-12 RX ADMIN — IBUPROFEN 600 MG: 600 TABLET, FILM COATED ORAL at 23:59

## 2023-02-12 RX ADMIN — ACETAMINOPHEN 650 MG: 325 TABLET ORAL at 23:59

## 2023-02-12 RX ADMIN — ACETAMINOPHEN 650 MG: 325 TABLET ORAL at 12:07

## 2023-02-12 RX ADMIN — IBUPROFEN 600 MG: 600 TABLET, FILM COATED ORAL at 04:07

## 2023-02-12 RX ADMIN — OXYCODONE HYDROCHLORIDE 5 MG: 5 TABLET ORAL at 21:18

## 2023-02-12 RX ADMIN — IBUPROFEN 600 MG: 600 TABLET, FILM COATED ORAL at 10:10

## 2023-02-12 RX ADMIN — ACETAMINOPHEN 650 MG: 325 TABLET ORAL at 18:02

## 2023-02-12 RX ADMIN — DOCUSATE SODIUM 100 MG: 100 CAPSULE, LIQUID FILLED ORAL at 18:02

## 2023-02-12 RX ADMIN — Medication 1 APPLICATION: at 14:57

## 2023-02-12 RX ADMIN — DOCUSATE SODIUM 100 MG: 100 CAPSULE, LIQUID FILLED ORAL at 08:08

## 2023-02-12 RX ADMIN — ACETAMINOPHEN 650 MG: 325 TABLET ORAL at 01:26

## 2023-02-12 RX ADMIN — ACETAMINOPHEN 650 MG: 325 TABLET ORAL at 06:13

## 2023-02-12 RX ADMIN — IBUPROFEN 600 MG: 600 TABLET, FILM COATED ORAL at 17:09

## 2023-02-12 NOTE — PROGRESS NOTES
Progress Note - OB/GYN  Shabana Moss 40 y o  female MRN: [id number]  Unit/Bed#: -01 Encounter: [id number]    Assessment and Plan:  Shabana Moss is a patient of: [address]  She is PPD# 1 s/p  Vacuum-assisted vaginal delivery  Recovering well and is stable  By issue:      * Vacuum-assisted vaginal delivery  Assessment & Plan  Pain well controlled  Griffith catheter replaced overnight due to urinary retention  Appropriate bowel function  Breastfeeding  Lochia wnl  · Plan to remove griffith around noon pending adequate urine output  · Continue routine postpartum care    Third degree perineal laceration  Assessment & Plan  · Scheduled Motrin and Tylenol  · PRN oxycodone  · Scheduled Colace      Disposition    - Anticipate discharge home on PPD# 1      Subjective/Objective     Chief Complaint: Postpartum State     Subjective:    Shabana Moss is PPD/POD#1 s/p  vacuum-assisted vaginal delivery  She currently has perineal pain that is improving with PO pain medication  She was unable to void after griffith catheter removal, and her griffith was replaced overnight  She is ambulating  Patient is currently passing flatus and has had bowel movement  She is tolerating PO, and denies nausea or vomitting  Patient denies fever, chills, chest pain, shortness of breath, or calf tenderness  Lochia is normal  She is  Breastfeeding  She is recovering well and is stable         Vitals:   /54   Pulse 62   Temp 98 °F (36 7 °C) (Oral)   Resp 18   Ht 5' 4" (1 626 m)   Wt 75 3 kg (166 lb)   LMP 05/06/2022 Comment: denies preg  SpO2 98%   Breastfeeding Yes   BMI 28 49 kg/m²       Intake/Output Summary (Last 24 hours) at 2/12/2023 0847  Last data filed at 2/12/2023 0829  Gross per 24 hour   Intake --   Output 4498 ml   Net -4498 ml       Invasive Devices     Peripheral Intravenous Line  Duration           Peripheral IV 02/10/23 Right;Ventral (anterior) Forearm 1 day          Drain  Duration           Urethral Catheter Non-latex 16 Fr  <1 day                Physical Exam:   GEN: Iris Glow appears well, alert and oriented x 3, pleasant and cooperative   CARDIO: RRR, no murmurs or rubs  RESP:  CTAB, no wheezes or rales  ABDOMEN: soft, no tenderness, no distention, fundus @ U -1  GENITAL: 3rd degree laceration with no excessive swelling or erythema or bleeding  EXTREMITIES: SCDs on, non tender, no erythema      Labs:     Hemoglobin   Date Value Ref Range Status   02/10/2023 12 1 11 5 - 15 4 g/dL Final   12/27/2022 10 8 (L) 11 5 - 15 4 g/dL Final     WBC   Date Value Ref Range Status   02/10/2023 9 63 4 31 - 10 16 Thousand/uL Final   12/27/2022 7 39 4 31 - 10 16 Thousand/uL Final     Platelets   Date Value Ref Range Status   02/10/2023 234 149 - 390 Thousands/uL Final   12/27/2022 227 149 - 390 Thousands/uL Final     Creatinine   Date Value Ref Range Status   07/29/2020 0 91 0 60 - 1 30 mg/dL Final     Comment:     Standardized to IDMS reference method   12/24/2019 0 82 0 60 - 1 30 mg/dL Final     Comment:     Standardized to IDMS reference method     AST   Date Value Ref Range Status   07/29/2020 10 5 - 45 U/L Final     Comment:       Specimen collection should occur prior to Sulfasalazine administration due to the potential for falsely depressed results  12/24/2019 8 5 - 45 U/L Final     Comment:       Specimen collection should occur prior to Sulfasalazine administration due to the potential for falsely depressed results  ALT   Date Value Ref Range Status   07/29/2020 21 12 - 78 U/L Final     Comment:       Specimen collection should occur prior to Sulfasalazine administration due to the potential for falsely depressed results  12/24/2019 20 12 - 78 U/L Final     Comment:       Specimen collection should occur prior to Sulfasalazine and/or Sulfapyridine administration due to the potential for falsely depressed results             Yomi Paiz MD  2/12/2023  8:47 AM

## 2023-02-12 NOTE — PLAN OF CARE
Problem: PAIN - ADULT  Goal: Verbalizes/displays adequate comfort level or baseline comfort level  Description: Interventions:  - Encourage patient to monitor pain and request assistance  - Assess pain using appropriate pain scale  - Administer analgesics based on type and severity of pain and evaluate response  - Implement non-pharmacological measures as appropriate and evaluate response  - Consider cultural and social influences on pain and pain management  - Notify physician/advanced practitioner if interventions unsuccessful or patient reports new pain  Outcome: Progressing     Problem: INFECTION - ADULT  Goal: Absence or prevention of progression during hospitalization  Description: INTERVENTIONS:  - Assess and monitor for signs and symptoms of infection  - Monitor lab/diagnostic results  - Monitor all insertion sites, i e  indwelling lines, tubes, and drains  - Monitor endotracheal if appropriate and nasal secretions for changes in amount and color  - [place] appropriate cooling/warming therapies per order  - Administer medications as ordered  - Instruct and encourage patient and family to use good hand hygiene technique  - Identify and instruct in appropriate isolation precautions for identified infection/condition  Outcome: Progressing  Goal: Absence of fever/infection during neutropenic period  Description: INTERVENTIONS:  - Monitor WBC    Outcome: Progressing     Problem: SAFETY ADULT  Goal: Patient will remain free of falls  Description: INTERVENTIONS:  - Educate patient/family on patient safety including physical limitations  - Instruct patient to call for assistance with activity   - Consult OT/PT to assist with strengthening/mobility   - Keep Call bell within reach  - Keep bed low and locked with side rails adjusted as appropriate  - Keep care items and personal belongings within reach  - Initiate and maintain comfort rounds  - Make Fall Risk Sign visible to staff  - Offer Toileting every 2 Hours, in advance of need  - Initiate/Maintain alarm  - Obtain necessary fall risk management equipment:   - Apply yellow socks and bracelet for high fall risk patients  - Consider moving patient to room near nurses station  Outcome: Progressing     Problem: DISCHARGE PLANNING  Goal: Discharge to home or other facility with appropriate resources  Description: INTERVENTIONS:  - Identify barriers to discharge w/patient and caregiver  - Arrange for needed discharge resources and transportation as appropriate  - Identify discharge learning needs (meds, wound care, etc )  - Arrange for interpretive services to assist at discharge as needed  - Refer to Case Management Department for coordinating discharge planning if the patient needs post-hospital services based on physician/advanced practitioner order or complex needs related to functional status, cognitive ability, or social support system  Outcome: Progressing     Problem: POSTPARTUM  Goal: Experiences normal postpartum course  Description: INTERVENTIONS:  - Monitor maternal vital signs  - Assess uterine involution and lochia  Outcome: Progressing  Goal: Appropriate maternal -  bonding  Description: INTERVENTIONS:  - Identify family support  - Assess for appropriate maternal/infant bonding   -Encourage maternal/infant bonding opportunities  - Referral to  or  as needed  Outcome: Progressing  Goal: Establishment of infant feeding pattern  Description: INTERVENTIONS:  - Assess breast/bottle feeding  - Refer to lactation as needed  Outcome: Progressing  Goal: Incision(s), wounds(s) or drain site(s) healing without S/S of infection  Description: INTERVENTIONS  - Assess and document dressing, incision, wound bed, drain sites and surrounding tissue  - Provide patient and family education  - Perform skin care/dressing changes every   Outcome: Progressing

## 2023-02-12 NOTE — ANESTHESIA POSTPROCEDURE EVALUATION
Post-Op Assessment Note               Post-op block assessment: catheter intact and no complications      No notable events documented      BP      Temp      Pulse     Resp      SpO2

## 2023-02-12 NOTE — PLAN OF CARE
Problem: PAIN - ADULT  Goal: Verbalizes/displays adequate comfort level or baseline comfort level  Description: Interventions:  - Encourage patient to monitor pain and request assistance  - Assess pain using appropriate pain scale  - Administer analgesics based on type and severity of pain and evaluate response  - Implement non-pharmacological measures as appropriate and evaluate response  - Consider cultural and social influences on pain and pain management  - Notify physician/advanced practitioner if interventions unsuccessful or patient reports new pain  Outcome: Progressing     Problem: INFECTION - ADULT  Goal: Absence or prevention of progression during hospitalization  Description: INTERVENTIONS:  - Assess and monitor for signs and symptoms of infection  - Monitor lab/diagnostic results  - Monitor all insertion sites, i e  indwelling lines, tubes, and drains  - Monitor endotracheal if appropriate and nasal secretions for changes in amount and color  - [place] appropriate cooling/warming therapies per order  - Administer medications as ordered  - Instruct and encourage patient and family to use good hand hygiene technique  - Identify and instruct in appropriate isolation precautions for identified infection/condition  Outcome: Progressing  Goal: Absence of fever/infection during neutropenic period  Description: INTERVENTIONS:  - Monitor WBC    Outcome: Progressing     Problem: SAFETY ADULT  Goal: Patient will remain free of falls  Description: INTERVENTIONS:  - Educate patient/family on patient safety including physical limitations  - Instruct patient to call for assistance with activity   - Consult OT/PT to assist with strengthening/mobility   - Keep Call bell within reach  - Keep bed low and locked with side rails adjusted as appropriate  - Keep care items and personal belongings within reach  - Initiate and maintain comfort rounds  - Make Fall Risk Sign visible to staff  - Apply yellow socks and bracelet for high fall risk patients  - Consider moving patient to room near nurses station  Outcome: Progressing     Problem: DISCHARGE PLANNING  Goal: Discharge to home or other facility with appropriate resources  Description: INTERVENTIONS:  - Identify barriers to discharge w/patient and caregiver  - Arrange for needed discharge resources and transportation as appropriate  - Identify discharge learning needs (meds, wound care, etc )  - Arrange for interpretive services to assist at discharge as needed  - Refer to Case Management Department for coordinating discharge planning if the patient needs post-hospital services based on physician/advanced practitioner order or complex needs related to functional status, cognitive ability, or social support system  Outcome: Progressing     Problem: POSTPARTUM  Goal: Experiences normal postpartum course  Description: INTERVENTIONS:  - Monitor maternal vital signs  - Assess uterine involution and lochia  Outcome: Progressing  Goal: Appropriate maternal -  bonding  Description: INTERVENTIONS:  - Identify family support  - Assess for appropriate maternal/infant bonding   -Encourage maternal/infant bonding opportunities  - Referral to  or  as needed  Outcome: Progressing  Goal: Establishment of infant feeding pattern  Description: INTERVENTIONS:  - Assess breast/bottle feeding  - Refer to lactation as needed  Outcome: Progressing  Goal: Incision(s), wounds(s) or drain site(s) healing without S/S of infection  Description: INTERVENTIONS  - Assess and document dressing, incision, wound bed, drain sites and surrounding tissue  - Provide patient and family education  Outcome: Progressing

## 2023-02-12 NOTE — L&D DELIVERY NOTE
DELIVERY NOTE  Kelly Banegas 40 y o  female MRN: [id number]  Unit/Bed#: -01 Encounter: [id number]  [facility] Provider:  Charlotte JORDAN:[id number]}    Attending Obstetrician:    Dr Earley Olszewski, *    Assisting Resident:   Dr Cervantes     Pre-Delivery Diagnosis:   IUP @ 40w0d   ***    Post-Delivery Diagnosis:    @ 40w0d   ***    Procedure:  {Procedures; obstetric delivery:::"Spontaneous vaginal delivery"}  Repair of ***    Anesthesia:  ***epidural    Quantitative Blood Loss:   *** cc    Complications:    {Diagnoses; obstetric delivery complications:::"None"}    Cord Gases:  Umbilical Cord Venous Blood Gas:  Results from last 7 days   Lab Units 23  1851   PH COV  7 385   PCO2 COV mm HG 29 9   HCO3 COV mmol/L 17 5   BASE EXC COV mmol/L -6 0*   O2 CT CD VB mL/dL 16 2   O2 HGB, VENOUS CORD % 05 2     Umbilical Cord Arterial Blood Gas:        Specimens:  Placenta; normal appearing***, central insertion***, intact***  Cord blood obtained  Arterial and venous blood gases (below)      Outcomes:  APGARS: *** (1 min) and *** (5 min)  Weight: ***    Description of Labor Course and Delivery:  Kelly Banegas is a 40 y o   female at 40w0d who was admitted to L&D on 2/10/2023 for ***  Her labor course was notable for ***  She progressed to complete at ***, and started pushing at ***  She pushed for *** min  With maternal pushing efforts, the fetal vertex delivered spontaneously  A nuchal cord was *** noted and ***  The anterior left*** right*** shoulder was delivered atraumatically with gentle downward traction  The contralateral arm was delivered with gentle upward traction  The remainder of the fetus delivered spontaneously at ***, resulting in a viable ***male   Upon delivery, the infant was placed on the mothers abdomen and the cord was doubly clamped and cut after at least 30 seconds  The  was noted to have good tone and cry spontaneously   There was no evidence of injury***  The  was passed off to  staff for evaluation***  Umbilical cord blood and umbilical artery and venous gases were collected and sent to the lab  Active management of the third stage of labor was undertaken with administration of IV pitocin at 250***milliunits/min, gentle cord traction, and fundal massage  An intact*** placenta was delivered spontaneously at ***  It was noted to have a centrally***-inserted 3***-vessel cord  It was sent to storage***  Inspection of the perineum, vagina, labia, cervix, and urethra revealed a*** laceration***  This was*** repaired in a *** fashion using 3-0*** Vicryl rapide  Bleeding was noted to be under control  A bimanual exam was performed *** and residual clots were expressed from the uterus  The uterus was noted to remain firm  At the conclusion of the delivery, all needle, sponge, and instrument counts were noted to be correct  Patient tolerated the procedure well and was allowed to recover in labor and delivery room with family and  before being transferred to the post-partum floor  ***    Dr Steph Harkins, * was present for the entire procedure      Maribel Rice MD  PGY-3 OB/GYN   2023 7:56 PM

## 2023-02-12 NOTE — LACTATION NOTE
This note was copied from a baby's chart  CONSULT - LACTATION  Baby Boy (Melissa Mueller) Jensen 1 days male MRN: [id number]    [facility] NURSERY Room / Bed: (N)/(N) Encounter: [id number]    Maternal Information     MOTHER:  Estella Styles  Maternal Age: 40 y o    OB History: # 1 - Date: None, Sex: None, Weight: None, GA: None, Delivery: None, Apgar1: None, Apgar5: None, Living: None, Birth Comments: None    # 2 - Date: 2021, Sex: None, Weight: None, GA: None, Delivery: None, Apgar1: None, Apgar5: None, Living: None, Birth Comments: None    # 3 - Date: 23, Sex: Male, Weight: 4010 g (8 lb 13 5 oz), GA: 40w0d, Delivery: Vaginal, Vacuum (Extractor), Apgar1: 7, Apgar5: 9, Living: Living, Birth Comments: None   Previouse breast reduction surgery? No    Lactation history:   Has patient previously breast fed: No   How long had patient previously breast fed:     Previous breast feeding complications:       Past Surgical History:   Procedure Laterality Date   • TONSILLECTOMY     • WISDOM TOOTH EXTRACTION          Birth information:  YOB: 2022   Time of birth: 6:36 PM   Sex: male   Delivery type: Vaginal, Vacuum (Extractor)   Birth Weight: 4010 g (8 lb 13 5 oz)   Percent of Weight Change: 0%     Gestational Age: 37w0d   [unfilled]    Assessment     Breast and nipple assessment: inverted nipple     Assessment: normal assessment    Feeding assessment: no latch  LATCH:  Latch: Repeated attempts, hold nipple in mouth, stimulate to suck   Audible Swallowing: A few with stimulation   Type of Nipple: Inverted   Comfort (Breast/Nipple): Soft/non-tender   Hold (Positioning): Full assist, staff holds infant at breast   LATCH Score: 4          Feeding recommendations:  attempt baby at the breast every 2-3 hours  Met with Mina Adams who is attempting to breastfeed her baby boy    Baby has been sleepy and mom reports that when she attempts baby at the breast , baby will open his mouth wide but does not suck at the breast  Baby is presently sleepy  Assessed baby's mouth with a gloved finger  No abnormalities noted  Demonstrated hand expression and mom was able to return demonstration  Instructed mom to call when baby is showing cues for latch assistance  Phone number provided  Provided Estella with the Ready Set Baby Booklet which contained information on:  Hand expression with access to QR codes to review hand expression  Positioning and latch reviewed as well as showing images of other feeding positions  Discussed the properties of a good latch in any position  Feeding on cue and what that means for recognizing infant's hunger, s/s that baby is getting enough milk and some s/s that breastfeeding dyad may need further help  Skin to Skin contact and benefits to mom and baby  Avoidance of pacifiers for the first month discussed  Gave information on common concerns, what to expect the first few weeks after delivery, preparing for other caregivers, and how partners can help  Resources for support also provided                Tricia Godfrey RN 2/12/2023 6:14 PM

## 2023-02-12 NOTE — L&D DELIVERY NOTE
Obstetrician:  Dr Aixa Velazquez  Assistant: Dr Hannah Power    Pre-Delivery Diagnosis:   Term pregnancy  Single fetus  IVF pregnancy    Post-Delivery Diagnosis:  Same as above - Delivered  3c degree laceration      Procedure:   1  vacuum extraction delivery  2  Repair of none and 3c degree    Indications for instrumental delivery:  Prolonged second stage of labor  Maternal exhaustion      Anesthesia: epidural    Quantified Blood Loss: pending at time of note    Complications:  None    Brief History and Labor Course:    Janell Albert is a 40 y o   with an MELISSA of 2023, by Embryo Transfer at 40w0d weeks gestation who was admitted for Induction of labor  Please see labor timeline for complete labor course  The patient was completely dilated at 1240  She began to push at 1245  Description of Delivery:   Due to Prolonged second stage of labor  And maternal exhaustion, vacuum assisted delivery was recommended after pushing for 5 hours and 35 minutes  The pt was consented verbally and agreed  NICU was notified  The fetus was noted to be in the REINALDO position, +4 station  EFW 9  Bladder was drained  Anesthesia was adequate  The Kiwi cup was applied to the fetal median flexion point and centered over the sagittal sutures approx 3 cm anterior to the posterior fontanelle  There was no vaginal or cervical tissue at the margin  With the start of the next contraction, a vacuum seal was established to a max of 600mmHg and gentle traction was applied  Advancement of the fetal head was noted with gentle traction on the suction device  The vacuum was applied at 1820 hours  1  5 pulls were performed  2  0 number of pop-offs occurred  3  1 number of re applications were performed  The vacuum was released upon  of the fetal head and delivery was performed thereafter   There was not a nuchal   With the assistance of maternal expulsive efforts and downward traction of fetal head, the anterior shoulder was delivered without difficulty, followed by the remainder of the infant's body  The infant was placed on the mother's abdomen and the umbilical cord was doubly clamped and cut  The awaiting nurse resuscitator was there for further evaluation of the infant  Umbilical cord blood and umbilical artery and venous gases were collected  IV pitocin was started during the 3rd stage to control bleeding  The placenta was manually extracted  It was intact with a centrally inserted 3-vessel cord  A bimanual was performed and the uterus was noted to be firm and contracted  Inspection of the perineum, vagina, labia, and urethra revealed none and 3c degree  Under adequate anesthesia, the apex the vaginal laceration was identified and an anchoring suture was placed 1 cm above the apex  The vaginal mucosa and underlying rectovaginal fascia were closed using a running locked 2 0 Vicryl-CT 1 suture to the hymenal ring  The suture was then brought underneath the hymenal ring  The internal anal sphincter was identified and grasped with an Allis on the either muscle end bilaterally  The internal anal sphincter was closed with continuous 3 0 Vicryl suture  The external anal sphincter ends were grasped with an Allis and using 2 0 PDS suture, the ends were brought end to end and in 3 figure of eight sutures were placed, with care taken to incorporate the muscle capsule in the closure  Using a 2nd suture, the transverse perineal muscles were reapproximated with 3 0 vicryl sutures  A single interrupted suture was then placed through the bulbocavernosus muscle  The first suture was then used to reapproximate the perineum in running fashion to the posterior apex of the skin laceration and then the skin was reapproximated in a subcuticular fashion to the hymenal ring  Excellent hemostasis was achieved  Mother and baby are currently recovering in the LDR in stable condition    I was present for and participated in all key portions of the procedure  All sponge, needle and instrument counts were noted to be correct x2

## 2023-02-12 NOTE — PLAN OF CARE
Problem: Knowledge Deficit  Goal: Patient/family/caregiver demonstrates understanding of disease process, treatment plan, medications, and discharge instructions  Description: Complete learning assessment and assess knowledge base  Interventions:  - Provide teaching at level of understanding  - Provide teaching via preferred learning methods  Outcome: Completed  Goal: Verbalizes understanding of labor plan  Description: Assess patient/family/caregiver's baseline knowledge level and ability to understand information  Provide education via patient/family/caregiver's preferred learning method at appropriate level of understanding  1  Provide teaching at level of understanding  2  Provide teaching via preferred learning method(s)  Outcome: Completed     Problem: Labor & Delivery  Goal: Manages discomfort  Description: Assess and monitor for signs and symptoms of discomfort  Assess patient's pain level regularly and per hospital policy  Administer medications as ordered  Support use of nonpharmacological methods to help control pain such as distraction, imagery, relaxation, and application of heat and cold  Collaborate with interdisciplinary team and patient to determine appropriate pain management plan  1  Include patient in decisions related to comfort  2  Offer non-pharmacological pain management interventions  3  Report ineffective pain management to physician  Outcome: Completed  Goal: Patient vital signs are stable  Description: 1  Assess vital signs - vaginal delivery    Outcome: Completed     Problem: BIRTH - VAGINAL/ SECTION  Goal: Fetal and maternal status remain reassuring during the birth process  Description: INTERVENTIONS:  - Monitor vital signs  - Monitor fetal heart rate  - Monitor uterine activity  - Monitor labor progression (vaginal delivery)  - DVT prophylaxis  - Antibiotic prophylaxis  Outcome: Completed  Goal: Emotionally satisfying birthing experience for mother/fetus  Description: Interventions:  - Assess, plan, implement and evaluate the nursing care given to the patient in labor  - Advocate the philosophy that each childbirth experience is a unique experience and support the family's chosen level of involvement and control during the labor process   - Actively participate in both the patient's and family's teaching of the birth process  - Consider cultural, [employer] and age-specific factors and plan care for the patient in labor  Outcome: Completed

## 2023-02-13 VITALS
DIASTOLIC BLOOD PRESSURE: 70 MMHG | BODY MASS INDEX: 28.34 KG/M2 | OXYGEN SATURATION: 98 % | WEIGHT: 166 LBS | HEART RATE: 60 BPM | TEMPERATURE: 97.7 F | SYSTOLIC BLOOD PRESSURE: 112 MMHG | HEIGHT: 64 IN | RESPIRATION RATE: 18 BRPM

## 2023-02-13 RX ORDER — DIAPER,BRIEF,INFANT-TODD,DISP
1 EACH MISCELLANEOUS DAILY PRN
Qty: 30 G | Refills: 0 | Status: SHIPPED | OUTPATIENT
Start: 2023-02-13 | End: 2023-02-20 | Stop reason: ALTCHOICE

## 2023-02-13 RX ORDER — IBUPROFEN 600 MG/1
600 TABLET ORAL EVERY 6 HOURS
Qty: 30 TABLET | Refills: 0 | Status: SHIPPED | OUTPATIENT
Start: 2023-02-13

## 2023-02-13 RX ORDER — ACETAMINOPHEN 325 MG/1
650 TABLET ORAL EVERY 6 HOURS SCHEDULED
Refills: 0
Start: 2023-02-13 | End: 2023-02-20 | Stop reason: ALTCHOICE

## 2023-02-13 RX ORDER — DOCUSATE SODIUM 100 MG/1
100 CAPSULE, LIQUID FILLED ORAL 2 TIMES DAILY
Refills: 0
Start: 2023-02-13

## 2023-02-13 RX ORDER — SIMETHICONE 80 MG
80 TABLET,CHEWABLE ORAL 4 TIMES DAILY PRN
Qty: 30 TABLET | Refills: 0 | Status: SHIPPED | OUTPATIENT
Start: 2023-02-13 | End: 2023-02-20 | Stop reason: ALTCHOICE

## 2023-02-13 RX ADMIN — IBUPROFEN 600 MG: 600 TABLET, FILM COATED ORAL at 04:59

## 2023-02-13 RX ADMIN — DOCUSATE SODIUM 100 MG: 100 CAPSULE, LIQUID FILLED ORAL at 08:42

## 2023-02-13 RX ADMIN — ACETAMINOPHEN 650 MG: 325 TABLET ORAL at 04:59

## 2023-02-13 RX ADMIN — IBUPROFEN 600 MG: 600 TABLET, FILM COATED ORAL at 12:00

## 2023-02-13 RX ADMIN — ACETAMINOPHEN 650 MG: 325 TABLET ORAL at 12:00

## 2023-02-13 NOTE — LACTATION NOTE
This note was copied from a baby's chart  Discharge Lactation: mom states baby has not latched to the breast since first feeding with lactation this am      Ed  On timing of feeds and signs of satiation  Attempted baby on both breasts without a deep latch  Warmed 15 mls of DBM for baby and attempted with SNS  Deep latch on the left breast  Practiced burping / stimulation and brought baby to the right breast with SNS  Some latch with sucking  Detailed education on positioning at the breast, how to elongate the nipple, and baby's chin deep into the breast      Reviewed mix feedings and alternative feeding methods  Mom wants 10 bottles of DBM for home  Lot#  [id number]  Date  582710-4(9) 7/2/23  120094-2(0) 7/2/23  930610-5(5) 7/2/23  930817-9(46) 7/2/23  197068-9(92) 7/2/23  196685-4(75) 7/2/23  049258-3(45) 7/2/23  769533-1(17) 7/2/23  671491-1(50) 7/2/23  836320-6(62) 7/2/23    Ed  On use of SNS, cleaning and use  Ed  On paced bottle feeding    Ed  On cluster feeding  Enc  Calling baby and me if milk does not come in and before scheduled appt  Discharge feeding plan    1  Meet early feeding cues  2  Use massage, warmth, hand expression to stimulate breasts  3  Pump (btw  5-10 min ) the side the baby is going to latch  This will assist in elongating the nipple  4  Set up SNS to the breast  5  Bring baby to breast skin to skin  6  "U" shape hold of the breast  7  Align nipple to nose, drag nipple to chin, (move baby not breast) and bring baby to breast when mouth is wide and deep latch is achieved  8  Unclip SNS to allow flow  9  From "U" shape hold under the breast, move fingers to provide tea-cup hold of the breast at the lower betty of baby's mouth  10  Once baby does not suck with stimulation, becomes fussy, or un-latches feed expressed milk or DBM via alternative feeding method (finger feed, paced bottle)  11  Bring baby back to breast for non-nutritive suck and skin to skin  12   Pump after each feed to stimulate breasts and have expressed milk for next feed    When not using SNS, use the paced bottle feeding method for feeds

## 2023-02-13 NOTE — PLAN OF CARE
Problem: PAIN - ADULT  Goal: Verbalizes/displays adequate comfort level or baseline comfort level  Description: Interventions:  - Encourage patient to monitor pain and request assistance  - Assess pain using appropriate pain scale  - Administer analgesics based on type and severity of pain and evaluate response  - Implement non-pharmacological measures as appropriate and evaluate response  - Consider cultural and social influences on pain and pain management  - Notify physician/advanced practitioner if interventions unsuccessful or patient reports new pain  Outcome: Progressing     Problem: INFECTION - ADULT  Goal: Absence or prevention of progression during hospitalization  Description: INTERVENTIONS:  - Assess and monitor for signs and symptoms of infection  - Monitor lab/diagnostic results  - Monitor all insertion sites, i e  indwelling lines, tubes, and drains  - Monitor endotracheal if appropriate and nasal secretions for changes in amount and color  - [place] appropriate cooling/warming therapies per order  - Administer medications as ordered  - Instruct and encourage patient and family to use good hand hygiene technique  - Identify and instruct in appropriate isolation precautions for identified infection/condition  Outcome: Progressing  Goal: Absence of fever/infection during neutropenic period  Description: INTERVENTIONS:  - Monitor WBC    Outcome: Progressing     Problem: SAFETY ADULT  Goal: Patient will remain free of falls  Description: INTERVENTIONS:  - Educate patient/family on patient safety including physical limitations  - Instruct patient to call for assistance with activity   - Consult OT/PT to assist with strengthening/mobility   - Keep Call bell within reach  - Keep bed low and locked with side rails adjusted as appropriate  - Keep care items and personal belongings within reach  - Initiate and maintain comfort rounds  - Make Fall Risk Sign visible to staff  - Apply yellow socks and bracelet for high fall risk patients  - Consider moving patient to room near nurses station  Outcome: Progressing     Problem: DISCHARGE PLANNING  Goal: Discharge to home or other facility with appropriate resources  Description: INTERVENTIONS:  - Identify barriers to discharge w/patient and caregiver  - Arrange for needed discharge resources and transportation as appropriate  - Identify discharge learning needs (meds, wound care, etc )  - Arrange for interpretive services to assist at discharge as needed  - Refer to Case Management Department for coordinating discharge planning if the patient needs post-hospital services based on physician/advanced practitioner order or complex needs related to functional status, cognitive ability, or social support system  Outcome: Progressing     Problem: POSTPARTUM  Goal: Experiences normal postpartum course  Description: INTERVENTIONS:  - Monitor maternal vital signs  - Assess uterine involution and lochia  Outcome: Progressing  Goal: Appropriate maternal -  bonding  Description: INTERVENTIONS:  - Identify family support  - Assess for appropriate maternal/infant bonding   -Encourage maternal/infant bonding opportunities  - Referral to  or  as needed  Outcome: Progressing  Goal: Establishment of infant feeding pattern  Description: INTERVENTIONS:  - Assess breast/bottle feeding  - Refer to lactation as needed  Outcome: Progressing  Goal: Incision(s), wounds(s) or drain site(s) healing without S/S of infection  Description: INTERVENTIONS  - Assess and document dressing, incision, wound bed, drain sites and surrounding tissue  - Provide patient and family education  Outcome: Progressing

## 2023-02-13 NOTE — LACTATION NOTE
This note was copied from a baby's chart  Discharge Lactation: mom states baby hasn't latched well to the breast      Upon visual assessment; small, firm breasts, conical in shape  Right nipple presents as flat, everts slightly with stimulation, dimpled center  Left nipple presents as slightly inverted, everts with stimulation  Bilateral short shank  No oral assessment completed on baby  Hand expression with teach back completed  Visible colostrum is clear out of right nipple  Brought baby up to the right breast in cross cradle hold  Baby latches, but Mom does not demonstrate confidence in holding the baby to the breast  Switched to left breast in modified laid back hold  Deeper latch with sucking bursts  Sometimes baby loses the latch  Once baby became fussy, 12 mls of DBM were provided via syringe  Ed  On taking Sebastián Guy home  Enc  Mixed feeding  Enc  Both breasts at every feeding  Ed  On cluster feeding  Scheduled baby and me appt for 2/17/23 @ 8 am    Enc  To attempt mix feed and call lactation for assistance  Mom's nipple everts with stimulation  With nipple compression, short shank noted  Education on ways to elongate the nipple: Hand expression, Latch assist, breast shells, supple cups, manual and electric pump stimulation  Slightly wider spacing between breasts  Information on hand expression given  Discussed benefits of knowing how to manually express breast including stimulating milk supply, softening nipple for latch and evacuating breast in the event of engorgement  Education on positioning and alignment   Mom is encouraged to:     - Bring baby up to the breast (use of pillows to elevate so baby's torso is against mom's breasts)   - Skin to skin for feedings with top hand exposed to show signs of satiation   - Chin deep into breast tissue (make baby look up to the nipple)   - nose aligned to the nipple   -Wait for wide gape, drag chin on the breast so nipple is aimed at the upper, back palate  - Cheek should be touching breast   - Deep, firm hold of baby with ear, shoulder, hip alignment    Education on alternative feeding methods  Demonstration and teach back of syringe  Baby took 12 mls of expressed DBM   Encouraged to call lactation for additional assistance with feedings  Mix Feeds:   Start every feeding at the breast  Offer both breasts or one breast and use breast compressions to achieve active suckling  Once baby is not actively suckling, bring baby in upright position and offer expressed milk and/or artificial supplementation via alternative feeding method (syringe, finger, paced bottle feeding)  Burp frequently between breasts and during paced bottle feeding  Once feed is complete, place baby back on breast for on-nutritive suck  Pump after the feeding session to supplement with expressed milk at next feed  Feeding Plan    1  Meet early feeding cues  2  Use breast pump, manual pump, and latch assist to iva nipple  3  Use massage, warmth, hand expression to stimulate breasts  4  Use pillows to bring baby to the breast  5  Bring baby to breast skin to skin  6  Tuck chin deeply into the breast to assist with deeper latch  7  Align nipple to nose, chin deep into breast, (move baby not breast) and bring baby to breast when mouth is wide and deep latch is achieved  8  Use breast compressions to stimulate suck  9  Once baby does not suck with stimulation, becomes fussy, or un-latches feed expressed milk or DBM via alternative feeding method (paced,syringe)  10  Bring baby back to breast for non-nutritive suck and skin to skin  11  Pump after each feed to stimulate breasts and have expressed milk for next feed  12  Wear Supple cups or breast shells between feeds    Provided education on growth spurts, when to introduce bottles; paced bottle feeding, and non-nutritive suck at the breast  Provided education on Signs of satiation   Encouraged to call lactation to observe a latch prior to discharge for reassurance  Encouraged to call baby and me with any questions and closely monitor output

## 2023-02-13 NOTE — DISCHARGE INSTR - ACTIVITY
Mix Feeds:   Start every feeding at the breast  Offer both breasts or one breast and use breast compressions to achieve active suckling  Once baby is not actively suckling, bring baby in upright position and offer expressed milk and/or artificial supplementation via alternative feeding method (syringe, finger, paced bottle feeding)  Burp frequently between breasts and during paced bottle feeding  Once feed is complete, place baby back on breast for on-nutritive suck  Pump after the feeding session to supplement with expressed milk at next feed  Feeding Plan     1  Meet early feeding cues  2  Use breast pump, manual pump, and latch assist to iva nipple  3  Use massage, warmth, hand expression to stimulate breasts  4  Use pillows to bring baby to the breast  5  Bring baby to breast skin to skin  6  Tuck chin deeply into the breast to assist with deeper latch  7  Align nipple to nose, chin deep into breast, (move baby not breast) and bring baby to breast when mouth is wide and deep latch is achieved  8  Use breast compressions to stimulate suck  9  Once baby does not suck with stimulation, becomes fussy, or un-latches feed expressed milk or DBM via alternative feeding method (paced,syringe)  10  Bring baby back to breast for non-nutritive suck and skin to skin  11  Pump after each feed to stimulate breasts and have expressed milk for next feed  12   Wear Supple cups or breast shells between feeds

## 2023-02-13 NOTE — PROGRESS NOTES
Progress Note - OB/GYN  Jose R Conroy 40 y o  female MRN: [id number]  Unit/Bed#: -01 Encounter: [id number]    Assessment and Plan:  Jose R Conroy is a patient of: [address]  She is PPD# 2 s/p  vacuum-assisted vaginal delivery  Recovering well and is stable  By issue:      * Vacuum-assisted vaginal delivery  Assessment & Plan  Pain well controlled  Appropriate bowel function  Breastfeeding  Lochia wnl  She is voiding without difficulty  Third degree perineal laceration  Assessment & Plan  · Scheduled Motrin and Tylenol  · PRN oxycodone  · Scheduled Colace        Disposition    - Anticipate discharge home on PPD# 2 pending pain control   - patient declines roxicodone  She will do sitz baths at home  Subjective/Objective     Chief Complaint: Postpartum State     Subjective:    Jose R Conroy is PPD#2 s/p  vacuum-assisted vaginal delivery  She has complaints of pain at her laceration  She is alternating tylenol and motrin  She declines roxicodone  Patient is currently voiding  She is ambulating  Patient is currently passing flatus and has had a small bowel movement  She is tolerating PO, and denies nausea or vomitting  Patient denies fever, chills, chest pain, shortness of breath, or calf tenderness  Lochia is normal  She is  Breastfeeding  She is recovering well and is stable         Vitals:   /75   Pulse 79   Temp 98 5 °F (36 9 °C) (Oral)   Resp 18   Ht 5' 4" (1 626 m)   Wt 75 3 kg (166 lb)   LMP 05/06/2022 Comment: denies preg  SpO2 98%   Breastfeeding Yes   BMI 28 49 kg/m²       Intake/Output Summary (Last 24 hours) at 2/13/2023 5148  Last data filed at 2/12/2023 2358  Gross per 24 hour   Intake --   Output 1850 ml   Net -1850 ml       Invasive Devices     Peripheral Intravenous Line  Duration           Peripheral IV 02/10/23 Right;Ventral (anterior) Forearm 2 days                Physical Exam:   GEN: Jose R Conroy appears well, alert and oriented x 3, pleasant and cooperative   CARDIO: warm and well-perfused  RESP: non-labored breathing  ABDOMEN: soft, no tenderness, no distention, fundus @ U-2  EXTREMITIES: non tender    Labs:     Hemoglobin   Date Value Ref Range Status   02/10/2023 12 1 11 5 - 15 4 g/dL Final   12/27/2022 10 8 (L) 11 5 - 15 4 g/dL Final     WBC   Date Value Ref Range Status   02/10/2023 9 63 4 31 - 10 16 Thousand/uL Final   12/27/2022 7 39 4 31 - 10 16 Thousand/uL Final     Platelets   Date Value Ref Range Status   02/10/2023 234 149 - 390 Thousands/uL Final   12/27/2022 227 149 - 390 Thousands/uL Final     Creatinine   Date Value Ref Range Status   07/29/2020 0 91 0 60 - 1 30 mg/dL Final     Comment:     Standardized to IDMS reference method   12/24/2019 0 82 0 60 - 1 30 mg/dL Final     Comment:     Standardized to IDMS reference method     AST   Date Value Ref Range Status   07/29/2020 10 5 - 45 U/L Final     Comment:       Specimen collection should occur prior to Sulfasalazine administration due to the potential for falsely depressed results  12/24/2019 8 5 - 45 U/L Final     Comment:       Specimen collection should occur prior to Sulfasalazine administration due to the potential for falsely depressed results  ALT   Date Value Ref Range Status   07/29/2020 21 12 - 78 U/L Final     Comment:       Specimen collection should occur prior to Sulfasalazine administration due to the potential for falsely depressed results  12/24/2019 20 12 - 78 U/L Final     Comment:       Specimen collection should occur prior to Sulfasalazine and/or Sulfapyridine administration due to the potential for falsely depressed results           Omari Cedeno DO   4/07/9439  6:55 AM

## 2023-02-13 NOTE — PROGRESS NOTES
Patient chose to have [facility] Non-invasive Prenatal Screen  Patient given brochure and is aware [facility] will contact patients insurance and coordinate coverage  Patient made aware she will need to respond to text message or e-mail from [employer] within 2 business days or testing will be run through insurance  Patient informed text message will come from area code  "415"  Provided [employer] # [phone] and web site : [email or web address]     2 vials of blood drawn from left arm, patient tolerated blood draw without difficulty  Specimens labeled with patient identifiers (name, date of birth, specimen collection date), order and specimen was verified with patient, packed and sent via [address]  Copy of lab order scanned to Epic media  Maternal Fetal Medicine will have results in approximately 7-10 business days and will call patient or notify via [address]  Patient aware viewing lab result online will reveal fetal sex If ordered  Patient verbalized understanding of all instructions and no questions at this time  Venipuncture Paragraph: An alcohol pad was applied to the venipuncture site. Venipuncture was performed using a butterfly needle. Pressure and a bandaid was applied to the site. No complications were noted. Detail Level: None Bill For Individual Tests Below?: no

## 2023-02-14 NOTE — UTILIZATION REVIEW
NOTIFICATION OF INPATIENT ADMISSION   MATERNITY/DELIVERY AUTHORIZATION REQUEST   SERVICING FACILITY:   [facility] - L&D, [place], NICU  [address]  Tax ID: [id number]  NPI: [id number]   ATTENDING PROVIDER:  Attending Name and NPI#: Monet Gomez [[id number]]  Address: [address] [place] [address]  Phone: [phone]   ADMISSION INFORMATION:  Place of Service: Inpatient [address]  Place of Service Code: 21  Inpatient Admission Date/Time: 2/10/23  3:24 PM  Discharge Date/Time: 2023  5:52 PM  Admitting Diagnosis Code/Description:  Encounter for induction of labor [Z34 90]  Encounter for full-term uncomplicated delivery [N17]     Mother: Harvey Burch 1980 Estimated Date of Delivery: 23  Delivering clinician: Hilario Solis    OB History        3    Para   1    Term   1            AB   2    Living   1       SAB   2    IAB        Ectopic        Multiple   0    Live Births   1           Obstetric Comments   Menarche 16            Name & MRN:   Information for the patient's :  Nataliya Rivas [facility]) [[id number]]      Delivery Information:  Sex: male  Delivered 2023 6:36 PM by Vaginal, Vacuum (Extractor); Gestational Age: 37w0d     Measurements:  Weight: 8 lb 13 5 oz (4010 g); Height: 21"    APGAR 1 minute 5 minutes 10 minutes   Totals: 7 9      [place] Birth Information: 40 y o  female MRN: [id number] Unit/Bed#: -01   Birthweight: No birth weight on file  Gestational Age: <None> Delivery Type:    APGARS Totals:        UTILIZATION REVIEW CONTACT:  Jay Matias Utilization   Network Utilization Review Department  Phone: [phone]  Fax [phone]  Email: [email or web address]  Contact for approvals/pending authorizations, clinical reviews, and discharge       PHYSICIAN ADVISORY SERVICES:  Medical Necessity Denial & Xewg-fz-Iqjd Review  Phone: [phone]  Fax: [phone]  Email: [email or web address]  Umesh Griggs MD  Resident  OB/GYN  Discharge Summary     Attested  Date of Service:  2023 10:43 AM     Attested            Attestation signed by Dalton Wiley DO at  10:54 AM        Standard Teaching Supervising Statement     I have reviewed the note performed and documented by the Resident  I personally performed the required components/examined the patient  I agree with the Resident's findings and plan of care with the following additions/exceptions:      None      Ashlyn Veronica DO                Obstetrics Discharge Summary  Genevieve Ardon 40 y o  female MRN: [id number]  Unit/Bed#: -01 Encounter: [id number]     Admission Date: 2/10/2023      Discharge Date: 23     Admitting Attending: Dr Michael Jaime  Delivery Attending: Dr Michael Jaime  Discharging Attending: Dr Leo Wise     Admitting Diagnoses:   Pregnancy at 39w  IVF pregnancy  AMA  Anxiety     Discharge Diagnoses:   Same, delivered  3rd degree perineal laceration     Procedures: vacuum-assisted vaginal delivery, 3rd degree perineal laceration repair     Anesthesia: epidural     Hospital course: Genevieve Ardon is now a 40 y o  [id number] who was initially admitted at 39w6d for elective induction of labor  A griffith balloon was placed and she was started on pitocin  She received an epidural for analgesia, and an amniotomy was performed for clear fluid  She progressed to complete cervical dilation and began pushing      On 23 she delivered a viable male  40w0d with vacuum assistance  She sustained a 3rd degree laceration during delivery which was adequately repaired  's birth weight was 8lbs 13 5oz; Apgars were 7 (1 min) and 9 (5 min)   was admitted to the  nursery  Patient tolerated the procedure well       Her post-delivery course was uncomplicated   Her postpartum pain was well controlled with oral analgesics  Maternal blood type is O positive so RhoGAM was not indicated      On day of discharge, she was ambulating and able to reasonably perform all ADLs  She was voiding and had appropriate bowel function  Pain was well controlled  She was discharged home on postpartum day #2 without complications   Patient was instructed to follow up with her OBGYN as an outpatient and was given appropriate warnings to call provider if she develops signs of infection or uncontrolled pain      Complications: none apparent     Condition at discharge: good      Provisions for Follow-Up Care:  Please see after visit summary for information related to follow-up care and any pertinent home health orders        Disposition: Home     Planned Readmission: No     Discharge Medications:   Please see AVS for a complete list of discharge medications      Discharge instructions :   Please see AVS for complete discharge instructions      Wayne Hernandez MD  OBGYN Resident  02/13/23  10:43 AM                              Cosigned by: Scott Kim DO at 3/85/0897 10:54 AM     Revision History

## 2023-02-17 ENCOUNTER — OFFICE VISIT (OUTPATIENT)
Dept: POSTPARTUM | Facility: CLINIC | Age: 43
End: 2023-02-17

## 2023-02-17 VITALS — HEART RATE: 68 BPM | DIASTOLIC BLOOD PRESSURE: 70 MMHG | SYSTOLIC BLOOD PRESSURE: 110 MMHG

## 2023-02-17 DIAGNOSIS — Z71.89 ENCOUNTER FOR BREAST FEEDING COUNSELING: Primary | ICD-10-CM

## 2023-02-17 NOTE — PATIENT INSTRUCTIONS
Continue to feed Geovanna on demand at least every 3 hours  Offer the breast to meet early feeding cues when Bg is calm and receptive  Stop the attempt at signs of frustration  When offering the breast work on achieving an optimal latch by positioning baby with ear, shoulder and  hip in line, baby's arms open, not in between mom and baby, nose to nipple, hand at base of head/neck, infant up at chest level and starting to feed infant with nose arriving at the nipple  Then, using areolar compression to achieve a deep latch that is comfortable and exchanges optimum amounts of milk  If Geovanna is not latching or having a full feeding at the breast offer a bottle of expressed milk or donor milk or formula by paced bottle feeding, with a slow flow nipple, refer to the hand out and IABLE video  Continue to pump when Geovanna is not feeding or having a full feeding on the breast   Refer to the hands on pumping video and hand express after pumping  You may cycle the pump from let down mode to expression once milk flow increases, once flow decreases you may go back to let down mode and go though the cycle again, up to 3 times in a pumping session  Sessions should last no longer than 20 min  Consider sizing down flanges  The nipple should move freely in and out of the flange comfortably  Milk should be expressed at least 8-12 times in 24 hours, ideally every 3 hours during the day at least once over night no longer than 5 hours  Keep appointment with pediatrician as scheduled for Monday  Consider and evaluation for Speech Therapy for Geovanna  Consider an appt with Dr Andrea Virk  Follow up in 2 weeks with lactation  Call with any questions or concerns

## 2023-02-17 NOTE — PROGRESS NOTES
INITIAL BREAST FEEDING EVALUATION    Informant/Relationship: Estella/self/Chung's mom  Rosey/Louann wife    Discussion of General Lactation Issues: Sergey Del Cid is not latching, Zehra Bundy reports "very inverted nipple"  initially he would not suck even upon exam in the hospital       Sergey Anger may latch but only takes a few sucks  They had started to use SNS at home, they are no using just a bottle  They are concerned about his weight and jaundice  Pumping is painful for Estella  Infant is 10 days  old today          History:  Fertility Problem:yes - reciprocal IVF, same sex couple   Breast changes:yes - darker nipples  : yes - vacuum, IOL at 40 weeks, over 8lbs estimated, mom's report that Carie Croft was  pushing for 6 5 hours   Full term:yes - 40   labor:no  First nursing/attempt < 1 hour after birth:no, mom's condition did not allow, 3rd degree tear, retained placenta  Skin to skin following delivery:yes - immeialty with rosey, within 2 hours for Estella  Breast changes after delivery:yes - Lidia Palomino and keila day 4, not sure if mature milk is fully in  Rooming in (infant in room with mother with exception of procedures, eg  Circumcision: to nursrey for a few hours  Blood sugar issues:no  NICU stay:no  Jaundice:yes - elevated bilirubin, will be re-checked today and if still elevated will consider phototherapy   Phototherapy:no  Supplement given: (list supplement and method used as well as reason(s):yes - donor milk and formula for not latching, elevated bilirubin    Past Medical History:   Diagnosis Date   • Anxiety    • Varicella     as child         Current Outpatient Medications:   •  docusate sodium (COLACE) 100 mg capsule, Take 1 capsule (100 mg total) by mouth 2 (two) times a day, Disp: , Rfl: 0  •  Fe Bisgly-Vit C-Vit B12-FA (GENTLE IRON PO), Take 25 mg by mouth, Disp: , Rfl:   •  ibuprofen (MOTRIN) 600 mg tablet, Take 1 tablet (600 mg total) by mouth every 6 (six) hours, Disp: 30 tablet, Rfl: 0  •  Prenatal Vit-Fe Fumarate-FA (PRENATAL VITAMIN PO), Take by mouth, Disp: , Rfl:   •  witch hazel-glycerin (TUCKS) topical pad, Apply 1 pad topically every 4 (four) hours as needed for irritation, Disp: , Rfl: 0  •  acetaminophen (TYLENOL) 325 mg tablet, Take 2 tablets (650 mg total) by mouth every 6 (six) hours (Patient not taking: Reported on 2/17/2023), Disp: , Rfl: 0  •  benzocaine-menthol-lanolin-aloe (DERMOPLAST) 20-0 5 % topical spray, Apply 1 application topically every 6 (six) hours as needed for irritation (Patient not taking: Reported on 2/17/2023), Disp: , Rfl: 0  •  hydrocortisone 1 % cream, Apply 1 application topically daily as needed for irritation (Patient not taking: Reported on 2/17/2023), Disp: 30 g, Rfl: 0  •  simethicone (MYLICON) 80 mg chewable tablet, Chew 1 tablet (80 mg total) 4 (four) times a day as needed for flatulence (Patient not taking: Reported on 2/17/2023), Disp: 30 tablet, Rfl: 0    No Known Allergies    Social History     Substance and Sexual Activity   Drug Use Never       Social History     Interval Breastfeeding History:    Frequency of breast feeding: attempts 2-3 times a day usually will latch to the left breast but only for a few seconds  Does mother feel breastfeeding is effective:  If no, explain: not sure how to answer at this time  Does infant appear satisfied after nursing:No  Stooling pattern normal: lIf no, explain: had been low but are increasing with formula supplementation to 3-4 a day  Urinating frequently:Yes  Using shield or shells: No    Alternative/Artificial Feedings:   Bottle: Yes, Specta bottles, slow flow nipples, not paced feeding     Syringe/Finger: not anymore since ped visit yesterday             Formula Type: similac                      Amount: 1 1 5oz             Breast Milk:                      Amount: 1-1 5oz mom's EBM or donor milk     Gives EBM/ donor milk first then formula, for a total of 1 5-2oz each feeding Frequency Q 2-3 Hr between feedings  Elimination Problems: No      Equipment:    Pump            Type: Specta S1            Frequency of Use: q2-3hrs during the day once over night  Yields 1-1 5oz (once 2oz) totals from both breasts       Equipment Problems: no    Mom:  Breast: Normal, small to medium size, round some full glands  Nipple Assessment in General: Normal:  no discoloration and no damage noted, short nipple shaft   Mother's Awareness of Feeding Cues                 Recognizes: Yes                  Verbalizes: Yes  Support System: Wife Robert Alarcon, extended family  History of Breastfeeding: none   Changes/Stressors/Violence: Traumatic delivery   Concerns/Goals: Concerned about Kanes weight and jaundice  Would like to primarily feed at the breast and offer occasional bottles         Physical Exam  Constitutional:       Appearance: Normal appearance  HENT:      Head: Normocephalic and atraumatic  Cardiovascular:      Rate and Rhythm: Normal rate and regular rhythm  Pulses: Normal pulses  Heart sounds: Normal heart sounds  Pulmonary:      Effort: Pulmonary effort is normal       Breath sounds: Normal breath sounds  Musculoskeletal:         General: Swelling present  Normal range of motion  Cervical back: Normal range of motion  Comments: B/L lower extremities     Neurological:      General: No focal deficit present  Mental Status: She is alert and oriented to person, place, and time  Skin:     General: Skin is warm and dry  Psychiatric:         Mood and Affect: Mood normal          Behavior: Behavior normal          Thought Content:  Thought content normal          Judgment: Judgment normal          Infant:  Behaviors: Alert  Color: Jaundice  Birth weight: 4010g  Current weight: 3825g    Problems with infant: possible restricted tongue movement, jaundice      General Appearance:  Alert, active, no distress                             Head:  Normocephalic, AFOF, sutures opposed                             Eyes:  Conjunctiva clear, no drainage                              Ears:  Normally placed, no anomolies                             Nose:  Septum intact, no drainage or erythema                           Mouth:  No lesions                    Neck:  Supple, symmetrical                 Respiratory:  No grunting, flaring, retractions, breath sounds clear and equal            Cardiovascular:  Regular rate and rhythm  No murmur  Adequate perfusion/capillary refill  Femoral pulse present                    Abdomen:   Soft, non-tender, no masses, bowel sounds present, no HSM             Genitourinary:  Normal male,  no discharge, swelling, or pain, anus patent                          Spine:   No abnormalities noted        Musculoskeletal:  Full range of motion          Skin/Hair/Nails:   Skin warm, dry, and intact, rash face, reddened scalp, scabs on scalp                Neurologic:   No abnormal movement, tone appropriate for gestational age    Olena Assessment for Lingual Frenulum Function    Appearance Items Function Items   Appearance of tongue when lifted  1: Slight cleft in tip apparent   Lateralization  1: Body of tongue but not tongue tip   Elasticity of frenulum  1: Moderately elastic   Lift of tongue  0: Tip stays at lower alveloar ridge or rises to mid-mouth only with jaw closure     Length of lingual frenulum when tongue lifted  lingual frenulum length: 0: < 1cm     Extension of tongue  2:  Tip over lower lip   Attachment of lingual frenulum to tongue  2: Posterior to tip   Spread of anterior tongue  2: Complete   Attachment of lingual frenulum to inferior alveolar ridge  1: Attached just below ridge Cupping  2: Entire edge, firm cup   Ankyloglossia Grading:  Class I: mild, 12-16 mm  Class II: moderate, 8-11 mm  Class III: severe, 3-7 mm  ClassIV: complete, less than 3 mm Peristalsis  2: Complete, anterior to posterior       SCORE:    Appearance: 5 (<8=ankyloglossia)  Function: 11 (<11=ankyloglossia) Snapback  2: None       Anderson Latch:  Efficiency:               Lips Flanged: Yes              Depth of latch: shallow              Audible Swallow: No              Visible Milk: No              Wide Open/ Asymmetrical: No              Suck Swallow Cycle: Breathing: unlabored, Coordinated: yes  Nipple Assessment after latch: Normal: elongated/eraser, no discoloration and no damage noted  Latch Problems: Mary Cloud is eager to latch to the breast though he has difficulty achieving and maintain a latch, his latch is shallow and he can only maintain for a few sucks  Position:  Infant's Ergonomics/Body               Body Alignment: Yes               Head Supported: Yes               Close to Mom's body/ Lifted/ Supported: Yes               Mom's Ergonomics/Body: Yes                           Supported: Yes                           Sitting Back: Yes                           Brings Baby to her breast: Yes  Positioning Problems: initially baby is a little high on the breast, corrected with education      Handouts:   Paced bottle feeding, Hands on pumping, Hand expression and Latch Check List    Education:  Reviewed Latch and positioning: Worked on positioning infant up at chest level and starting to feed infant with nose arriving at the nipple  Then, using areolar compression to achieve a deep latch that is comfortable and exchanges optimum amounts of milk  I offered suggestions on positioning, for a more optimal latch, showed mom proper positioning, ear, shoulder hip in line, baby's arms open, not in between mom and baby, nose to nipple, hand at base of head/neck    Reviewed Frequency/Supply & Demand: continue to feed on demand at least every 3 hours,If Mary Cloud is not latching or having a full feeding at the breast offer a bottle of expressed milk or donor milk or formula by paced bottle feeding,  Continue to pump when Mary Cloud is not feeding or having a full feeding on the breast    Reviewed Infant:Cues and varied States of Awareness  Reviewed Infant Elimination: yes  Reviewed Alternative/Artificial Feedings: paced bottle feeding with slow flow nipple   Reviewed Mom/Breast care: hands on pumping, hand expression, sizing down flanges and adjusting settings of pump to decrease pain with pumping  Reviewed Equipment:Refer to the hands on pumping video and hand express after pumping  You may cycle the pump from let down mode to expression once milk flow increases, once flow decreases you may go back to let down mode and go though the cycle again, up to 3 times in a pumping session  Sessions should last no longer than 20 min  Consider sizing down flanges  The nipple should move freely in and out of the flange comfortably  Plan:  Continue to feed Geovanna on demand at least every 3 hours  Offer the breast to meet early feeding cues when Bg is calm and receptive  Stop the attempt at signs of frustration  When offering the breast work on achieving an optimal latch by positioning  If Geovanna is not latching or having a full feeding at the breast offer a bottle of expressed milk or donor milk or formula by paced bottle feeding    Continue to pump when Geovanna is not feeding or having a full feeding on the breast  Milk should be expressed at least 8-12 times in 24 hours, ideally every 3 hours during the day at least once over night no longer than 5 hours  Keep appointment with pediatrician as scheduled for Monday  Consider and evaluation for Speech Therapy for Geovanna  Consider an appt with Dr Valentina Herzog  Follow up in 2 weeks with lactation  I have spent 90 minutes with Patient and family today in which greater than 50% of this time was spent in counseling/coordination of care regarding Patient and family education

## 2023-02-18 LAB — PLACENTA IN STORAGE: NORMAL

## 2023-02-20 ENCOUNTER — OFFICE VISIT (OUTPATIENT)
Dept: FAMILY MEDICINE CLINIC | Facility: CLINIC | Age: 43
End: 2023-02-20

## 2023-02-20 VITALS
HEART RATE: 59 BPM | DIASTOLIC BLOOD PRESSURE: 82 MMHG | HEIGHT: 64 IN | SYSTOLIC BLOOD PRESSURE: 118 MMHG | BODY MASS INDEX: 25.7 KG/M2 | TEMPERATURE: 98.4 F | OXYGEN SATURATION: 98 % | WEIGHT: 150.5 LBS | RESPIRATION RATE: 16 BRPM

## 2023-02-20 DIAGNOSIS — Z12.31 ENCOUNTER FOR SCREENING MAMMOGRAM FOR BREAST CANCER: ICD-10-CM

## 2023-02-20 DIAGNOSIS — Z00.00 ANNUAL PHYSICAL EXAM: Primary | ICD-10-CM

## 2023-02-20 DIAGNOSIS — F32.0 CURRENT MILD EPISODE OF MAJOR DEPRESSIVE DISORDER WITHOUT PRIOR EPISODE (HCC): ICD-10-CM

## 2023-02-20 DIAGNOSIS — E61.1 IRON DEFICIENCY: ICD-10-CM

## 2023-02-20 PROBLEM — O09.813 PREGNANCY RESULTING FROM IN VITRO FERTILIZATION IN THIRD TRIMESTER: Status: RESOLVED | Noted: 2022-08-22 | Resolved: 2023-02-20
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
## 2023-02-26 NOTE — PROGRESS NOTES
I have reviewed the notes, assessments, and/or procedures performed by Mercedes Bernardo RN, IBCLC, I concur with her/his documentation of Kimberly Casey MD 02/26/23

## 2023-03-02 ENCOUNTER — OFFICE VISIT (OUTPATIENT)
Dept: POSTPARTUM | Facility: CLINIC | Age: 43
End: 2023-03-02

## 2023-03-02 DIAGNOSIS — Z71.89 ENCOUNTER FOR BREAST FEEDING COUNSELING: Primary | ICD-10-CM

## 2023-03-02 NOTE — PATIENT INSTRUCTIONS
Nurse on demand: when baby gives hunger cues; when your breasts feel full, or at least every 3 hours counting from the beginning of one feeding to the beginning of the next; which ever comes first  When sucking and swallowing slow, gently compress the breast to restart flow  If active suck-swallow does not restart, gently remove the baby and offer the other breast; offering up to "four" breasts per feeding   Pay close attention to positioning for a deeper latch  Attaching Your Baby at the [employer] is a great resource for practicing effective positioning an determining if your baby is latching and feeding effectively  Feed expressed milk or formula as needed/desired  Paced bottle feeding technique is less stressful for your baby, prevents overfeeding and protects the breastfeeding relationship  You can find a video about paced bottle feeding at www [employer]  org  Continue pumping when a feeding at the breast is missed or if Jhonnie Goldberg does not latch or nurse effectively  When pumping, cycle your pump through stimulation and expression mode several times in a session to stimulate several let downs until you have expressed enough milk to feed the baby and to achieve breast comfort  There is no need to "empty" the breast completely  Use gentle hands on pumping and hand expression   Maintain your pump as recommended  Use flange that fits comfortably and allows the breast to empty effectively  Follow up as scheduled  Please call with any questions or concerns

## 2023-03-02 NOTE — PROGRESS NOTES
BREAST FEEDING FOLLOW UP VISIT    Informant/Relationship: Guanako Huff and Rosey    Discussion of General Lactation Issues: Roger Leonard is latching primarily with a nipple shield  Yesterday for the first time, he latched without a shield  Roger Leonard was evaluated by  and Roger Leonard and Dilma Bhakta were told that he may have a posterior tongue tie  Infant is 3weeks old today  Interval Breastfeeding History:    Frequency of breast feeding: 3 times a day  Does mother feel breastfeeding is effective: No  Does infant appear satisfied after nursing:Yes, but only for about an hour  Stooling pattern normal:Yes  Urinating frequently:Yes  Using shield or shells: Yes     Alternative/Artificial Feedings:   Bottle: Yes, as needed to supplement after nursing and when not feeding at the breast  Cup: No  Syringe/Finger: No           Formula Type: [employer]                     Amount: for one day but not currently            Breast Milk:                      Amount: 2 5 ounces            Frequency Q 3 Hr between feedings  Elimination Problems: No      Equipment:  Nipple Shield             Type: Medela              Size: 24mm             Frequency of Use: at every feeding at the breast  Pump            Type: Spectra S1            Frequency of Use: Every 2-3 hours  Expresses up to 3 ounces per session  Shells            Type: none            Frequency of use: n/a    Equipment Problems: no      Mom:  Breast: Small asymmetrical breasts  R>L  Rounded shape  Closely spaced  Very firm and full  Nipple Assessment in General: Nipples are dimpled but iva easily with stimulation  Mother's Awareness of Feeding Cues                 Recognizes:  Yes                  Verbalizes: Yes  Support System: Dilma Bhakta, extended family  History of Breastfeeding: none  Changes/Stressors/Violence: Guanako Huff is concerned that Roger Leonard does not latch and nurse effectively  Concerns/Goals: Guanako Huff desires to directly breastfeed and have Roger Leonard well fed    Problems with Mom: none    Physical Exam  Constitutional:       Appearance: Normal appearance  HENT:      Head: Normocephalic and atraumatic  Pulmonary:      Effort: Pulmonary effort is normal    Musculoskeletal:         General: Normal range of motion  Cervical back: Normal range of motion and neck supple  Neurological:      Mental Status: She is alert and oriented to person, place, and time  Skin:     General: Skin is warm and dry  Psychiatric:         Mood and Affect: Mood normal          Behavior: Behavior normal          Thought Content: Thought content normal          Judgment: Judgment normal          Infant:  Behaviors: Alert  Color: Pink  Birth weight: 4010gram  Current weight: 4145gram    Problems with infant: not latching or nursing effectively  Recently very fussy at night      General Appearance:  Alert, active, no distress                             Head:  Normocephalic, AFOF, sutures opposed                             Eyes:  Conjunctiva clear, no drainage                              Ears:  Normally placed, no anomolies                             Nose:  no drainage or erythema                           Mouth:  No lesions  Anterior gag reflex  Tongue extends to the lower alveolar ridge, lateralizes well but lift is limited  Good cupping of my finger noted with effective peristalsis most of the tongue  Occasionally, Sofia Villasenoroter applied pressure with his gums as he sucked  Frequent fasciculation of the jaw noted during the exam and while feeding  The lingual frenulum is long, thin, attached posterior to the tip of the tongue and high on the lower alveolar ridge  Neck:  Supple, symmetrical, trachea midline                 Respiratory:  No grunting, flaring, retractions, breath sounds clear and equal            Cardiovascular:  Regular rate and rhythm  No murmur  Adequate perfusion/capillary refill   Femoral pulse present                    Abdomen:   Soft, non-tender, no masses, bowel sounds present, no HSM             Genitourinary:  Normal male, testes descended, no discharge, swelling, or pain, anus patent                          Spine:   No abnormalities noted        Musculoskeletal:  Full range of motion          Skin/Hair/Nails:   Skin warm, dry, and intact, no rashes or abnormal dyspigmentation or lesions                Neurologic:   No abnormal movement, tone appropriate for gestational age     Latch:  Efficiency:               Lips Flanged: lower lip needed to be manually flanged              Depth of latch: good              Audible Swallow: Yes, sustained SSB noted              Visible Milk: Yes              Wide Open/ Asymmetrical: Yes, but could be wider              Suck Swallow Cycle: Breathing: unlabored, Coordinated: yes  Nipple Assessment after latch: Normal: elongated/eraser, no discoloration and no damage noted  Latch Problems: Latrell Clark struggled to latch on the christina, firmer right breast with with just a few attempts was able to latch on the left breast and feed effectively  Troy Pond reported that the feeding was completely comfortable  He was then offered the right breast again but was unable to latch until the nipple shield was placed  He fed well with the shield until he was completely content and Estella's breast was comfortably soft  Position:  Infant's Ergonomics/Body               Body Alignment: Yes               Head Supported: Yes               Close to Mom's body/ Lifted/ Supported: Yes               Mom's Ergonomics/Body: Yes                           Supported: Yes                           Sitting Back: Yes                           Brings Baby to her breast: Yes  Positioning Problems: None   After education, Troy Pond was able to position Latrell Clark independently for an effective latch      Handouts:   Hands On Pumping, Hand Expression    Education:  Reviewed Latch: Demonstrated how to gently compress the breast and align the baby so that his nose is just above the nipple with his lower lip and chin touching the breast to encourage the deepest, widest, off-center latch  Reviewed Positioning for Dyad: Demonstrated how to position baby belly to belly with mom  Reviewed Mom/Breast care: Discussed appropriate handling of the breasts to avoid inflammation and trauma        Plan:   Reassurance and support given  I encouraged Alejandra Portillo to continue to feed Saravanan Certain on demand  We worked on positioning to improve Estella's comfort and confidence and Chung's ability to latch and feed effectively from the breast   I encouraged her to continue to attempt to wean from using the nipple shield  I recommended that she continue to supplement as needed using paced bottle feeding technique  I recommended that Alejandra Corpus continue to pump when a feeding at the breast is missed  Bel[place] Certain will continue to receive support from [facility] and an appointment was scheduled with Dr Kevin Wen for more evaluation and support  A follow up appointment was scheduled with me for additional follow up as well  I encouraged Dariusz Maldonado to call with any questions or concerns  I have spent 90 minutes with Patient and family today in which greater than 50% of this time was spent in counseling/coordination of care regarding Patient and family education

## 2023-03-05 NOTE — PROGRESS NOTES
I have reviewed the notes, assessments, and/or procedures performed by Alexia Overton RN, IBCLC, I concur with her/his documentation of Grecia Cat MD 03/05/23

## 2023-03-11 ENCOUNTER — APPOINTMENT (OUTPATIENT)
Dept: LAB | Facility: CLINIC | Age: 43
End: 2023-03-11

## 2023-03-11 DIAGNOSIS — E61.1 IRON DEFICIENCY: ICD-10-CM

## 2023-03-11 DIAGNOSIS — Z00.00 ANNUAL PHYSICAL EXAM: ICD-10-CM

## 2023-03-11 LAB
ALBUMIN SERPL BCP-MCNC: 4 G/DL (ref 3.5–5)
ALP SERPL-CCNC: 77 U/L (ref 34–104)
ALT SERPL W P-5'-P-CCNC: 19 U/L (ref 7–52)
ANION GAP SERPL CALCULATED.3IONS-SCNC: 7 MMOL/L (ref 4–13)
AST SERPL W P-5'-P-CCNC: 15 U/L (ref 13–39)
BASOPHILS # BLD AUTO: 0.02 THOUSANDS/ÂΜL (ref 0–0.1)
BASOPHILS NFR BLD AUTO: 0 % (ref 0–1)
BILIRUB SERPL-MCNC: 0.49 MG/DL (ref 0.2–1)
BUN SERPL-MCNC: 11 MG/DL (ref 5–25)
CALCIUM SERPL-MCNC: 8.5 MG/DL (ref 8.4–10.2)
CHLORIDE SERPL-SCNC: 106 MMOL/L (ref 96–108)
CHOLEST SERPL-MCNC: 183 MG/DL
CO2 SERPL-SCNC: 27 MMOL/L (ref 21–32)
CREAT SERPL-MCNC: 0.81 MG/DL (ref 0.6–1.3)
EOSINOPHIL # BLD AUTO: 0.2 THOUSAND/ÂΜL (ref 0–0.61)
EOSINOPHIL NFR BLD AUTO: 4 % (ref 0–6)
ERYTHROCYTE [DISTWIDTH] IN BLOOD BY AUTOMATED COUNT: 12.7 % (ref 11.6–15.1)
FERRITIN SERPL-MCNC: 28 NG/ML (ref 8–388)
GFR SERPL CREATININE-BSD FRML MDRD: 90 ML/MIN/1.73SQ M
GLUCOSE P FAST SERPL-MCNC: 86 MG/DL (ref 65–99)
HCT VFR BLD AUTO: 39.2 % (ref 34.8–46.1)
HDLC SERPL-MCNC: 77 MG/DL
HGB BLD-MCNC: 12.7 G/DL (ref 11.5–15.4)
IMM GRANULOCYTES # BLD AUTO: 0.02 THOUSAND/UL (ref 0–0.2)
IMM GRANULOCYTES NFR BLD AUTO: 0 % (ref 0–2)
IRON SATN MFR SERPL: 16 % (ref 15–50)
IRON SERPL-MCNC: 65 UG/DL (ref 50–170)
LDLC SERPL CALC-MCNC: 94 MG/DL (ref 0–100)
LYMPHOCYTES # BLD AUTO: 1.52 THOUSANDS/ÂΜL (ref 0.6–4.47)
LYMPHOCYTES NFR BLD AUTO: 31 % (ref 14–44)
MCH RBC QN AUTO: 30.8 PG (ref 26.8–34.3)
MCHC RBC AUTO-ENTMCNC: 32.4 G/DL (ref 31.4–37.4)
MCV RBC AUTO: 95 FL (ref 82–98)
MONOCYTES # BLD AUTO: 0.4 THOUSAND/ÂΜL (ref 0.17–1.22)
MONOCYTES NFR BLD AUTO: 8 % (ref 4–12)
NEUTROPHILS # BLD AUTO: 2.73 THOUSANDS/ÂΜL (ref 1.85–7.62)
NEUTS SEG NFR BLD AUTO: 57 % (ref 43–75)
NRBC BLD AUTO-RTO: 0 /100 WBCS
PLATELET # BLD AUTO: 264 THOUSANDS/UL (ref 149–390)
PMV BLD AUTO: 10.1 FL (ref 8.9–12.7)
POTASSIUM SERPL-SCNC: 4 MMOL/L (ref 3.5–5.3)
PROT SERPL-MCNC: 6.9 G/DL (ref 6.4–8.4)
RBC # BLD AUTO: 4.12 MILLION/UL (ref 3.81–5.12)
SODIUM SERPL-SCNC: 140 MMOL/L (ref 135–147)
TIBC SERPL-MCNC: 395 UG/DL (ref 250–450)
TRIGL SERPL-MCNC: 61 MG/DL
WBC # BLD AUTO: 4.89 THOUSAND/UL (ref 4.31–10.16)

## 2023-03-16 ENCOUNTER — TELEPHONE (OUTPATIENT)
Dept: OBGYN CLINIC | Facility: CLINIC | Age: 43
End: 2023-03-16

## 2023-03-16 NOTE — TELEPHONE ENCOUNTER
How are you feeling? Are you having any vaginal bleeding? Light spotting   Have you had any problems voiding? No   Have you had any problems moving your bowels? Type of Delivery?    Vaginal delivery - any issues with healing?       Baby's name: Óscar Edwards   How is the baby doing? Baby is doing well   Are you breast/bottle feeding? Breast feeing, formula at times   How is that going? Going well   Have you seen lactation consultant? Yes   Are you having any baby blues? No   Do you have any help at home? Yes        Follow up appointment scheduled :   Patient advised to call the office for abnormal bleeding, mastitis, infection, any signs of postpartum depression  Patient verbalized understanding

## 2023-03-17 ENCOUNTER — OFFICE VISIT (OUTPATIENT)
Dept: POSTPARTUM | Facility: CLINIC | Age: 43
End: 2023-03-17

## 2023-03-17 DIAGNOSIS — R52 PAIN AGGRAVATED BY BREAST FEEDING: ICD-10-CM

## 2023-03-17 DIAGNOSIS — O92.79 PAIN AGGRAVATED BY BREAST FEEDING: ICD-10-CM

## 2023-03-17 NOTE — PROGRESS NOTES
BREAST FEEDING FOLLOW UP VISIT    Informant/Relationship: Estella    Discussion of General Lactation Issues: Flora Jensen feels that breastfeeding has really improved  Her only concern is that she has not been able to express extra milk for use if she is  from [place]  Infant is 2 weeks old today  Interval Breastfeeding History:    Frequency of breast feeding: On demand every 2 5-3 hours during the day  Cluster feeds from 5-12 every night  Sleeps up to 6 hours after that  Does mother feel breastfeeding is effective: Yes  Does infant appear satisfied after nursing:Yes  Stooling pattern normal:Yes  Urinating frequently:Yes  Using shield or shells:No    Alternative/Artificial Feedings:   Bottle: Yes, occasionally  Cup: No  Syringe/Finger: No           Formula Type: Kendemil                     Amount: 4 ounces when expressed milk is not available (only occasionally)            Breast Milk:                      Amount: 3-3 5 ounces             Frequency Q 2 5-6 Hr between feedings  Elimination Problems: No      Equipment:  Nipple Shield             Type: none             Size: n/a             Frequency of Use: n/a  Pump            Type: Spectra S1            Frequency of Use: only occasionally if she is still full after feeding  Shells            Type: none            Frequency of use: n/a    Equipment Problems: no    Mom:  Breast: Small asymmetrical breasts  R>L  Rounded shape  Closely spaced  Nipple Assessment in General: Everted dimpled nipples bilaterally  No visible damage but nipples are very tender  Mother's Awareness of Feeding Cues                 Recognizes:  Yes                  Verbalizes: Yes  Support System: James Lehman, extended family  History of Breastfeeding: none  Changes/Stressors/Violence: Flora Jensen is concerned that Geovanna does not latch and nurse effectively  Concerns/Goals: Flora Jensen desires to directly breastfeed and have Geovanna well fed     Problems with Mom: none      Physical Exam  Constitutional:       Appearance: Normal appearance  HENT:      Head: Normocephalic and atraumatic  Pulmonary:      Effort: Pulmonary effort is normal    Musculoskeletal:         General: Normal range of motion  Cervical back: Normal range of motion and neck supple  Neurological:      Mental Status: She is alert and oriented to person, place, and time  Skin:     General: Skin is warm and dry  Psychiatric:         Mood and Affect: Mood normal          Behavior: Behavior normal          Thought Content: Thought content normal          Judgment: Judgment normal          Infant:  Behaviors: Alert  Color: Pink  Birth weight: 4010gram  Current weight: 4585gram    Problems with infant: shallow latch, biting while feeding    General Appearance:  Alert, active, no distress                             Head:  Normocephalic, AFOF, sutures opposed                             Eyes:  Conjunctiva clear, no drainage                              Ears:  Normally placed, no anomolies                             Nose:  no drainage or erythema                           Mouth:  No lesions  Anterior gag reflex  Tongue extends to the lower alveolar ridge, lateralizes well but lift is limited  Good cupping of my finger noted with effective peristalsis most of the time  The lingual frenulum is long, thin, attached posterior to the tip of the tongue and high on the lower alveolar ridge  Neck:  Supple, symmetrical, trachea midline                 Respiratory:  No grunting, flaring, retractions, breath sounds clear and equal            Cardiovascular:  Regular rate and rhythm  No murmur  Adequate perfusion/capillary refill   Femoral pulse present                    Abdomen:   Soft, non-tender, no masses, bowel sounds present, no HSM             Genitourinary:  Normal male, testes descended, no discharge, swelling, or pain, anus patent                          Spine:   No abnormalities noted Musculoskeletal:  Full range of motion          Skin/Hair/Nails:   Skin warm, dry, and intact, no rashes or abnormal dyspigmentation or lesions                Neurologic:   No abnormal movement, tone appropriate    [place] Latch:  Efficiency:               Lips Flanged: Yes              Depth of latch: deep after repositioning              Audible Swallow: Yes, several sustained suckling bursts noted              Visible Milk: Yes              Wide Open/ Asymmetrical: Yes, after repositioning              Suck Swallow Cycle: Breathing: unlabored, Coordinated: yes  Nipple Assessment after latch: Normal: elongated/eraser, no discoloration and no damage noted  Latch Problems: Estella needed some review of positioning  After repositioning, Ryan Steele was able to latch and feed effectively and Mina Adams was comfortable  He fed on both breasts and was completely content after feeding  Position:  Infant's Ergonomics/Body               Body Alignment: Yes, after repositioning               Head Supported: Yes               Close to Mom's body/ Lifted/ Supported: Yes, after repositioning  Mom's Ergonomics/Body: Yes, after repositioning  Supported: Yes, after repositioning                           Sitting Back: Yes, after repositioning                           Brings Baby to her breast: Yes, after repositioning  Positioning Problems: Initially, Mina Adams leaned over Ryan Steele to bring her breast to his mouth  There was a significant amount of space between their bodies  She positioned her nipple at his mouth rather than his nose  Handouts:   None    Education:  Reviewed Latch: Demonstrated how to gently compress the breast and align the baby so that his nose is just above the nipple with his lower lip and chin touching the breast to encourage the deepest, widest, off-center latch    Reviewed Positioning for Dyad: Demonstrated how to position mom comfortably and supported prior to bringing her baby to her breast  Reviewed Frequency/Supply & Demand: Discussed realistic expectations for milk production      Plan:    Reassurance and support given  I encouraged Yanni Navarro to feed on demand  We worked on positioning to improve her comfort and confidence and Chung's ability to latch effectively  I suggested that Yanni Navarro begin pumping after her first morning feeding as she expresses a desire to begin storing some milk so she can leave Geovanna at times  I suggested that she use a smaller flange to improve comfort  I encouraged paced bottle feeding when feeding expressed milk  We discussed offering a bottle if the demands of very frequent feedings in the late evening are not sustainable for aYnni Austin will continue to receive support from speech therapy and has a follow up appointment with Dr Terrazas Overall  His ability to suck effectively and latch deeply to prevent painful feedings has shown significant progress in the last 2 weeks  I encouraged Yanni Navarro to call with any questions or concerns  I have spent 60 minutes with Patient and family today in which greater than 50% of this time was spent in counseling/coordination of care regarding Patient and family education

## 2023-03-17 NOTE — PATIENT INSTRUCTIONS
Feed Chung on demand  Continue to work on positioning to improve comfort during feedings and Chung's ability to latch and feed effectively at the breast   Use gentle compressions to increase milk flow when he is no longer actively feeding and switch breasts if breast compressions do not restart active feeding  Offer expressed milk as desired using paced bottle feeding technique  Try pumping after feeding once a day to begin to store some milk  Pump if a feeding at the breast is missed as well  When pumping, cycle your pump through stimulation and expression mode several times in a session to stimulate several let downs until you have expressed enough milk to feed the baby and to achieve breast comfort  There is no need to "empty" the breast completely  Use gentle hands on pumping and hand expression   Maintain your pump as recommended  Use flange that fits comfortably and allows the breast to empty effectively  (Try a 17mm flange)  Follow up with speech therapy and Dr Betsy Villarreal as scheduled  Please call with any questions or concerns

## 2023-03-23 NOTE — PROGRESS NOTES
I have reviewed the notes, assessments, and/or procedures performed by Ronald Rios RN, IBCLC, I concur with her/his documentation of Anna Casanova MD 03/23/23

## 2023-04-03 ENCOUNTER — POSTPARTUM VISIT (OUTPATIENT)
Dept: OBGYN CLINIC | Facility: CLINIC | Age: 43
End: 2023-04-03

## 2023-04-03 VITALS
DIASTOLIC BLOOD PRESSURE: 58 MMHG | SYSTOLIC BLOOD PRESSURE: 104 MMHG | BODY MASS INDEX: 25.2 KG/M2 | HEIGHT: 64 IN | WEIGHT: 147.6 LBS

## 2023-04-03 PROBLEM — O09.523 AMA (ADVANCED MATERNAL AGE) MULTIGRAVIDA 35+, THIRD TRIMESTER: Status: RESOLVED | Noted: 2022-08-07 | Resolved: 2023-04-03

## 2023-04-03 NOTE — PROGRESS NOTES
"Assessment/Plan     Normal postpartum exam      1  Contraception: none  2  Annual exam due in July  3  Lactation consult, [address] information discussed  4  Increase activity as tolerated, may resume all normal activity at 6 weeks postpartum  5  Anticipated return to work: 6 - 12 weeks post partum  Esteban Polk is a 40 y o  female who presents for a postpartum visit  She is 6 weeks postpartum following a vacuum, outlet  I have fully reviewed the prenatal and intrapartum course  The delivery was at 40 gestational weeks  Anesthesia: epidural  Laceration: 3rd degree  Bleeding no bleeding  Bowel function is normal - some difficulty with continence of gas  Bladder function - mild incontinence  Patient has not been sexually active  Desired contraception method is none  Postpartum depression screening: negative  EPDS : 0    Baby's course has been uneventful  Baby is feeding by breast     Last Pap : 2020 ; no abnormalities  Gestational Diabetes: no  Pregnancy Complications: none    The following portions of the patient's history were reviewed and updated as appropriate: allergies, current medications, past family history, past medical history, past social history, past surgical history and problem list       Current Outpatient Medications:   •  Prenatal Vit-Fe Fumarate-FA (PRENATAL VITAMIN PO), Take by mouth, Disp: , Rfl:     No Known Allergies    Review of Systems  Constitutional: no fever, feels well  Breasts: no complaints of breast pain, breast lump, or nipple discharge  Gastrointestinal: no complaints nausea, vomiting  Genitourinary: as noted in HPI    Neurological: no complaints of headache      Objective      /58 (BP Location: Right arm, Patient Position: Sitting, Cuff Size: Standard)   Ht 5' 4 3\" (1 633 m)   Wt 67 kg (147 lb 9 6 oz)   LMP 05/06/2022 Comment: denies preg  Breastfeeding Yes   BMI 25 10 kg/m²     OBGyn Exam  General: alert and oriented, in no " acute distress  Vulva: normal  Vagina: normal mucosa, perineal scar tissue

## 2023-06-14 ENCOUNTER — APPOINTMENT (OUTPATIENT)
Dept: LAB | Facility: CLINIC | Age: 43
End: 2023-06-14
Payer: COMMERCIAL

## 2023-06-14 DIAGNOSIS — Z00.00 ENCOUNTER FOR GENERAL HEALTH EXAMINATION: ICD-10-CM

## 2023-06-14 LAB
CHOLEST SERPL-MCNC: 153 MG/DL
EST. AVERAGE GLUCOSE BLD GHB EST-MCNC: 108 MG/DL
HBA1C MFR BLD: 5.4 %
HDLC SERPL-MCNC: 74 MG/DL
LDLC SERPL CALC-MCNC: 71 MG/DL (ref 0–100)
NONHDLC SERPL-MCNC: 79 MG/DL
TRIGL SERPL-MCNC: 42 MG/DL

## 2023-06-14 PROCEDURE — 36415 COLL VENOUS BLD VENIPUNCTURE: CPT

## 2023-06-14 PROCEDURE — 83036 HEMOGLOBIN GLYCOSYLATED A1C: CPT

## 2023-06-14 PROCEDURE — 80061 LIPID PANEL: CPT

## 2023-08-11 ENCOUNTER — TELEPHONE (OUTPATIENT)
Dept: POSTPARTUM | Facility: CLINIC | Age: 43
End: 2023-08-11

## 2023-08-11 NOTE — TELEPHONE ENCOUNTER
Chandni Castro is concerned that when she pumps, she is only expressing "foremilk" and when Zeferino Garcia is fed this milk he is very gassy and fussy (per her wife who is feeding him the expressed milk and donor milk). He does not at this way after directly breastfeeding or when he is fed formula. She does not feel as "empty" after pumping as she does after nursing Zeferino Garcia. She is using a Spectra S1 every 3-4 hours while at work. She is expressing 2-5.5 ounces per session. She is pumping for 15 minutes because someone told her to never pump longer than that. Zeferino Garcia is taking 6 ounces per feeding (breastmilk, donor breastmilk and formula) every 3 hours or so. I reviewed with Chandni Castro that her milk is uniquely made for Zeferino Garcia and that her milk will not look the same as donor milk or anyone else's milk. I reassured her that her milk is just what he needs. I encouraged Chandni Castro to speak with her wife to more information about her concerns with Chung's behavior when she is caring for him. We discussed that his behavior may actually be "normal" infant behavior that her wife perceives as "fussy". I also suggested that the donor milk may possibly be the source of his discomfort as well. I suggested that when pumping, Estella pump until her breasts are comfortable, rather than timing the sessions. I reviewed that prolonged pumping sessions are not desirable but potentially she may need a little more than 15 minutes to "get the job done". I encouraged her to call with any questions or concerns.

## 2023-08-11 NOTE — TELEPHONE ENCOUNTER
Kevin Finn called - would like to speak with you regarding her pump - asked for a phone call to discuss.     Gwen Duty is 6m

## 2023-10-26 NOTE — PROGRESS NOTES
Detail Level: Detailed Patient states she is feeling well, no complaints  Does have questions when she should stop taking the Aspirin  Declines Flu shot      Urine neg/neg Add 46291 Cpt? (Important Note: In 2017 The Use Of 22054 Is Being Tracked By Cms To Determine Future Global Period Reimbursement For Global Periods): yes Wound Evaluated By: Dr. Franklin Sloan Additional Comments: Patient presents two months post Artacent placement.  Wound has continued to granulate fully - completely covering the underlying cartilage (some white soft stellate scarring remains). Reviewed option for thin FTSG placement over stellate scarring to provide additional volume and a more approximate skin match - patient is please with result at this time. Instructed patient to continue placing Vaseline on site until completely healed.  Instructed patient to apply sunscreen liberally.  Patient will follow up as needed.

## 2024-02-26 ENCOUNTER — OFFICE VISIT (OUTPATIENT)
Dept: FAMILY MEDICINE CLINIC | Facility: CLINIC | Age: 44
End: 2024-02-26
Payer: COMMERCIAL

## 2024-02-26 VITALS
BODY MASS INDEX: 23.56 KG/M2 | DIASTOLIC BLOOD PRESSURE: 70 MMHG | OXYGEN SATURATION: 99 % | HEIGHT: 64 IN | RESPIRATION RATE: 16 BRPM | SYSTOLIC BLOOD PRESSURE: 110 MMHG | WEIGHT: 138 LBS | TEMPERATURE: 98.7 F | HEART RATE: 90 BPM

## 2024-02-26 DIAGNOSIS — F32.0 CURRENT MILD EPISODE OF MAJOR DEPRESSIVE DISORDER WITHOUT PRIOR EPISODE (HCC): ICD-10-CM

## 2024-02-26 DIAGNOSIS — Z12.31 ENCOUNTER FOR SCREENING MAMMOGRAM FOR BREAST CANCER: ICD-10-CM

## 2024-02-26 DIAGNOSIS — Z00.00 ANNUAL PHYSICAL EXAM: Primary | ICD-10-CM

## 2024-02-26 DIAGNOSIS — L30.8 OTHER ECZEMA: ICD-10-CM

## 2024-02-26 PROCEDURE — 99396 PREV VISIT EST AGE 40-64: CPT | Performed by: FAMILY MEDICINE

## 2024-02-26 RX ORDER — BETAMETHASONE DIPROPIONATE 0.5 MG/G
CREAM TOPICAL 2 TIMES DAILY
Qty: 15 G | Refills: 0 | Status: SHIPPED | OUTPATIENT
Start: 2024-02-26
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
## 2024-04-16 ENCOUNTER — INITIAL PRENATAL (OUTPATIENT)
Dept: OBGYN CLINIC | Facility: CLINIC | Age: 44
End: 2024-04-16

## 2024-04-16 VITALS
HEIGHT: 64 IN | BODY MASS INDEX: 23.05 KG/M2 | HEART RATE: 66 BPM | OXYGEN SATURATION: 99 % | DIASTOLIC BLOOD PRESSURE: 64 MMHG | RESPIRATION RATE: 16 BRPM | SYSTOLIC BLOOD PRESSURE: 96 MMHG | WEIGHT: 135 LBS

## 2024-04-16 DIAGNOSIS — Z3A.10 10 WEEKS GESTATION OF PREGNANCY: ICD-10-CM

## 2024-04-16 DIAGNOSIS — O09.819 PREGNANCY RESULTING FROM IN VITRO FERTILIZATION, ANTEPARTUM: Primary | ICD-10-CM

## 2024-04-16 PROCEDURE — OBC

## 2024-04-16 NOTE — PATIENT INSTRUCTIONS
Congratulations!! Please review our Pregnancy Essential Guide and [facility] Virtual tour from our networks website.     [facility] Pregnancy Essentials Guide  [facility] ([email or web address])     [facility] ([employer])

## 2024-04-16 NOTE — PROGRESS NOTES
OB INTAKE INTERVIEW  Patient is 43 y.o.y.o. who presents for OB intake at 10wks 2days  Her wife (Rosey) is involved in the pregnancy.    Details that I feel the provider should be aware of:   Estella is a pleasant 43 year old . Her pregnancy was achieved via IVF/ICSI with [facility]. The egg was from her wife from age 36years and they used the same sperm donor that was used to conceive their son. 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 Estella has a a history of a VAVD after pushing close to 6hr. She offered no concerns during today's intake. Reviewed next appointment and what to expect.      Last Menstrual Period: 2024  Ultrasound: Measured 7 weeks 6 days on 2024  IVF/ICSI done on 2024  Estimated Date of Delivery: 11/10/2024 confirmed by US    PLAN-  -Prenatal labs ordered  -Referral placed for MFM  -Reviewed Genetic Testing options   -Had PGT testing with [facility]  -Patient to contact the office with any concerns  -RTO for OB visit with pap and cultures    Signs/Symptoms of Pregnancy  Current pregnancy symptoms:   Nausea NO  Vomiting NO  Breast tenderness NO  Fatigue YES  Constipation YES, reviewed increasing fluids, apple juice, prune juice, Colace, and OTC medication  Headaches no  Cramping/spotting no  PICA cravings no    Diabetes-  Body mass index is 23.17 kg/m².  If patient has 1 or more, please order early 1 hour GTT  History of GDM no  BMI >35 no  History of PCOS or current metformin use no  History of LGA/macrosomic infant (4000g/9lbs) no    If patient has 2 or more, please order early 1 hour GTT  BMI>30 no  AMA YES  First degree relative with type 2 diabetes no  History of chronic HTN, hyperlipidemia, elevated A1C no  High risk race (, , ,  or ) no    Hypertension- if you answer yes to any of the following, please order baseline preeclampsia labs (cbc, comprehensive metabolic panel, urine protein  creatinine ratio, uric acid)  History of of chronic HTN no  History of gestational HTN no  History of preeclampsia, eclampsia, or HELLP syndrome no  History of diabetes no  History of lupus, autoimmune disease, kidney disease no    Thyroid- if yes order TSH with reflex T4  History of thyroid disease no    Bleeding Disorder or Hx of DVT-patient or first degree relative with history of. Order the following if not done previously.   (Factor V, antithrombin III, prothrombin gene mutation, protein C and S Ag, lupus anticoagulant, anticardiolipin, beta-2 glycoprotein)   no    OB/GYN-  History of abnormal pap smear no       Date of last pap smear 2020  History of HPV no  History of Herpes/HSV no  History of other STI (gonorrhea, chlamydia, trich) no  History of prior  YES  History of prior  no  History of  delivery prior to 36 weeks 6 days no  History of blood transfusion no  Ok for blood transfusion YES    Substance screening-   History of tobacco use YES  Currently using tobacco no  Substance Use Screen Level (N/A, LOW, HIGH) NO Risk    MRSA Screening-   Does the pt have a hx of MRSA? no    Immunizations:  Discussed Tdap vaccine YES  Discussed COVID Vaccine YES    Genetic/MFM-  Do you or your partner have a history of any of the following in yourselves or first degree relatives?  Cystic fibrosis no  Spinal muscular atrophy no  Hemoglobinopathy/Sickle Cell/Thalassemia no  Fragile X Intellectual Disability no    If yes, discuss Carrier Screening and recommend consultation with M/Genetic Counseling and place specific [facility] Referral for.    If no, discuss Carrier Screening being completed once in a lifetime as a standard of care lab test. Place orders for Cystic Fibrosis Gene Test (MFM818) and Spinal Muscular Atrophy DNA ([id number])      Appointment for Nuchal Translucency Ultrasound at [facility] not scheduled at this time. Referral placed.      Interview education  [facility] Pregnancy Essentials Book  reviewed, discussed and attached to their AVS YES    Nurse/Family Partnership- patient may qualify NO; referral placed NO    Prenatal lab work scripts YES  Extra labs ordered:  none    Aspirin/Preeclampsia Screen    Risk Level Risk Factor Recommendation   LOW Prior Uncomplicated full-term delivery YES No Aspirin recommendation        MODERATE Nulliparity no Recommend low-dose aspirin if     BMI>30 no 2 or more moderate risk factors    Family History Preeclampsia (mother/sister) no     35yr old or greater YES      or Low Socioeconomic no     IVF Pregnancy  YES     Personal History Risks (low birth weight, prior adverse preg outcome, >10yr preg interval) no         HIGH History of Preeclampsia no Recommend low-dose aspirin if     Multifetal gestation no 1 or more high risk factors    Chronic HTN no     Type 1 or 2 Diabetes no     Renal Disease no     Autoimmune Disease  no      Contraindications to ASA therapy:  NSAID/ ASA allergy: no  Nasal polyps: no  Asthma with history of ASA induced bronchospasm: no  Relative contraindications:  History of GI bleed: no  Active peptic ulcer disease: no  Severe hepatic dysfunction: no    Patient should be recommended to take ASA 162mg during this pregnancy from 12-36wks to lower her risk of preeclampsia: Reviewed benefits of ASA therapy with Estella. She was on therapy with her prior pregnancy. Aware that she should start taking 2-81mg aspirin at 12 weeks.     PN1 visit scheduled. The patient was oriented to our practice, the navigator role, reviewed delivering physicians and the [facility] for Delivery. All questions were answered.    Interviewed by: Marnie Muro RN

## 2024-04-29 ENCOUNTER — INITIAL PRENATAL (OUTPATIENT)
Dept: OBGYN CLINIC | Facility: CLINIC | Age: 44
End: 2024-04-29

## 2024-04-29 ENCOUNTER — LAB (OUTPATIENT)
Dept: LAB | Facility: CLINIC | Age: 44
End: 2024-04-29
Payer: COMMERCIAL

## 2024-04-29 VITALS — SYSTOLIC BLOOD PRESSURE: 106 MMHG | BODY MASS INDEX: 23.24 KG/M2 | WEIGHT: 135.4 LBS | DIASTOLIC BLOOD PRESSURE: 62 MMHG

## 2024-04-29 DIAGNOSIS — O09.819 PREGNANCY RESULTING FROM IN VITRO FERTILIZATION, ANTEPARTUM: ICD-10-CM

## 2024-04-29 DIAGNOSIS — O09.521 MULTIGRAVIDA OF ADVANCED MATERNAL AGE IN FIRST TRIMESTER: ICD-10-CM

## 2024-04-29 DIAGNOSIS — Z3A.12 12 WEEKS GESTATION OF PREGNANCY: ICD-10-CM

## 2024-04-29 DIAGNOSIS — Z3A.10 10 WEEKS GESTATION OF PREGNANCY: ICD-10-CM

## 2024-04-29 DIAGNOSIS — O09.811 PREGNANCY RESULTING FROM IN VITRO FERTILIZATION IN FIRST TRIMESTER: Primary | ICD-10-CM

## 2024-04-29 PROBLEM — O09.899 SHORT INTERVAL BETWEEN PREGNANCIES AFFECTING PREGNANCY, ANTEPARTUM: Status: ACTIVE | Noted: 2024-04-29

## 2024-04-29 LAB
ABO GROUP BLD: NORMAL
BASOPHILS # BLD AUTO: 0.01 THOUSANDS/ÂΜL (ref 0–0.1)
BASOPHILS NFR BLD AUTO: 0 % (ref 0–1)
BLD GP AB SCN SERPL QL: NEGATIVE
EOSINOPHIL # BLD AUTO: 0.04 THOUSAND/ÂΜL (ref 0–0.61)
EOSINOPHIL NFR BLD AUTO: 1 % (ref 0–6)
ERYTHROCYTE [DISTWIDTH] IN BLOOD BY AUTOMATED COUNT: 12.4 % (ref 11.6–15.1)
HBV SURFACE AG SER QL: NORMAL
HCT VFR BLD AUTO: 39.3 % (ref 34.8–46.1)
HCV AB SER QL: NORMAL
HGB BLD-MCNC: 12.9 G/DL (ref 11.5–15.4)
IMM GRANULOCYTES # BLD AUTO: 0.02 THOUSAND/UL (ref 0–0.2)
IMM GRANULOCYTES NFR BLD AUTO: 0 % (ref 0–2)
LYMPHOCYTES # BLD AUTO: 1.47 THOUSANDS/ÂΜL (ref 0.6–4.47)
LYMPHOCYTES NFR BLD AUTO: 21 % (ref 14–44)
MCH RBC QN AUTO: 30.9 PG (ref 26.8–34.3)
MCHC RBC AUTO-ENTMCNC: 32.8 G/DL (ref 31.4–37.4)
MCV RBC AUTO: 94 FL (ref 82–98)
MONOCYTES # BLD AUTO: 0.43 THOUSAND/ÂΜL (ref 0.17–1.22)
MONOCYTES NFR BLD AUTO: 6 % (ref 4–12)
NEUTROPHILS # BLD AUTO: 4.91 THOUSANDS/ÂΜL (ref 1.85–7.62)
NEUTS SEG NFR BLD AUTO: 72 % (ref 43–75)
NRBC BLD AUTO-RTO: 0 /100 WBCS
PLATELET # BLD AUTO: 266 THOUSANDS/UL (ref 149–390)
PMV BLD AUTO: 10.2 FL (ref 8.9–12.7)
RBC # BLD AUTO: 4.18 MILLION/UL (ref 3.81–5.12)
RH BLD: POSITIVE
RUBV IGG SERPL IA-ACNC: 17.4 IU/ML
SPECIMEN EXPIRATION DATE: NORMAL
TREPONEMA PALLIDUM IGG+IGM AB [PRESENCE] IN SERUM OR PLASMA BY IMMUNOASSAY: NORMAL
VZV IGG SER QL IA: NORMAL
WBC # BLD AUTO: 6.88 THOUSAND/UL (ref 4.31–10.16)

## 2024-04-29 PROCEDURE — 86850 RBC ANTIBODY SCREEN: CPT

## 2024-04-29 PROCEDURE — PNV

## 2024-04-29 PROCEDURE — 86780 TREPONEMA PALLIDUM: CPT

## 2024-04-29 PROCEDURE — 86803 HEPATITIS C AB TEST: CPT

## 2024-04-29 PROCEDURE — 87340 HEPATITIS B SURFACE AG IA: CPT

## 2024-04-29 PROCEDURE — 87389 HIV-1 AG W/HIV-1&-2 AB AG IA: CPT

## 2024-04-29 PROCEDURE — 87491 CHLMYD TRACH DNA AMP PROBE: CPT

## 2024-04-29 PROCEDURE — 87591 N.GONORRHOEAE DNA AMP PROB: CPT

## 2024-04-29 PROCEDURE — G0476 HPV COMBO ASSAY CA SCREEN: HCPCS

## 2024-04-29 PROCEDURE — 86900 BLOOD TYPING SEROLOGIC ABO: CPT

## 2024-04-29 PROCEDURE — 36415 COLL VENOUS BLD VENIPUNCTURE: CPT

## 2024-04-29 PROCEDURE — 86787 VARICELLA-ZOSTER ANTIBODY: CPT

## 2024-04-29 PROCEDURE — 86762 RUBELLA ANTIBODY: CPT

## 2024-04-29 PROCEDURE — G0145 SCR C/V CYTO,THINLAYER,RESCR: HCPCS

## 2024-04-29 PROCEDURE — 86901 BLOOD TYPING SEROLOGIC RH(D): CPT

## 2024-04-29 PROCEDURE — 85025 COMPLETE CBC W/AUTO DIFF WBC: CPT

## 2024-04-29 NOTE — PROGRESS NOTES
Patient is a 43 YO  female presenting to the office at 12w1d for routine OB care.   Patient is feeling well today.   Initial PN labs incomplete, encouraged to complete.   Fetal heart rate: 160  BP: 106/62  TWG: -2lb 9.6oz  Fetal Movement: not feeling yet     Cramping: no  Bleeding: no  LOF: no  NT/13 week scan scheduled: yes, tomorrow 24  Anatomy scan scheduled: no, will schedule after NT  AFP ordered if indicated: yes  Prenatal labs complete (including Heb B, HIV): no  Pap collected: yes  GC collected:yes  Oriented to practice/delivery location.   RTO 4 weeks

## 2024-04-30 ENCOUNTER — ROUTINE PRENATAL (OUTPATIENT)
Facility: HOSPITAL | Age: 44
End: 2024-04-30
Attending: OBSTETRICS & GYNECOLOGY
Payer: COMMERCIAL

## 2024-04-30 VITALS
HEIGHT: 64 IN | SYSTOLIC BLOOD PRESSURE: 110 MMHG | WEIGHT: 135.2 LBS | DIASTOLIC BLOOD PRESSURE: 60 MMHG | OXYGEN SATURATION: 99 % | BODY MASS INDEX: 23.08 KG/M2 | HEART RATE: 76 BPM

## 2024-04-30 DIAGNOSIS — Z36.9 UNSPECIFIED ANTENATAL SCREENING: ICD-10-CM

## 2024-04-30 DIAGNOSIS — Z3A.10 10 WEEKS GESTATION OF PREGNANCY: ICD-10-CM

## 2024-04-30 DIAGNOSIS — Z3A.12 12 WEEKS GESTATION OF PREGNANCY: ICD-10-CM

## 2024-04-30 DIAGNOSIS — O09.819 PREGNANCY RESULTING FROM IN VITRO FERTILIZATION, ANTEPARTUM: ICD-10-CM

## 2024-04-30 DIAGNOSIS — O09.899 SHORT INTERVAL BETWEEN PREGNANCIES AFFECTING PREGNANCY, ANTEPARTUM: Primary | ICD-10-CM

## 2024-04-30 DIAGNOSIS — O09.811 PREGNANCY RESULTING FROM IN VITRO FERTILIZATION IN FIRST TRIMESTER: ICD-10-CM

## 2024-04-30 DIAGNOSIS — Z36.82 ENCOUNTER FOR (NT) NUCHAL TRANSLUCENCY SCAN: ICD-10-CM

## 2024-04-30 DIAGNOSIS — O09.521 MULTIGRAVIDA OF ADVANCED MATERNAL AGE IN FIRST TRIMESTER: ICD-10-CM

## 2024-04-30 DIAGNOSIS — Z33.1 PREGNANT STATE, INCIDENTAL: ICD-10-CM

## 2024-04-30 LAB
HIV 1+2 AB+HIV1 P24 AG SERPL QL IA: NORMAL
HIV 2 AB SERPL QL IA: NORMAL
HIV1 AB SERPL QL IA: NORMAL
HIV1 P24 AG SERPL QL IA: NORMAL
HPV HR 12 DNA CVX QL NAA+PROBE: NEGATIVE
HPV16 DNA CVX QL NAA+PROBE: NEGATIVE
HPV18 DNA CVX QL NAA+PROBE: NEGATIVE

## 2024-04-30 PROCEDURE — 76801 OB US < 14 WKS SINGLE FETUS: CPT | Performed by: OBSTETRICS & GYNECOLOGY

## 2024-04-30 PROCEDURE — 76813 OB US NUCHAL MEAS 1 GEST: CPT | Performed by: OBSTETRICS & GYNECOLOGY

## 2024-04-30 PROCEDURE — 99243 OFF/OP CNSLTJ NEW/EST LOW 30: CPT | Performed by: OBSTETRICS & GYNECOLOGY

## 2024-04-30 PROCEDURE — 36415 COLL VENOUS BLD VENIPUNCTURE: CPT | Performed by: OBSTETRICS & GYNECOLOGY

## 2024-04-30 RX ORDER — ASPIRIN 81 MG/1
162 TABLET, CHEWABLE ORAL DAILY
Qty: 180 TABLET | Refills: 2 | Status: SHIPPED | OUTPATIENT
Start: 2024-04-30

## 2024-04-30 NOTE — LETTER
"2024    Ashlyn Capellan, DO  [address]  [address]  [place] [address]    Patient: Becca Styles   YOB: 1980   Date of Visit: 2024   Gestational age 12w2d   Nature of this communication: Routine; normal NT       Dear Dr aCpellan,    This patient was seen recently in our  office.  The content of my evaluation today is in the ultrasound report under \"OB Procedures\" tab.     Please don't hesitate to contact our office with any concerns or questions.     Sincerely,      Dilcia Andrade MD  Attending Physician, Maternal-Fetal Medicine  [facility]      "

## 2024-04-30 NOTE — PROGRESS NOTES
"[facility]: Becca Styles was seen today for nuchal translucency ultrasound.  See ultrasound report under \"OB Procedures\" tab.      MDM:   I. Diagnoses/Problems addressed:  IVF, AMA, prior 3rd degree  II.  Data: I ordered the following tests: NIPS, I reviewed prior delivery note, I reviewed initial prenatal.  III.  Risk of morbidity: Moderate (asa)    Please don't hesitate to contact our office with any concerns or questions.  -Dilcia Andrade MD    "

## 2024-04-30 NOTE — PROGRESS NOTES
Patient chose to have [facility] [id number] Non-Invasive Prenatal Screen [id number] PLUS w/ SCA, WITH fetal sex.  Patient choose to be billed through insurance.     Patient given brochure and is aware [facility] will contact patient's insurance and coordinate coverage.  Provided [facility] contact information. General inquiries [phone], Cost estimates [phone] and [facility] Billing [phone]. Website [email or web address].     Blood collection tubes labeled with patient identifiers (name, medical record number, and date of birth).     Filled out [facility] order form. Patient chose to have blood drawn in our office at time of visit. NIPS was drawn from left arm with a straight needle by BEN KIMBALL. .      If patient chose to have blood work drawn at a [facility] lab we requested patient notify MFM (via phone call or Meta[employer] message) when blood collected so office can follow up on results.       Maternal Fetal Medicine will have results in approximately 5-7 business days and will call patient or notify via Meta[employer].  Patient aware viewing lab result online will reveal fetal sex if ordered.    Patient verbalized understanding of all instructions and no questions at this time.

## 2024-05-01 LAB
C TRACH DNA SPEC QL NAA+PROBE: NEGATIVE
N GONORRHOEA DNA SPEC QL NAA+PROBE: NEGATIVE

## 2024-05-04 LAB
CFDNA.FET/CFDNA.TOTAL SFR FETUS: NORMAL %
CITATION REF LAB TEST: NORMAL
FET 13+18+21+X+Y ANEUP PLAS.CFDNA: NEGATIVE
FET CHR 21 TS PLAS.CFDNA QL: NEGATIVE
FET CHR 21 TS PLAS.CFDNA QL: NEGATIVE
FET MS X RISK WBC.DNA+CFDNA QL: NOT DETECTED
FET SEX PLAS.CFDNA DOSAGE CFDNA: NORMAL
FET TS 13 RISK PLAS.CFDNA QL: NEGATIVE
FET X + Y ANEUP RISK PLAS.CFDNA SEQ-IMP: NOT DETECTED
GA EST FROM CONCEPTION DATE: NORMAL D
GESTATIONAL AGE > 9:: YES
LAB AP GYN PRIMARY INTERPRETATION: NORMAL
LAB AP LMP: NORMAL
LAB DIRECTOR NAME PROVIDER: NORMAL
LAB DIRECTOR NAME PROVIDER: NORMAL
LABORATORY COMMENT REPORT: NORMAL
LIMITATIONS OF THE TEST: NORMAL
Lab: NORMAL
NEGATIVE PREDICTIVE VALUE: NORMAL
PERFORMANCE CHARACTERISTICS: NORMAL
POSITIVE PREDICTIVE VALUE: NORMAL
REF LAB TEST METHOD: NORMAL
SL AMB NOTE:: NORMAL
TEST PERFORMANCE INFO SPEC: NORMAL

## 2024-06-03 ENCOUNTER — TELEPHONE (OUTPATIENT)
Dept: OBGYN CLINIC | Facility: CLINIC | Age: 44
End: 2024-06-03

## 2024-06-03 NOTE — TELEPHONE ENCOUNTER
Placed call to the patient- she hasn't been seen since the end of April and has no further appointments scheduled in our office. Left a detailed voicemail to schedule an appointment / also let her know that I am going to be sending her a [employer] message to check in on her / make sure she is doing okay and if she needs anything, to please reach out.

## 2024-06-11 ENCOUNTER — ROUTINE PRENATAL (OUTPATIENT)
Dept: OBGYN CLINIC | Facility: CLINIC | Age: 44
End: 2024-06-11

## 2024-06-11 ENCOUNTER — LAB (OUTPATIENT)
Dept: LAB | Facility: CLINIC | Age: 44
End: 2024-06-11
Payer: COMMERCIAL

## 2024-06-11 VITALS — BODY MASS INDEX: 24.2 KG/M2 | WEIGHT: 141 LBS | SYSTOLIC BLOOD PRESSURE: 114 MMHG | DIASTOLIC BLOOD PRESSURE: 66 MMHG

## 2024-06-11 DIAGNOSIS — Z3A.12 12 WEEKS GESTATION OF PREGNANCY: ICD-10-CM

## 2024-06-11 DIAGNOSIS — O09.522 MULTIGRAVIDA OF ADVANCED MATERNAL AGE IN SECOND TRIMESTER: ICD-10-CM

## 2024-06-11 DIAGNOSIS — Z3A.18 18 WEEKS GESTATION OF PREGNANCY: ICD-10-CM

## 2024-06-11 DIAGNOSIS — O09.811 PREGNANCY RESULTING FROM IN VITRO FERTILIZATION IN FIRST TRIMESTER: ICD-10-CM

## 2024-06-11 DIAGNOSIS — O09.899 SHORT INTERVAL BETWEEN PREGNANCIES AFFECTING PREGNANCY, ANTEPARTUM: Primary | ICD-10-CM

## 2024-06-11 DIAGNOSIS — O09.521 MULTIGRAVIDA OF ADVANCED MATERNAL AGE IN FIRST TRIMESTER: ICD-10-CM

## 2024-06-11 PROCEDURE — 36415 COLL VENOUS BLD VENIPUNCTURE: CPT

## 2024-06-11 PROCEDURE — PNV: Performed by: OBSTETRICS & GYNECOLOGY

## 2024-06-11 PROCEDURE — 87086 URINE CULTURE/COLONY COUNT: CPT

## 2024-06-11 PROCEDURE — 82105 ALPHA-FETOPROTEIN SERUM: CPT

## 2024-06-11 NOTE — PROGRESS NOTES
44 y.o.   female at 18.2 wga for PNV. BP : 114/66. TWG: 3  + FM, - LOF, - Ctxn, - bleeding  Feeling well. No complaints  Reminded pt to do AFP   Level 2 US scheduled  Discussed delivery options - would like a trial of labor, recommended IOL at 39 weeks  F/u 4 weeks

## 2024-06-13 LAB
2ND TRIMESTER 4 SCREEN SERPL-IMP: NORMAL
AFP ADJ MOM SERPL: 0.94
AFP INTERP AMN-IMP: NORMAL
AFP INTERP SERPL-IMP: NORMAL
AFP INTERP SERPL-IMP: NORMAL
AFP SERPL-MCNC: 46.7 NG/ML
AGE AT DELIVERY: 43.5 YR
BACTERIA UR CULT: ABNORMAL
GA METHOD: NORMAL
GA: 18.3 WEEKS
IDDM PATIENT QL: NO
MULTIPLE PREGNANCY: NO
NEURAL TUBE DEFECT RISK FETUS: NORMAL %

## 2024-06-25 ENCOUNTER — ROUTINE PRENATAL (OUTPATIENT)
Facility: HOSPITAL | Age: 44
End: 2024-06-25
Payer: COMMERCIAL

## 2024-06-25 VITALS
SYSTOLIC BLOOD PRESSURE: 118 MMHG | DIASTOLIC BLOOD PRESSURE: 64 MMHG | BODY MASS INDEX: 24.17 KG/M2 | HEIGHT: 64 IN | WEIGHT: 141.6 LBS | HEART RATE: 63 BPM

## 2024-06-25 DIAGNOSIS — O09.522 MULTIGRAVIDA OF ADVANCED MATERNAL AGE IN SECOND TRIMESTER: Primary | ICD-10-CM

## 2024-06-25 DIAGNOSIS — Z36.86 ENCOUNTER FOR ANTENATAL SCREENING FOR CERVICAL LENGTH: ICD-10-CM

## 2024-06-25 DIAGNOSIS — O35.8XX0 SUSPECTED DAMAGE TO FETUS FROM DISEASE IN MOTHER, ANTEPARTUM, SINGLE GESTATION: ICD-10-CM

## 2024-06-25 DIAGNOSIS — O09.819 PREGNANCY RESULTING FROM IN VITRO FERTILIZATION, ANTEPARTUM: ICD-10-CM

## 2024-06-25 PROBLEM — Z37.9 VACUUM-ASSISTED VAGINAL DELIVERY: Status: RESOLVED | Noted: 2022-12-27 | Resolved: 2024-06-25

## 2024-06-25 PROCEDURE — 76817 TRANSVAGINAL US OBSTETRIC: CPT | Performed by: OBSTETRICS & GYNECOLOGY

## 2024-06-25 PROCEDURE — 99214 OFFICE O/P EST MOD 30 MIN: CPT | Performed by: OBSTETRICS & GYNECOLOGY

## 2024-06-25 PROCEDURE — 76811 OB US DETAILED SNGL FETUS: CPT | Performed by: OBSTETRICS & GYNECOLOGY

## 2024-06-25 NOTE — PROGRESS NOTES
This patient received  care under my supervision on 24 at 20w2d gestational age at [facility].  The note is contained in the ultrasound report located under OB Procedures tab in Epic.  Please call our office at [phone] with questions.  -Nithya Bell MD    The time spent on this patient on the encounter date included 5 minutes previsit service time reviewing records and precharting, 5 minutes face-to-face service time counseling regarding results and coordinating care, and  4 minutes charting, totalling 14 minutes.

## 2024-07-15 ENCOUNTER — ROUTINE PRENATAL (OUTPATIENT)
Facility: HOSPITAL | Age: 44
End: 2024-07-15
Payer: COMMERCIAL

## 2024-07-15 VITALS
DIASTOLIC BLOOD PRESSURE: 58 MMHG | SYSTOLIC BLOOD PRESSURE: 100 MMHG | HEIGHT: 64 IN | HEART RATE: 75 BPM | WEIGHT: 143.6 LBS | BODY MASS INDEX: 24.52 KG/M2

## 2024-07-15 DIAGNOSIS — O09.812 PREGNANCY RESULTING FROM IN VITRO FERTILIZATION IN SECOND TRIMESTER: Primary | ICD-10-CM

## 2024-07-15 DIAGNOSIS — Z3A.23 23 WEEKS GESTATION OF PREGNANCY: ICD-10-CM

## 2024-07-15 PROCEDURE — 76825 ECHO EXAM OF FETAL HEART: CPT | Performed by: OBSTETRICS & GYNECOLOGY

## 2024-07-15 PROCEDURE — 76827 ECHO EXAM OF FETAL HEART: CPT | Performed by: OBSTETRICS & GYNECOLOGY

## 2024-07-15 PROCEDURE — 93325 DOPPLER ECHO COLOR FLOW MAPG: CPT | Performed by: OBSTETRICS & GYNECOLOGY

## 2024-07-15 PROCEDURE — 99213 OFFICE O/P EST LOW 20 MIN: CPT | Performed by: OBSTETRICS & GYNECOLOGY

## 2024-07-15 NOTE — PROGRESS NOTES
The patient was seen today for an ultrasound.  Please see ultrasound report (located under Ob Procedures) for additional details.   Thank you very much for allowing us to participate in the care of this very nice patient.  Should you have any questions, please do not hesitate to contact me.     Yamil Chow MD FACOG  Attending Physician, Maternal-Fetal Medicine  [facility]

## 2024-07-17 ENCOUNTER — ROUTINE PRENATAL (OUTPATIENT)
Dept: OBGYN CLINIC | Facility: CLINIC | Age: 44
End: 2024-07-17

## 2024-07-17 VITALS — BODY MASS INDEX: 24.72 KG/M2 | WEIGHT: 144 LBS | DIASTOLIC BLOOD PRESSURE: 60 MMHG | SYSTOLIC BLOOD PRESSURE: 112 MMHG

## 2024-07-17 DIAGNOSIS — O09.812 PREGNANCY RESULTING FROM IN VITRO FERTILIZATION IN SECOND TRIMESTER: ICD-10-CM

## 2024-07-17 DIAGNOSIS — Z3A.23 23 WEEKS GESTATION OF PREGNANCY: Primary | ICD-10-CM

## 2024-07-17 DIAGNOSIS — O09.522 MULTIGRAVIDA OF ADVANCED MATERNAL AGE IN SECOND TRIMESTER: ICD-10-CM

## 2024-07-17 DIAGNOSIS — O09.899 SHORT INTERVAL BETWEEN PREGNANCIES AFFECTING PREGNANCY, ANTEPARTUM: ICD-10-CM

## 2024-07-17 PROCEDURE — PNV: Performed by: OBSTETRICS & GYNECOLOGY

## 2024-07-17 NOTE — PATIENT INSTRUCTIONS
You are due for your next set of labs. We request that you go to the lab to have your blood work done between 26-28 weeks.     Labs ordered include a blood count to check for anemia, a syphilis test which is state mandated and the 1 hour glucose test to screen for diabetes in pregnancy.     If you are on medication for hypothyroidism, we will also check a thyroid level (TSH).     If your blood type is RH negative, we will also check a type and screen and will recommend a Rhogam injection at your 28 week visit.       Glucose Testing Instructions  One-hour glucose tolerance test:    The test should be done at 26-28 weeks pregnant, unless otherwise directed by your physician.   Normally you should be avoiding sugars during pregnancy. Eat normally prior to the test.  You do not need fast. Once you have finished the glucola do NOT EAT or DRINK anything until your blood is drawn.   The glucola will be given to you by the .   You will be required to wait at the lab for one hour after drinking the glucola. After one hour, your blood will be drawn.   If the test is normal, it will be reported via [employer]. If it is abnormal, we will notify you and a 3 hour glucose test will be recommended.

## 2024-07-17 NOTE — PROGRESS NOTES
44 y.o.  female at 23w3d (Estimated Date of Delivery: 11/10/24) for PNV.    Pre- Vitals      Flowsheet Row Most Recent Value   Prenatal Assessment    Fetal Heart Rate 140   Movement Present   Prenatal Vitals    Blood Pressure 112/60   Weight - Scale 65.3 kg (144 lb)   Urine Albumin/Glucose    Dilation/Effacement/Station    Vaginal Drainage    Edema           TW.722 kg (6 lb)    Cramping/contractions: no  Bleeding: no  LOF: no  FM: yes  28 wk labs ordered: yes  TSH indicated & ordered: no  RH negative - type & screen ordered: no  Prenatal labs complete (including Heb B, HIV): yes : if NO, add to labs today.      RTO in 4 weeks.

## 2024-08-06 ENCOUNTER — ROUTINE PRENATAL (OUTPATIENT)
Dept: OBGYN CLINIC | Facility: CLINIC | Age: 44
End: 2024-08-06

## 2024-08-06 VITALS — BODY MASS INDEX: 24.89 KG/M2 | WEIGHT: 145 LBS | SYSTOLIC BLOOD PRESSURE: 104 MMHG | DIASTOLIC BLOOD PRESSURE: 66 MMHG

## 2024-08-06 DIAGNOSIS — Z3A.26 26 WEEKS GESTATION OF PREGNANCY: ICD-10-CM

## 2024-08-06 DIAGNOSIS — O09.522 MULTIGRAVIDA OF ADVANCED MATERNAL AGE IN SECOND TRIMESTER: Primary | ICD-10-CM

## 2024-08-06 DIAGNOSIS — O09.812 PREGNANCY RESULTING FROM IN VITRO FERTILIZATION IN SECOND TRIMESTER: ICD-10-CM

## 2024-08-06 DIAGNOSIS — O09.899 SHORT INTERVAL BETWEEN PREGNANCIES AFFECTING PREGNANCY, ANTEPARTUM: ICD-10-CM

## 2024-08-06 PROCEDURE — PNV

## 2024-08-06 NOTE — PROGRESS NOTES
Patient is a 43 YO  female presenting to the office at 26w2d for routine OB care.   Patient is feeling well today. Feels like baby is sitting much lower this pregnancy than her last. Mild, random right-sided cramping.  US with MFM 24  Fetal heart rate: 145  BP: 104/66  TWlb  Fetal Movement: yes, good movement   LOF: no  VB: no  CTX: no  Reviewed precautions  Call for concerns  RTO 2 weeks

## 2024-08-06 NOTE — PROGRESS NOTES
No VB, LOF or CTXs/cramping. Patient does report increased pelvic pressure/pulsing.    Patient unable to supply urine sample.

## 2024-08-12 ENCOUNTER — TELEPHONE (OUTPATIENT)
Dept: OBGYN CLINIC | Facility: CLINIC | Age: 44
End: 2024-08-12

## 2024-08-16 NOTE — PATIENT INSTRUCTIONS
"Patient Education     Your baby's movement before birth   The Basics   Written by the doctors and editors at [facility]   When should I start feeling my baby move? -- It depends. Most people first feel their baby moving in the uterus between about 16 and 20 weeks of pregnancy. It might take longer to feel movement if this is your first pregnancy or if the placenta is in the front of your uterus.  What kinds of movements should I feel? -- When you first feel your baby move, it might feel like a gentle flutter in your belly. This is sometimes called \"quickening.\" As the baby grows, their movements will get stronger. You will probably feel them kicking, rolling, and stretching. Later in pregnancy, you might be able to see and feel the baby moving from the outside.  You might notice that your baby is more active at certain times of the day or night. Even before birth, babies have periods of being asleep and awake. When your baby is sleeping, you might notice that they do not move as much.  Should I keep track of my baby's movements? -- If your pregnancy is healthy, you probably do not need to count or record your baby's movements. Feeling regular movement is a good sign that the baby is doing well.  In some cases, your doctor or midwife might ask you to keep track of your baby's movements. If so, they will tell you how to do this and when to call them.  A change in your baby's movements does not always mean that there is a problem. But in some cases, it can be a sign that the baby is having trouble. If your doctor or midwife is concerned, they can do tests to check on the baby.  If I am asked to track movement, how should I do it? -- There are different ways of tracking your baby's movement. This is sometimes called \"kick counting.\"  Your doctor or midwife will tell you exactly what to track. For example, they might ask you to write down:   How long it takes to feel 10 kicks or movements   How many times your baby moves " in 1 hour  Many experts consider at least 10 movements in 2 hours to be a sign that the baby is doing well. But there is no specific cutoff for exactly how much movement is healthy or unhealthy. Some babies are more active than others, and some pregnant people feel movement more easily than others. The main goal of kick counting is to get to know your baby's normal patterns so you can tell if anything changes.  If you are doing kick counting:   Choose a time of day when your baby is usually active.   Find a quiet place where you will not be distracted.   Lie down on your side in a comfortable position.   Check the clock, or set a timer.   Each time you feel your baby move or kick, write down the time. Some people use a smartphone zohra to keep track.   If your baby seems less active than usual, try moving around, eating a snack, and emptying your bladder. This can help wake the baby up if they are asleep.   Stop counting after you have felt 10 kicks, or after the length of time your doctor or midwife told you.  When should I call the doctor? -- Call your doctor or midwife for advice if:   You have concerns about your baby's movement.   Your baby is moving less than they normally do.   You notice a sudden change in the pattern of your baby's movements.   You have any other symptoms that worry you.  All topics are updated as new evidence becomes available and our peer review process is complete.  This topic retrieved from [employer] on: Feb 26, 2024.  Topic 896978 Version 1.0  Release: 32.2.4 - C32.56  © 2024 UpToDate, Inc. and/or its affiliates. All rights reserved.  Consumer Information Use and Disclaimer   Disclaimer: This generalized information is a limited summary of diagnosis, treatment, and/or medication information. It is not meant to be comprehensive and should be used as a tool to help the user understand and/or assess potential diagnostic and treatment options. It does NOT include all information about  conditions, treatments, medications, side effects, or risks that may apply to a specific patient. It is not intended to be medical advice or a substitute for the medical advice, diagnosis, or treatment of a health care provider based on the health care provider's examination and assessment of a patient's specific and unique circumstances. Patients must speak with a health care provider for complete information about their health, medical questions, and treatment options, including any risks or benefits regarding use of medications. This information does not endorse any treatments or medications as safe, effective, or approved for treating a specific patient. UpToDate, Inc. and its affiliates disclaim any warranty or liability relating to this information or the use thereof.The use of this information is governed by the Terms of Use, available at https://www.[employer].com/en/know/clinical-effectiveness-terms. © UpToDate, Inc. and its affiliates and/or licensors. All rights reserved.  Copyright   ©  UpToDate, Inc. and/or its affiliates. All rights reserved.  Thank you for choosing us for your  care today.  If you have any questions about your ultrasound or care, please do not hesitate to contact us or your primary obstetrician.        Some general instructions for your pregnancy are:    Exercise: Aim for 150 minutes per week of regular exercise.  Walking is great!  Nutrition: Choose healthy sources of calcium, iron, and protein.  Avoid ultraprocessed foods and added sugar.  Learn about Preeclampsia: preeclampsia is a common, potentially serious high blood pressure complication in pregnancy.  A blood pressure of 140mmHg (systolic or top number) or 90mmHg (diastolic or bottom number) should be evaluated by your doctor.  Aspirin is sometimes prescribed in early pregnancy to prevent preeclampsia in women with risk factors - ask your obstetrician if you should be on this medication.  For more  resources, visit:  https://www.highriskpregnancyinfo.org/preeclampsia  If you smoke, please try to quit completely but also try to reduce your smoking by as much as possible (as soon as possible).  Do not vape.  Please also avoid cannabis products.  Other warning signs to watch out for in pregnancy or postpartum: chest pain, obstructed breathing or shortness of breath, seizures, thoughts of hurting yourself or your baby, bleeding, a painful or swollen leg, fever, or headache (see AWNN POST-BIRTH Warning Signs campaign).  If these happen call 911.  Itching is also not normal in pregnancy and if you experience this, especially over your hands and feet, potentially worse at night, notify your doctors.

## 2024-08-19 ENCOUNTER — APPOINTMENT (OUTPATIENT)
Dept: LAB | Facility: CLINIC | Age: 44
End: 2024-08-19
Payer: COMMERCIAL

## 2024-08-19 ENCOUNTER — ROUTINE PRENATAL (OUTPATIENT)
Dept: OBGYN CLINIC | Facility: CLINIC | Age: 44
End: 2024-08-19
Payer: COMMERCIAL

## 2024-08-19 ENCOUNTER — LAB (OUTPATIENT)
Dept: LAB | Facility: CLINIC | Age: 44
End: 2024-08-19
Payer: COMMERCIAL

## 2024-08-19 VITALS — SYSTOLIC BLOOD PRESSURE: 112 MMHG | DIASTOLIC BLOOD PRESSURE: 66 MMHG | WEIGHT: 148 LBS | BODY MASS INDEX: 25.4 KG/M2

## 2024-08-19 DIAGNOSIS — Z3A.28 28 WEEKS GESTATION OF PREGNANCY: ICD-10-CM

## 2024-08-19 DIAGNOSIS — O09.899 SHORT INTERVAL BETWEEN PREGNANCIES AFFECTING PREGNANCY, ANTEPARTUM: ICD-10-CM

## 2024-08-19 DIAGNOSIS — Z00.8 ENCOUNTER FOR OTHER GENERAL EXAMINATION: ICD-10-CM

## 2024-08-19 DIAGNOSIS — O09.813 PREGNANCY RESULTING FROM IN VITRO FERTILIZATION IN THIRD TRIMESTER: ICD-10-CM

## 2024-08-19 DIAGNOSIS — O09.522 MULTIGRAVIDA OF ADVANCED MATERNAL AGE IN SECOND TRIMESTER: ICD-10-CM

## 2024-08-19 DIAGNOSIS — Z3A.23 23 WEEKS GESTATION OF PREGNANCY: ICD-10-CM

## 2024-08-19 DIAGNOSIS — Z23 ENCOUNTER FOR IMMUNIZATION: ICD-10-CM

## 2024-08-19 DIAGNOSIS — O09.523 AMA (ADVANCED MATERNAL AGE) MULTIGRAVIDA 35+, THIRD TRIMESTER: Primary | ICD-10-CM

## 2024-08-19 DIAGNOSIS — O09.812 PREGNANCY RESULTING FROM IN VITRO FERTILIZATION IN SECOND TRIMESTER: ICD-10-CM

## 2024-08-19 LAB
CHOLEST SERPL-MCNC: 241 MG/DL
ERYTHROCYTE [DISTWIDTH] IN BLOOD BY AUTOMATED COUNT: 13 % (ref 11.6–15.1)
EST. AVERAGE GLUCOSE BLD GHB EST-MCNC: 103 MG/DL
GLUCOSE 1H P 50 G GLC PO SERPL-MCNC: 141 MG/DL (ref 70–134)
HBA1C MFR BLD: 5.2 %
HCT VFR BLD AUTO: 33 % (ref 34.8–46.1)
HDLC SERPL-MCNC: 80 MG/DL
HGB BLD-MCNC: 10.8 G/DL (ref 11.5–15.4)
LDLC SERPL CALC-MCNC: 132 MG/DL (ref 0–100)
MCH RBC QN AUTO: 31.5 PG (ref 26.8–34.3)
MCHC RBC AUTO-ENTMCNC: 32.7 G/DL (ref 31.4–37.4)
MCV RBC AUTO: 96 FL (ref 82–98)
NONHDLC SERPL-MCNC: 161 MG/DL
PLATELET # BLD AUTO: 243 THOUSANDS/UL (ref 149–390)
PMV BLD AUTO: 9.8 FL (ref 8.9–12.7)
RBC # BLD AUTO: 3.43 MILLION/UL (ref 3.81–5.12)
TREPONEMA PALLIDUM IGG+IGM AB [PRESENCE] IN SERUM OR PLASMA BY IMMUNOASSAY: NORMAL
TRIGL SERPL-MCNC: 147 MG/DL
WBC # BLD AUTO: 7.66 THOUSAND/UL (ref 4.31–10.16)

## 2024-08-19 PROCEDURE — 90471 IMMUNIZATION ADMIN: CPT | Performed by: PHYSICIAN ASSISTANT

## 2024-08-19 PROCEDURE — 36415 COLL VENOUS BLD VENIPUNCTURE: CPT

## 2024-08-19 PROCEDURE — 82950 GLUCOSE TEST: CPT

## 2024-08-19 PROCEDURE — 80061 LIPID PANEL: CPT

## 2024-08-19 PROCEDURE — 85027 COMPLETE CBC AUTOMATED: CPT

## 2024-08-19 PROCEDURE — 90715 TDAP VACCINE 7 YRS/> IM: CPT | Performed by: PHYSICIAN ASSISTANT

## 2024-08-19 PROCEDURE — 83036 HEMOGLOBIN GLYCOSYLATED A1C: CPT

## 2024-08-19 PROCEDURE — 86780 TREPONEMA PALLIDUM: CPT

## 2024-08-19 PROCEDURE — PNV: Performed by: PHYSICIAN ASSISTANT

## 2024-08-19 NOTE — PROGRESS NOTES
Becca Styles is [id number], here for her routine ob appt; red folder/Tdap to be given today, breast pump to be ordered pending pt resonse.  Pt denies any LOF, VB, or CTXs.         UA neg/neg

## 2024-08-19 NOTE — PROGRESS NOTES
Patient is a 45 YO  female presenting to the office at 28.1 weeks for routine OB care.   BP: 112/66  TWG: 10lb  Fetal Movement: yes good movement   Feeling well today  LOF: no  VB: no  CTX: no  Discussed third trimester teaching  Reviewed fetal kick counts, normal FM  Reviewed signs of PTL  Reviewed red folder, consents, birth plan  Delivery consent obtained   Breastfeeding: plans to  Breast Pump: ordered  TDAP: received  FLU Vaccine: recommend  COVID Vaccine: recommend  Rhogam: not indicated   28 Week Labs: planning to complete today or tomorrow  Planning   RTO 2 weeks for routine OB F/U

## 2024-08-19 NOTE — PROGRESS NOTES
Please refer to the [facility] ultrasound report in Ob Procedures for additional information regarding today's visit

## 2024-08-20 ENCOUNTER — ULTRASOUND (OUTPATIENT)
Facility: HOSPITAL | Age: 44
End: 2024-08-20
Payer: COMMERCIAL

## 2024-08-20 VITALS
HEIGHT: 64 IN | BODY MASS INDEX: 25.27 KG/M2 | SYSTOLIC BLOOD PRESSURE: 110 MMHG | DIASTOLIC BLOOD PRESSURE: 58 MMHG | WEIGHT: 148 LBS | HEART RATE: 94 BPM

## 2024-08-20 DIAGNOSIS — O99.810 ABNORMAL GLUCOSE AFFECTING PREGNANCY: Primary | ICD-10-CM

## 2024-08-20 DIAGNOSIS — O09.813 PREGNANCY RESULTING FROM IN VITRO FERTILIZATION IN THIRD TRIMESTER: Primary | ICD-10-CM

## 2024-08-20 DIAGNOSIS — O09.893 SHORT INTERVAL BETWEEN PREGNANCIES COMPLICATING PREGNANCY, ANTEPARTUM, THIRD TRIMESTER: ICD-10-CM

## 2024-08-20 DIAGNOSIS — Z3A.28 28 WEEKS GESTATION OF PREGNANCY: ICD-10-CM

## 2024-08-20 PROCEDURE — 99213 OFFICE O/P EST LOW 20 MIN: CPT | Performed by: OBSTETRICS & GYNECOLOGY

## 2024-08-20 PROCEDURE — 76816 OB US FOLLOW-UP PER FETUS: CPT | Performed by: OBSTETRICS & GYNECOLOGY

## 2024-08-20 NOTE — LETTER
August 20, 2024     Ashlyn Capellan,   [address]  [address]  [place] [address]    Patient: Becca Styles   YOB: 1980   Date of Visit: 8/20/2024       Dear Dr. Capellan:    Thank you for referring Becca Styles to me for evaluation. Below are my notes for this consultation.    If you have questions, please do not hesitate to call me. I look forward to following your patient along with you.         Sincerely,        Jez Stone MD        CC: No Recipients    Jez Stone MD  8/20/2024 12:46 PM  Sign when Signing Visit  Please refer to the [facility] ultrasound report in Ob Procedures for additional information regarding today's visit

## 2024-08-27 ENCOUNTER — HOSPITAL ENCOUNTER (OUTPATIENT)
Facility: HOSPITAL | Age: 44
Discharge: HOME | End: 2024-08-27
Attending: OBSTETRICS & GYNECOLOGY | Admitting: OBSTETRICS & GYNECOLOGY
Payer: COMMERCIAL

## 2024-08-27 ENCOUNTER — NURSE TRIAGE (OUTPATIENT)
Age: 44
End: 2024-08-27

## 2024-08-27 VITALS
WEIGHT: 148 LBS | RESPIRATION RATE: 16 BRPM | SYSTOLIC BLOOD PRESSURE: 104 MMHG | BODY MASS INDEX: 25.27 KG/M2 | HEART RATE: 68 BPM | HEIGHT: 64 IN | DIASTOLIC BLOOD PRESSURE: 58 MMHG

## 2024-08-27 PROBLEM — R10.2 PELVIC PRESSURE IN FEMALE: Status: ACTIVE | Noted: 2024-08-27

## 2024-08-27 PROCEDURE — NC001 PR NO CHARGE: Performed by: OBSTETRICS & GYNECOLOGY

## 2024-08-27 PROCEDURE — 76817 TRANSVAGINAL US OBSTETRIC: CPT

## 2024-08-27 PROCEDURE — 99213 OFFICE O/P EST LOW 20 MIN: CPT

## 2024-08-27 NOTE — PROGRESS NOTES
"Triage Note - OB  Becca Styles 44 y.o. female MRN: [id number]  Unit/Bed#:  TRIAGE 1-01 Encounter: [id number]    OB TRIAGE NOTE  Becca Styles  [id number]  2024  12:58 PM  LD TRIAGE 1/LD TRIAGE 1-*    ASSESS:  44 y.o.  29w2d with complaint of bulge into vagina noticed when she sits associated with constant lower abdominal and vaginal pressure for several weeks.     PLAN  #1. Vaginal bulge  Reports sensation of vaginal bulge from posterior wall, pelvic and vaginal pressure  SVE: closed  Speculum exam: normal appearing vaginal epithelium, no vaginal discharge, no bleeding, grossly normal appearing cervix.   Microscopy wnl  TVUS cervical length: 4.22-5.19 cm    #2. IUP at 29w2d  Pregnancy complicated by AMA, IVF/ICSI  Hx of VAVD, 3c lac    #3. Discharge Instructions  Patient instructed to call if experiencing worsening contractions, vaginal bleeding, loss of fluid or decreased fetal movement.  Will follow up with OBGYN on 9/3/2024.    D/w Dr. Capellan  ______________    SUBJECTIVE    MELISSA: Estimated Date of Delivery: 11/10/24    HPI Chronology:  44 y.o.  29w2d presents with complaint of vaginal pressure and bulge. She states that she has had constant lower abdominal and vaginal pressure for several weeks; denies cramping/contractions. She says that when she sits down to go to the bathroom, she can feel a bulge in her vagina. She does not feel this bulge when standing or lying down.     She states that she does have constipation but that she has been having bowel movements every 2 days. She denies vaginal bleeding, leaking fluid. Endorses good fetal movement.       Vitals:   /58   Pulse 68   Resp 16   Ht 5' 4\" (1.626 m)   Wt 67.1 kg (148 lb)   LMP 2024   BMI 25.40 kg/m²   Body mass index is 25.4 kg/m².    Review of Systems   Constitutional:  Negative for chills and fever.   Respiratory: Negative.     Cardiovascular: Negative.    Gastrointestinal:  Negative for blood in stool, " constipation and rectal pain.   Genitourinary:  Positive for vaginal pain (vaginal pressure, sensation of bulge). Negative for dysuria and vaginal bleeding.       Physical Exam  Vitals reviewed. Exam conducted with a chaperone present.   Constitutional:       General: She is not in acute distress.     Appearance: Normal appearance.   HENT:      Head: Normocephalic and atraumatic.   Eyes:      Extraocular Movements: Extraocular movements intact.   Cardiovascular:      Rate and Rhythm: Normal rate.   Pulmonary:      Effort: Pulmonary effort is normal. No respiratory distress.   Abdominal:      Comments: gravid   Genitourinary:     Comments: Normal appearing external female genitalia, normal appearing urethral meatus. On speculum exam, normal appearing vaginal epithelium, no vaginal discharge, no bleeding, grossly normal appearing cervix. No bulge visualized. On digital exam, no bulge appreciated. Cervical os appears closed.     Neurological:      Mental Status: She is alert.         FHT:  Baseline Rate (FHR): 140 bpm  Variability: Moderate  Accelerations: 10 x 10 (<32 weeks), With fetal movement  Decelerations: None  TOCO:   Contraction Frequency (minutes): x1  Contraction Duration (seconds): 60  Contraction Intensity:  (n/a)    IMAGING:  TVUS Cervical Length:  - 4.22 cm  - 5.19 cm  - 4.64 cm        KOH/WTMT:     Infection:   - no clue cells    - no hyphae   - no trichomonads present    Membrane status   - no ferning   - no pooling         Helena Navas MD  OBGYN, PGY-I  08/27/24  1:06 PM

## 2024-08-27 NOTE — DISCHARGE INSTRUCTIONS
[email or web address]    Physical Therapy at [facility]’s team of trained female therapists offer a wide variety of services designed to address the special healthcare needs of women. Our specially trained clinicians work with you to address your concerns and come beside you to support and encourage you through the healing process. Our offices are designed to keep your sensitive treatments private at all times. We are here to ensure your comfort and mentor you through our pelvic floor therapy programs at your pace.    Our female team of experts treat the following conditions faced by women:      Urinary or bowel incontinence  Urinary urgency or frequency  Painful Lindrith  Painful Bladder Syndrome  Overactive Bladder  Constipation  Pelvic Girdle Pain             Sacroiliac Dysfunction   Gynecological Cancers    Pregnancy & Postpartum related discomfort  Buttock/Tailbone Pain                       Lumbar/Thoracic Pain  Hip Abnormalities/Pain    Scar Adhesions  Diastasis Recti  Osteoporosis          /Episiotomy Recovery  Vulvodynia  Pelvic Pain

## 2024-08-27 NOTE — TELEPHONE ENCOUNTER
"29 weeks 2 days EDC 11/10/24   Over the last 1 week she has had increased vaginal pressure, 3 days ago was sitting on toilet and felt bulge in her vagina. States the   vaginal area has increased in swelling and feels the bulge is approx size of peach - she verbalized her concern about pressure.  When she is standing does not feel the bulge but when she is sitting feels the bulge.  Patient states she is very active with mowing gras,lifting toddler, picking up laundry basket.  Denies any bladder issues, has constipation outside of pregnancy- using increased fiber and dietary measures. Baby is moving well,   No contractions, no lof, no bleeding. No appts available in office today. Due to change in increased vaginal pressure advised eval to L&D.   [facility] Dr. Capellan  [facility] AN Charge   Answer Assessment - Initial Assessment Questions  1. SYMPTOM: \"What's the main symptom you're concerned about?\" (e.g., rash, itching, swelling, dryness)      Bulge in vagina  2. LOCATION: \"Where is the  bulge located?\" (e.g., inside/outside, left/right)      Inside vagina  3. ONSET: \"When did the symptoms  start?\"      3 days  4. PAIN: \"Is there any pain?\" If Yes, ask: \"How bad is it?\" (Scale: 1-10; mild, moderate, severe)    -  MILD (1-3): doesn't interfere with normal activities     -  MODERATE (4-7): interferes with normal activities (e.g., work or school) or awakens from sleep      -  SEVERE (8-10): excruciating pain, unable to do any normal activities      Not painful  5. CAUSE: \"What do you think is causing the symptoms?\"      Vaginal   6. OTHER SYMPTOMS: \"Do you have any other symptoms?\" (e.g., fever, vaginal bleeding, pain with urination)      constipation  7. MELISSA: \"What date are you expecting to deliver?\"       11/10/24  8. PREGNANCY: \"How many weeks pregnant are you?\"      29 weeks 2 days    Protocols used: Pregnancy - Vulvar Symptoms-ADULT-AH    "

## 2024-08-27 NOTE — PROCEDURES
Becca Styles, a  at 29w2d with an MELISSA of 11/10/2024, by Embryo Transfer, was seen at [facility] for the following procedure(s): $Procedure Type: US - Transvaginal]                   Ultrasound Other  Cervical Length: 4.22  Funnel: No  Debris: No  Placenta Previa: No  Vasa Previa: No         Ultrasound Probe Disinfection    A transvaginal ultrasound was performed.   Prior to use, disinfection was performed with High Level Disinfection Process (Trophon)  Probe serial number [id number]:  [id number] was used    Helena Navas MD  24  1:04 PM

## 2024-08-28 ENCOUNTER — APPOINTMENT (OUTPATIENT)
Dept: LAB | Facility: CLINIC | Age: 44
End: 2024-08-28
Payer: COMMERCIAL

## 2024-08-28 DIAGNOSIS — O99.810 ABNORMAL GLUCOSE AFFECTING PREGNANCY: ICD-10-CM

## 2024-08-28 LAB
GLUCOSE 1H P 100 G GLC PO SERPL-MCNC: 115 MG/DL (ref 65–179)
GLUCOSE 2H P 100 G GLC PO SERPL-MCNC: 88 MG/DL (ref 65–154)
GLUCOSE 3H P 100 G GLC PO SERPL-MCNC: 70 MG/DL (ref 65–139)
GLUCOSE P FAST SERPL-MCNC: 87 MG/DL (ref 65–94)

## 2024-08-28 PROCEDURE — 82951 GLUCOSE TOLERANCE TEST (GTT): CPT

## 2024-08-28 PROCEDURE — 36415 COLL VENOUS BLD VENIPUNCTURE: CPT

## 2024-08-28 PROCEDURE — 82952 GTT-ADDED SAMPLES: CPT

## 2024-09-03 ENCOUNTER — ROUTINE PRENATAL (OUTPATIENT)
Dept: OBGYN CLINIC | Facility: CLINIC | Age: 44
End: 2024-09-03

## 2024-09-03 VITALS — SYSTOLIC BLOOD PRESSURE: 112 MMHG | BODY MASS INDEX: 26.13 KG/M2 | WEIGHT: 152.2 LBS | DIASTOLIC BLOOD PRESSURE: 62 MMHG

## 2024-09-03 DIAGNOSIS — Z3A.30 30 WEEKS GESTATION OF PREGNANCY: ICD-10-CM

## 2024-09-03 DIAGNOSIS — O09.523 AMA (ADVANCED MATERNAL AGE) MULTIGRAVIDA 35+, THIRD TRIMESTER: Primary | ICD-10-CM

## 2024-09-03 DIAGNOSIS — O09.899 SHORT INTERVAL BETWEEN PREGNANCIES AFFECTING PREGNANCY, ANTEPARTUM: ICD-10-CM

## 2024-09-03 DIAGNOSIS — O09.813 PREGNANCY RESULTING FROM IN VITRO FERTILIZATION IN THIRD TRIMESTER: ICD-10-CM

## 2024-09-03 PROCEDURE — PNV

## 2024-09-03 NOTE — PROGRESS NOTES
Patient is a 45 YO  female presenting to the office at 30w2d for routine OB care.   Patient is feeling well today.   Patient was seen at L&D 24 due to feeling of vaginal pressure and bulge. She notes that symptoms have not changed. She does feel lower pelvic and vaginal pressure. We discussed precautions today.   Fetal heart rate: 135  BP: 112/62  TWlb 3.2oz  Fetal Movement: yes, good movement   LOF: no  VB: no  CTX: no  Reviewed precautions  Call for concerns  RTO 2 weeks

## 2024-09-16 ENCOUNTER — ROUTINE PRENATAL (OUTPATIENT)
Dept: OBGYN CLINIC | Facility: CLINIC | Age: 44
End: 2024-09-16

## 2024-09-16 VITALS — BODY MASS INDEX: 26.16 KG/M2 | WEIGHT: 152.4 LBS | DIASTOLIC BLOOD PRESSURE: 58 MMHG | SYSTOLIC BLOOD PRESSURE: 100 MMHG

## 2024-09-16 DIAGNOSIS — O09.813 PREGNANCY RESULTING FROM IN VITRO FERTILIZATION IN THIRD TRIMESTER: ICD-10-CM

## 2024-09-16 DIAGNOSIS — Z3A.32 32 WEEKS GESTATION OF PREGNANCY: ICD-10-CM

## 2024-09-16 DIAGNOSIS — O09.523 AMA (ADVANCED MATERNAL AGE) MULTIGRAVIDA 35+, THIRD TRIMESTER: Primary | ICD-10-CM

## 2024-09-16 PROCEDURE — PNV: Performed by: PHYSICIAN ASSISTANT

## 2024-09-16 NOTE — PROGRESS NOTES
Becca Styles is [id number], here for her routine ob appt; pt denies any LOF, VB, or CTXs.  Pt is feeling well.           UA no specimen left from pt; unable to go.

## 2024-09-16 NOTE — PROGRESS NOTES
Patient is a 43 YO  female presenting to the office at 32.1 weeks for routine OB care.   BP: 100/58  TWlb  Fetal Movement: yes good movement   LOF: no  VB: no  CTX: no  Reviewed precautions  Call for concerns  RTO 2 weeks

## 2024-09-24 ENCOUNTER — TELEPHONE (OUTPATIENT)
Dept: OBGYN CLINIC | Facility: CLINIC | Age: 44
End: 2024-09-24

## 2024-09-30 ENCOUNTER — ROUTINE PRENATAL (OUTPATIENT)
Dept: OBGYN CLINIC | Facility: CLINIC | Age: 44
End: 2024-09-30

## 2024-09-30 VITALS
DIASTOLIC BLOOD PRESSURE: 70 MMHG | HEIGHT: 64 IN | BODY MASS INDEX: 26.26 KG/M2 | SYSTOLIC BLOOD PRESSURE: 118 MMHG | WEIGHT: 153.8 LBS

## 2024-09-30 DIAGNOSIS — O09.899 SHORT INTERVAL BETWEEN PREGNANCIES AFFECTING PREGNANCY, ANTEPARTUM: ICD-10-CM

## 2024-09-30 DIAGNOSIS — O09.523 AMA (ADVANCED MATERNAL AGE) MULTIGRAVIDA 35+, THIRD TRIMESTER: Primary | ICD-10-CM

## 2024-09-30 DIAGNOSIS — Z3A.34 34 WEEKS GESTATION OF PREGNANCY: ICD-10-CM

## 2024-09-30 DIAGNOSIS — O09.813 PREGNANCY RESULTING FROM IN VITRO FERTILIZATION IN THIRD TRIMESTER: ICD-10-CM

## 2024-09-30 PROCEDURE — PNV

## 2024-09-30 NOTE — PROGRESS NOTES
Patient is a 45 YO  female presenting to the office at 34w1d for routine OB care.   Patient is feeling well today.   NSTs to begin @ 36 weeks  US with MFM 10/16/2024  Fetal heart rate: 140  BP: 118/70  TWG: 15lb 12.8oz  Fetal Movement: yes, good movement   LOF: no  VB: no  CTX: no  Reviewed precautions  Call for concerns  RTO 2 weeks

## 2024-10-01 ENCOUNTER — TELEPHONE (OUTPATIENT)
Age: 44
End: 2024-10-01

## 2024-10-01 NOTE — TELEPHONE ENCOUNTER
Patient called in regarding paperwork that was filled out for her job. The one posted 9/24 in media folder needs to be updated with dates for her leave. Where they put her MELISSA on page 3 it needs to have her start date for 10/9/24. Please update and ok to send message VIA [employer] once complete. Thank you.

## 2024-10-09 ENCOUNTER — TELEPHONE (OUTPATIENT)
Dept: OBGYN CLINIC | Facility: CLINIC | Age: 44
End: 2024-10-09

## 2024-10-11 NOTE — PATIENT INSTRUCTIONS
"Patient Education     Your baby's movement before birth   The Basics   Written by the doctors and editors at [facility]   When should I start feeling my baby move? -- It depends. Most people first feel their baby moving in the uterus between about 16 and 20 weeks of pregnancy. It might take longer to feel movement if this is your first pregnancy or if the placenta is in the front of your uterus.  What kinds of movements should I feel? -- When you first feel your baby move, it might feel like a gentle flutter in your belly. This is sometimes called \"quickening.\" As the baby grows, their movements will get stronger. You will probably feel them kicking, rolling, and stretching. Later in pregnancy, you might be able to see and feel the baby moving from the outside.  You might notice that your baby is more active at certain times of the day or night. Even before birth, babies have periods of being asleep and awake. When your baby is sleeping, you might notice that they do not move as much.  Should I keep track of my baby's movements? -- If your pregnancy is healthy, you probably do not need to count or record your baby's movements. Feeling regular movement is a good sign that the baby is doing well.  In some cases, your doctor or midwife might ask you to keep track of your baby's movements. If so, they will tell you how to do this and when to call them.  A change in your baby's movements does not always mean that there is a problem. But in some cases, it can be a sign that the baby is having trouble. If your doctor or midwife is concerned, they can do tests to check on the baby.  If I am asked to track movement, how should I do it? -- There are different ways of tracking your baby's movement. This is sometimes called \"kick counting.\"  Your doctor or midwife will tell you exactly what to track. For example, they might ask you to write down:   How long it takes to feel 10 kicks or movements   How many times your baby moves " in 1 hour  Many experts consider at least 10 movements in 2 hours to be a sign that the baby is doing well. But there is no specific cutoff for exactly how much movement is healthy or unhealthy. Some babies are more active than others, and some pregnant people feel movement more easily than others. The main goal of kick counting is to get to know your baby's normal patterns so you can tell if anything changes.  If you are doing kick counting:   Choose a time of day when your baby is usually active.   Find a quiet place where you will not be distracted.   Lie down on your side in a comfortable position.   Check the clock, or set a timer.   Each time you feel your baby move or kick, write down the time. Some people use a smartphone zohra to keep track.   If your baby seems less active than usual, try moving around, eating a snack, and emptying your bladder. This can help wake the baby up if they are asleep.   Stop counting after you have felt 10 kicks, or after the length of time your doctor or midwife told you.  When should I call the doctor? -- Call your doctor or midwife for advice if:   You have concerns about your baby's movement.   Your baby is moving less than they normally do.   You notice a sudden change in the pattern of your baby's movements.   You have any other symptoms that worry you.  All topics are updated as new evidence becomes available and our peer review process is complete.  This topic retrieved from GitHub on: Feb 26, 2024.  Topic 292024 Version 1.0  Release: 32.2.4 - C32.56  © 2024 UpToDate, Inc. and/or its affiliates. All rights reserved.  Consumer Information Use and Disclaimer   Disclaimer: This generalized information is a limited summary of diagnosis, treatment, and/or medication information. It is not meant to be comprehensive and should be used as a tool to help the user understand and/or assess potential diagnostic and treatment options. It does NOT include all information about  conditions, treatments, medications, side effects, or risks that may apply to a specific patient. It is not intended to be medical advice or a substitute for the medical advice, diagnosis, or treatment of a health care provider based on the health care provider's examination and assessment of a patient's specific and unique circumstances. Patients must speak with a health care provider for complete information about their health, medical questions, and treatment options, including any risks or benefits regarding use of medications. This information does not endorse any treatments or medications as safe, effective, or approved for treating a specific patient. UpToDate, Inc. and its affiliates disclaim any warranty or liability relating to this information or the use thereof.The use of this information is governed by the Terms of Use, available at https://www.xCloud.com/en/know/clinical-effectiveness-terms. © UpToDate, Inc. and its affiliates and/or licensors. All rights reserved.  Copyright   ©  UpToDate, Inc. and/or its affiliates. All rights reserved.  Thank you for choosing us for your  care today.  If you have any questions about your ultrasound or care, please do not hesitate to contact us or your primary obstetrician.        Some general instructions for your pregnancy are:    Exercise: Aim for 150 minutes per week of regular exercise.  Walking is great!  Nutrition: Choose healthy sources of calcium, iron, and protein.  Avoid ultraprocessed foods and added sugar.  Learn about Preeclampsia: preeclampsia is a common, potentially serious high blood pressure complication in pregnancy.  A blood pressure of 140mmHg (systolic or top number) or 90mmHg (diastolic or bottom number) should be evaluated by your doctor.  Aspirin is sometimes prescribed in early pregnancy to prevent preeclampsia in women with risk factors - ask your obstetrician if you should be on this medication.  For more  resources, visit:  https://www.highriskpregnancyinfo.org/preeclampsia  If you smoke, please try to quit completely but also try to reduce your smoking by as much as possible (as soon as possible).  Do not vape.  Please also avoid cannabis products.  Other warning signs to watch out for in pregnancy or postpartum: chest pain, obstructed breathing or shortness of breath, seizures, thoughts of hurting yourself or your baby, bleeding, a painful or swollen leg, fever, or headache (see [facility] POST-BIRTH Warning Signs campaign).  If these happen call 911.  Itching is also not normal in pregnancy and if you experience this, especially over your hands and feet, potentially worse at night, notify your doctors.       Nonstress Test for Pregnancy   WHAT YOU NEED TO KNOW:   What do I need to know about a nonstress test?  A nonstress test measures your baby's heart rate and movements. Nonstress means that no stress will be placed on your baby during the test.  How do I prepare for a nonstress test?  Your healthcare provider will talk to you about how to prepare for this test. Your provider may tell you to eat and drink plenty of liquids before your test. If you smoke, you may be asked not to smoke within 2 hours before the test. Your provider will also tell you which medicines to take or not take on the day of your test.  What will happen during a nonstress test?  You may be asked to lie down or recline back for the test on a bed. One or 2 belts with sensors will be placed around your abdomen. Your baby's heart rate will be recorded with a machine. If your baby does not move, your baby may be asleep. Your healthcare provider may make a noise near your abdomen to try to wake your baby. The test usually takes about 20 minutes, but can take longer if your baby needs to be awakened.        What do I need to know about the test results?  Your baby will be expected to move at least 2 times for a certain amount of time. Your baby's  heart rate will be expected to go up by a certain number of beats per minute during movement. If your baby does not move as expected, the test may need to be repeated or you may need other tests.  CARE AGREEMENT:   You have the right to help plan your care. Learn about your health condition and how it may be treated. Discuss treatment options with your healthcare providers to decide what care you want to receive. You always have the right to refuse treatment. The above information is an  only. It is not intended as medical advice for individual conditions or treatments. Talk to your doctor, nurse or pharmacist before following any medical regimen to see if it is safe and effective for you.  © Copyright [employer] 2023 Information is for End User's use only and may not be sold, redistributed or otherwise used for commercial purposes.       Kick Counts in Pregnancy   AMBULATORY CARE:   Kick counts  measure how much your baby is moving in your womb. A kick from your baby can be felt as a twist, turn, swish, roll, or jab. It is common to feel your baby kicking at 26 to 28 weeks of pregnancy. You may feel your baby kick as early as 20 weeks of pregnancy. You may want to start counting at 28 weeks.   Contact your doctor immediately if:   You feel a change in the number of kicks or movements of your baby.      You feel fewer than 10 kicks within 2 hours.      You have questions or concerns about your baby's movements.     Why measure kick counts:  Your baby's movement may provide information about your baby's health. He or she may move less, or not at all, if there are problems. Your baby may move less if he or she is not getting enough oxygen or nutrition from the placenta. Do not smoke while you are pregnant. Smoking decreases the amount of oxygen that gets to your baby. Talk to your healthcare provider if you need help to quit smoking. Tell your healthcare provider as soon as you feel a change in your  baby's movements.  When to measure kick counts:   Measure kick counts at the same time every day.       Measure kick counts when your baby is awake and most active. Your baby may be most active in the evening.     How to measure kick counts:  Check that your baby is awake before you measure kick counts. You can wake up your baby by lightly pushing on your belly, walking, or drinking something cold. Your healthcare provider may tell you different ways to measure kick counts. You may be told to do the following:  Use a chart or clock to keep track of the time you start and finish counting.      Sit in a chair or lie on your left side.      Place your hands on the largest part of your belly.      Count until you reach 10 kicks. Write down how much time it takes to count 10 kicks.      It may take 30 minutes to 2 hours to count 10 kicks. It should not take more than 2 hours to count 10 kicks.     Follow up with your doctor as directed:  Write down your questions so you remember to ask them during your visits.   © Copyright [employer] 2023 Information is for End User's use only and may not be sold, redistributed or otherwise used for commercial purposes.  The above information is an  only. It is not intended as medical advice for individual conditions or treatments. Talk to your doctor, nurse or pharmacist before following any medical regimen to see if it is safe and effective for you.

## 2024-10-13 NOTE — PROGRESS NOTES
Please refer to the [facility] ultrasound report in Ob Procedures for additional information regarding today's visit

## 2024-10-15 ENCOUNTER — ROUTINE PRENATAL (OUTPATIENT)
Dept: OBGYN CLINIC | Facility: CLINIC | Age: 44
End: 2024-10-15

## 2024-10-15 VITALS — DIASTOLIC BLOOD PRESSURE: 64 MMHG | WEIGHT: 154.4 LBS | BODY MASS INDEX: 26.5 KG/M2 | SYSTOLIC BLOOD PRESSURE: 102 MMHG

## 2024-10-15 DIAGNOSIS — O09.523 AMA (ADVANCED MATERNAL AGE) MULTIGRAVIDA 35+, THIRD TRIMESTER: Primary | ICD-10-CM

## 2024-10-15 DIAGNOSIS — Z3A.36 36 WEEKS GESTATION OF PREGNANCY: ICD-10-CM

## 2024-10-15 DIAGNOSIS — O09.813 PREGNANCY RESULTING FROM IN VITRO FERTILIZATION IN THIRD TRIMESTER: ICD-10-CM

## 2024-10-15 PROCEDURE — 87491 CHLMYD TRACH DNA AMP PROBE: CPT | Performed by: PHYSICIAN ASSISTANT

## 2024-10-15 PROCEDURE — 87591 N.GONORRHOEAE DNA AMP PROB: CPT | Performed by: PHYSICIAN ASSISTANT

## 2024-10-15 PROCEDURE — PNV: Performed by: PHYSICIAN ASSISTANT

## 2024-10-15 PROCEDURE — 87150 DNA/RNA AMPLIFIED PROBE: CPT | Performed by: PHYSICIAN ASSISTANT

## 2024-10-15 NOTE — PROGRESS NOTES
Patient is a 45 YO  female presenting to the office at 36.2 weeks for routine OB care.   BP: 102/64  TWlb  Fetal Movement: yes good movement   LOF: no  VB: no  CTX: no  GBS and GC collected  SVE 1/0/-3  Desires 39 week IOL with Dr. Shrestha  Reviewed precautions  Call for concerns  RTO 1 weeks

## 2024-10-15 NOTE — PROGRESS NOTES
Patient states she is feeling well, no complaints. GBS, gc/ct ordered. Declines flu     Urine neg/neg

## 2024-10-16 ENCOUNTER — ULTRASOUND (OUTPATIENT)
Facility: HOSPITAL | Age: 44
End: 2024-10-16
Payer: COMMERCIAL

## 2024-10-16 VITALS
SYSTOLIC BLOOD PRESSURE: 98 MMHG | HEIGHT: 64 IN | WEIGHT: 155.6 LBS | BODY MASS INDEX: 26.56 KG/M2 | HEART RATE: 68 BPM | DIASTOLIC BLOOD PRESSURE: 56 MMHG

## 2024-10-16 DIAGNOSIS — O09.813 PREGNANCY RESULTING FROM IN VITRO FERTILIZATION IN THIRD TRIMESTER: ICD-10-CM

## 2024-10-16 DIAGNOSIS — Z3A.36 36 WEEKS GESTATION OF PREGNANCY: ICD-10-CM

## 2024-10-16 DIAGNOSIS — O09.893 SHORT INTERVAL BETWEEN PREGNANCIES COMPLICATING PREGNANCY, ANTEPARTUM, THIRD TRIMESTER: ICD-10-CM

## 2024-10-16 DIAGNOSIS — O09.523 ELDERLY MULTIGRAVIDA, THIRD TRIMESTER: Primary | ICD-10-CM

## 2024-10-16 LAB
C TRACH DNA SPEC QL NAA+PROBE: NEGATIVE
N GONORRHOEA DNA SPEC QL NAA+PROBE: NEGATIVE

## 2024-10-16 PROCEDURE — 59025 FETAL NON-STRESS TEST: CPT | Performed by: OBSTETRICS & GYNECOLOGY

## 2024-10-16 PROCEDURE — 76816 OB US FOLLOW-UP PER FETUS: CPT | Performed by: OBSTETRICS & GYNECOLOGY

## 2024-10-16 PROCEDURE — 99213 OFFICE O/P EST LOW 20 MIN: CPT | Performed by: OBSTETRICS & GYNECOLOGY

## 2024-10-16 NOTE — LETTER
NST sleeve cover sheet    Patient name: Becca Styles  : 1980  MRN: [id number]    MELISSA: Estimated Date of Delivery: 11/10/24    Obstetrician: Josefina    Reason(s) for testing: AMA    Testing frequency:    ___  2x/wk  ___  1x/wk  ___  Dopplers  ___  BPP?      Last growth scan: __________, _________, _________    Baby:      Male   /   Female   /   [place]             Baby Name: ___________________            IOL or  C/S: _____________________

## 2024-10-16 NOTE — TELEPHONE ENCOUNTER
.Overall how are you feeling? good    Compliant with routine OB appointments? yes    Have you completed your 3rd trimester lab work? yes    Have you reviewed the contents of 3rd trimester folder from office? yes   Have you decided on a pediatrician?  yes    If yes, who same one as son     If no, reviewed practices and transferred call to [phone] to set up appointment with pediatric office.   Questions on paperwork to go back to office? no   Questions on the baby birth certificate and photography forms? No      Send link for the Hospital Readiness Video via [employer]

## 2024-10-16 NOTE — LETTER
October 16, 2024     Silvina Shrestha MD  [address]  [address]  [place] [address]    Patient: Becca Styles   YOB: 1980   Date of Visit: 10/16/2024       Dear Dr. Shrestha:    Thank you for referring Becca Styles to me for evaluation. Below are my notes for this consultation.    If you have questions, please do not hesitate to call me. I look forward to following your patient along with you.         Sincerely,        Jez Stone MD        CC: No Recipients    Jez Stone MD  10/16/2024  1:20 PM  Sign when Signing Visit  Please refer to the [facility] ultrasound report in Ob Procedures for additional information regarding today's visit

## 2024-10-16 NOTE — PROGRESS NOTES
Repeat Non-Stress Testing:    Patient verbalizes +FM. Pt denies ALL:               Leaking of fluid   Contractions   Vaginal bleeding   Decreased fetal movement    Patient is performing daily kick counts. Patient has no questions or concerns.   NST strip reviewed by Dr. Stone.

## 2024-10-17 LAB — GP B STREP DNA SPEC QL NAA+PROBE: NEGATIVE

## 2024-10-18 NOTE — PATIENT INSTRUCTIONS
Kick Counts in Pregnancy   AMBULATORY CARE:   Kick counts  measure how much your baby is moving in your womb. A kick from your baby can be felt as a twist, turn, swish, roll, or jab. It is common to feel your baby kicking at 26 to 28 weeks of pregnancy. You may feel your baby kick as early as 20 weeks of pregnancy. You may want to start counting at 28 weeks.   Contact your doctor immediately if:   You feel a change in the number of kicks or movements of your baby.      You feel fewer than 10 kicks within 2 hours.      You have questions or concerns about your baby's movements.     Why measure kick counts:  Your baby's movement may provide information about your baby's health. He or she may move less, or not at all, if there are problems. Your baby may move less if he or she is not getting enough oxygen or nutrition from the placenta. Do not smoke while you are pregnant. Smoking decreases the amount of oxygen that gets to your baby. Talk to your healthcare provider if you need help to quit smoking. Tell your healthcare provider as soon as you feel a change in your baby's movements.  When to measure kick counts:   Measure kick counts at the same time every day.       Measure kick counts when your baby is awake and most active. Your baby may be most active in the evening.     How to measure kick counts:  Check that your baby is awake before you measure kick counts. You can wake up your baby by lightly pushing on your belly, walking, or drinking something cold. Your healthcare provider may tell you different ways to measure kick counts. You may be told to do the following:  Use a chart or clock to keep track of the time you start and finish counting.      Sit in a chair or lie on your left side.      Place your hands on the largest part of your belly.      Count until you reach 10 kicks. Write down how much time it takes to count 10 kicks.      It may take 30 minutes to 2 hours to count 10 kicks. It should not take more  than 2 hours to count 10 kicks.     Follow up with your doctor as directed:  Write down your questions so you remember to ask them during your visits.   ©  Mer2023 Information is for End User's use only and may not be sold, redistributed or otherwise used for commercial purposes.  The above information is an  only. It is not intended as medical advice for individual conditions or treatments. Talk to your doctor, nurse or pharmacist before following any medical regimen to see if it is safe and effective for you. Thank you for choosing us for your  care today.  If you have any questions about your ultrasound or care, please do not hesitate to contact us or your primary obstetrician.        Some general instructions for your pregnancy are:    Exercise: Aim for 150 minutes per week of regular exercise.  Walking is great!  Nutrition: Choose healthy sources of calcium, iron, and protein.  Avoid ultraprocessed foods and added sugar.  Learn about Preeclampsia: preeclampsia is a common, potentially serious high blood pressure complication in pregnancy.  A blood pressure of 140mmHg (systolic or top number) or 90mmHg (diastolic or bottom number) should be evaluated by your doctor.  Aspirin is sometimes prescribed in early pregnancy to prevent preeclampsia in women with risk factors - ask your obstetrician if you should be on this medication.  For more resources, visit:  https://www.highriskpregnancyinfo.org/preeclampsia  If you smoke, please try to quit completely but also try to reduce your smoking by as much as possible (as soon as possible).  Do not vape.  Please also avoid cannabis products.  Other warning signs to watch out for in pregnancy or postpartum: chest pain, obstructed breathing or shortness of breath, seizures, thoughts of hurting yourself or your baby, bleeding, a painful or swollen leg, fever, or headache (see [facility] POST-BIRTH Warning Signs campaign).  If these happen  call 911.  Itching is also not normal in pregnancy and if you experience this, especially over your hands and feet, potentially worse at night, notify your doctors.         Nonstress Test for Pregnancy   WHAT YOU NEED TO KNOW:   What do I need to know about a nonstress test?  A nonstress test measures your baby's heart rate and movements. Nonstress means that no stress will be placed on your baby during the test.  How do I prepare for a nonstress test?  Your healthcare provider will talk to you about how to prepare for this test. Your provider may tell you to eat and drink plenty of liquids before your test. If you smoke, you may be asked not to smoke within 2 hours before the test. Your provider will also tell you which medicines to take or not take on the day of your test.  What will happen during a nonstress test?  You may be asked to lie down or recline back for the test on a bed. One or 2 belts with sensors will be placed around your abdomen. Your baby's heart rate will be recorded with a machine. If your baby does not move, your baby may be asleep. Your healthcare provider may make a noise near your abdomen to try to wake your baby. The test usually takes about 20 minutes, but can take longer if your baby needs to be awakened.        What do I need to know about the test results?  Your baby will be expected to move at least 2 times for a certain amount of time. Your baby's heart rate will be expected to go up by a certain number of beats per minute during movement. If your baby does not move as expected, the test may need to be repeated or you may need other tests.  CARE AGREEMENT:   You have the right to help plan your care. Learn about your health condition and how it may be treated. Discuss treatment options with your healthcare providers to decide what care you want to receive. You always have the right to refuse treatment. The above information is an  only. It is not intended as medical  advice for individual conditions or treatments. Talk to your doctor, nurse or pharmacist before following any medical regimen to see if it is safe and effective for you.  © Copyright [employer] 2023 Information is for End User's use only and may not be sold, redistributed or otherwise used for commercial purposes.       Kick Counts in Pregnancy   AMBULATORY CARE:   Kick counts  measure how much your baby is moving in your womb. A kick from your baby can be felt as a twist, turn, swish, roll, or jab. It is common to feel your baby kicking at 26 to 28 weeks of pregnancy. You may feel your baby kick as early as 20 weeks of pregnancy. You may want to start counting at 28 weeks.   Contact your doctor immediately if:   You feel a change in the number of kicks or movements of your baby.      You feel fewer than 10 kicks within 2 hours.      You have questions or concerns about your baby's movements.     Why measure kick counts:  Your baby's movement may provide information about your baby's health. He or she may move less, or not at all, if there are problems. Your baby may move less if he or she is not getting enough oxygen or nutrition from the placenta. Do not smoke while you are pregnant. Smoking decreases the amount of oxygen that gets to your baby. Talk to your healthcare provider if you need help to quit smoking. Tell your healthcare provider as soon as you feel a change in your baby's movements.  When to measure kick counts:   Measure kick counts at the same time every day.       Measure kick counts when your baby is awake and most active. Your baby may be most active in the evening.     How to measure kick counts:  Check that your baby is awake before you measure kick counts. You can wake up your baby by lightly pushing on your belly, walking, or drinking something cold. Your healthcare provider may tell you different ways to measure kick counts. You may be told to do the following:  Use a chart or clock to keep  track of the time you start and finish counting.      Sit in a chair or lie on your left side.      Place your hands on the largest part of your belly.      Count until you reach 10 kicks. Write down how much time it takes to count 10 kicks.      It may take 30 minutes to 2 hours to count 10 kicks. It should not take more than 2 hours to count 10 kicks.     Follow up with your doctor as directed:  Write down your questions so you remember to ask them during your visits.   © Copyright [employer] 2023 Information is for End User's use only and may not be sold, redistributed or otherwise used for commercial purposes.  The above information is an  only. It is not intended as medical advice for individual conditions or treatments. Talk to your doctor, nurse or pharmacist before following any medical regimen to see if it is safe and effective for you.

## 2024-10-20 NOTE — PROGRESS NOTES
Please refer to the [facility] ultrasound report in Ob Procedures for additional information regarding today's visit

## 2024-10-21 ENCOUNTER — ROUTINE PRENATAL (OUTPATIENT)
Dept: OBGYN CLINIC | Facility: CLINIC | Age: 44
End: 2024-10-21

## 2024-10-21 VITALS — SYSTOLIC BLOOD PRESSURE: 102 MMHG | WEIGHT: 157.8 LBS | BODY MASS INDEX: 27.09 KG/M2 | DIASTOLIC BLOOD PRESSURE: 60 MMHG

## 2024-10-21 DIAGNOSIS — O09.523 AMA (ADVANCED MATERNAL AGE) MULTIGRAVIDA 35+, THIRD TRIMESTER: Primary | ICD-10-CM

## 2024-10-21 DIAGNOSIS — Z3A.37 37 WEEKS GESTATION OF PREGNANCY: ICD-10-CM

## 2024-10-21 PROCEDURE — PNV: Performed by: OBSTETRICS & GYNECOLOGY

## 2024-10-21 NOTE — PROGRESS NOTES
44 y.o.  female at 37w1d (Estimated Date of Delivery: 11/10/24) for PNV.    Pre- Vitals      Flowsheet Row Most Recent Value   Prenatal Assessment    Fetal Heart Rate 145   Fundal Height (cm) 37 cm   Movement Present   Prenatal Vitals    Blood Pressure 102/60   Weight - Scale 71.6 kg (157 lb 12.8 oz)   Urine Albumin/Glucose    Dilation/Effacement/Station    Cervical Dilation 3   Cervical Effacement 50   Fetal Station -2   Vaginal Drainage    Edema           TW.981 kg (19 lb 12.8 oz)    Leakage of fluid: no  Vaginal bleeding: no  Contractions/Cramping: no  Fetal movement: yes    Most recent EFW at 36w: 7lb 6oz 89%ile    - AC 99%, HC 26%    - she is proven to 8lb 13oz     She is unsure if she feels this baby is bigger or not. She feels like she is carrying differently and baby is much lower. Per MFM note, recommending delivery in the 39th week. She highly desires delivery with Dr. MARTINEZ.     Today we discussed IOL and getting her scheduled.   Additionally, we discussed shoulder dystocia and counseled on risks.   She is aware that shoulder dystocia is unpredictable. Will look to get her scheduled in the 39th week with Dr. LANE     RTO in 1 weeks.

## 2024-10-21 NOTE — PROGRESS NOTES
44 y.o.  female at 37w1d (Estimated Date of Delivery: 11/10/24) for PNV.    Pre- Vitals      Flowsheet Row Most Recent Value   Prenatal Assessment    Movement Present   Prenatal Vitals    Blood Pressure 102/60   Weight - Scale 71.6 kg (157 lb 12.8 oz)   Urine Albumin/Glucose    Dilation/Effacement/Station    Vaginal Drainage    Edema           TW.981 kg (19 lb 12.8 oz)    Leakage of fluid: no  Vaginal bleeding: no  Contractions/Cramping: no  Fetal movement: yes  Hx of VAVD with 3C laceration --   Desires 39wk IOL.   RTO in 1 weeks.

## 2024-10-23 ENCOUNTER — ULTRASOUND (OUTPATIENT)
Facility: HOSPITAL | Age: 44
End: 2024-10-23
Payer: COMMERCIAL

## 2024-10-23 VITALS
HEART RATE: 91 BPM | BODY MASS INDEX: 26.84 KG/M2 | WEIGHT: 157.2 LBS | HEIGHT: 64 IN | SYSTOLIC BLOOD PRESSURE: 108 MMHG | DIASTOLIC BLOOD PRESSURE: 60 MMHG

## 2024-10-23 DIAGNOSIS — Z3A.37 37 WEEKS GESTATION OF PREGNANCY: Primary | ICD-10-CM

## 2024-10-23 DIAGNOSIS — O09.523 ELDERLY MULTIGRAVIDA, THIRD TRIMESTER: ICD-10-CM

## 2024-10-23 PROCEDURE — 76815 OB US LIMITED FETUS(S): CPT | Performed by: OBSTETRICS & GYNECOLOGY

## 2024-10-23 PROCEDURE — 59025 FETAL NON-STRESS TEST: CPT | Performed by: OBSTETRICS & GYNECOLOGY

## 2024-10-23 NOTE — LETTER
October 23, 2024     Ashlyn Capellan,   [address]  [address]  [place] [address]    Patient: Becca Styles   YOB: 1980   Date of Visit: 10/23/2024       Dear Dr. Capellan:    Thank you for referring Becca Styles to me for evaluation. Below are my notes for this consultation.    If you have questions, please do not hesitate to call me. I look forward to following your patient along with you.         Sincerely,        Jez Stone MD        CC: No Recipients    Jez Stone MD  10/20/2024  6:14 PM  Sign when Signing Visit  Please refer to the [facility] ultrasound report in Ob Procedures for additional information regarding today's visit

## 2024-10-29 ENCOUNTER — ROUTINE PRENATAL (OUTPATIENT)
Dept: OBGYN CLINIC | Facility: CLINIC | Age: 44
End: 2024-10-29

## 2024-10-29 VITALS — DIASTOLIC BLOOD PRESSURE: 72 MMHG | WEIGHT: 157.6 LBS | BODY MASS INDEX: 27.05 KG/M2 | SYSTOLIC BLOOD PRESSURE: 118 MMHG

## 2024-10-29 DIAGNOSIS — O09.899 SHORT INTERVAL BETWEEN PREGNANCIES AFFECTING PREGNANCY, ANTEPARTUM: ICD-10-CM

## 2024-10-29 DIAGNOSIS — O09.813 PREGNANCY RESULTING FROM IN VITRO FERTILIZATION IN THIRD TRIMESTER: ICD-10-CM

## 2024-10-29 DIAGNOSIS — Z3A.38 38 WEEKS GESTATION OF PREGNANCY: ICD-10-CM

## 2024-10-29 DIAGNOSIS — O09.523 AMA (ADVANCED MATERNAL AGE) MULTIGRAVIDA 35+, THIRD TRIMESTER: Primary | ICD-10-CM

## 2024-10-29 PROCEDURE — PNV

## 2024-10-29 NOTE — PROGRESS NOTES
44 y.o.  female   Pt feeling good  + FM  Having discharge/increased but no color  No LOF  IOL scheduled for

## 2024-10-29 NOTE — PROGRESS NOTES
Patient is a 43 YO  female presenting to the office at 38w2d for routine OB care.   Patient is feeling well today.   NST/MILLIE tomorrow at MFM   IOL scheduled  7AM  Fetal heart rate: 150  BP: 118/72  TWlb 9.6oz  Fetal Movement: yes, good movement   LOF: no  VB: no  CTX: occasional Bowman Torres, especially in the evenings.   Reviewed precautions  Call for concerns  RTO 1 weeks

## 2024-10-30 ENCOUNTER — ULTRASOUND (OUTPATIENT)
Facility: HOSPITAL | Age: 44
End: 2024-10-30
Payer: COMMERCIAL

## 2024-10-30 VITALS
HEART RATE: 66 BPM | WEIGHT: 158.4 LBS | DIASTOLIC BLOOD PRESSURE: 62 MMHG | BODY MASS INDEX: 27.04 KG/M2 | SYSTOLIC BLOOD PRESSURE: 102 MMHG | HEIGHT: 64 IN

## 2024-10-30 DIAGNOSIS — O40.3XX0 POLYHYDRAMNIOS IN THIRD TRIMESTER COMPLICATION, SINGLE OR UNSPECIFIED FETUS: Primary | ICD-10-CM

## 2024-10-30 DIAGNOSIS — O09.523 AMA (ADVANCED MATERNAL AGE) MULTIGRAVIDA 35+, THIRD TRIMESTER: ICD-10-CM

## 2024-10-30 DIAGNOSIS — Z3A.38 38 WEEKS GESTATION OF PREGNANCY: ICD-10-CM

## 2024-10-30 PROCEDURE — 76818 FETAL BIOPHYS PROFILE W/NST: CPT | Performed by: OBSTETRICS & GYNECOLOGY

## 2024-10-30 NOTE — PROGRESS NOTES
Repeat Non-Stress Testing:    Patient verbalizes +FM. Pt denies ALL:               Leaking of fluid   Contractions   Vaginal bleeding   Decreased fetal movement    Patient is performing daily kick counts. Patient has no questions or concerns.   NST strip reviewed by Dr. Chow.

## 2024-11-01 ENCOUNTER — TELEPHONE (OUTPATIENT)
Age: 44
End: 2024-11-01

## 2024-11-01 NOTE — TELEPHONE ENCOUNTER
Patient called to follow-p on la paperwork for their spouse Rosey who works for [employer]. Paperwork due 11/2 but hr states they have not yet received it.    S/w Emi in office, Aaron is unavailable at time of call (lunch) but will review with her when she returns & reach out to pt with update. Pt agreeable.

## 2024-11-03 NOTE — PROGRESS NOTES
Please refer to the [facility] ultrasound report in Ob Procedures for additional information regarding today's visit

## 2024-11-05 ENCOUNTER — TELEPHONE (OUTPATIENT)
Age: 44
End: 2024-11-05

## 2024-11-05 NOTE — TELEPHONE ENCOUNTER
Pt called in wondering if she needs to keep her appointment for tomorrow. Advised it is a good idea to keep all scheduled OB visits. Pt states she would prefer to cancel as she already has a visit with MFM for NST and MILLIE tomorrow  and then she is being induced the following day. Advised will review with provider. Pt thankful.

## 2024-11-06 ENCOUNTER — ROUTINE PRENATAL (OUTPATIENT)
Dept: OBGYN CLINIC | Facility: CLINIC | Age: 44
End: 2024-11-06

## 2024-11-06 ENCOUNTER — ULTRASOUND (OUTPATIENT)
Facility: HOSPITAL | Age: 44
End: 2024-11-06
Payer: COMMERCIAL

## 2024-11-06 VITALS
BODY MASS INDEX: 27.28 KG/M2 | SYSTOLIC BLOOD PRESSURE: 104 MMHG | DIASTOLIC BLOOD PRESSURE: 62 MMHG | HEART RATE: 68 BPM | HEIGHT: 64 IN | WEIGHT: 159.8 LBS

## 2024-11-06 VITALS — WEIGHT: 159 LBS | SYSTOLIC BLOOD PRESSURE: 108 MMHG | BODY MASS INDEX: 27.29 KG/M2 | DIASTOLIC BLOOD PRESSURE: 76 MMHG

## 2024-11-06 DIAGNOSIS — Z3A.39 39 WEEKS GESTATION OF PREGNANCY: Primary | ICD-10-CM

## 2024-11-06 DIAGNOSIS — O09.813 PREGNANCY RESULTING FROM IN VITRO FERTILIZATION IN THIRD TRIMESTER: ICD-10-CM

## 2024-11-06 DIAGNOSIS — O09.899 SHORT INTERVAL BETWEEN PREGNANCIES AFFECTING PREGNANCY, ANTEPARTUM: ICD-10-CM

## 2024-11-06 DIAGNOSIS — O09.523 AMA (ADVANCED MATERNAL AGE) MULTIGRAVIDA 35+, THIRD TRIMESTER: ICD-10-CM

## 2024-11-06 DIAGNOSIS — O09.523 ELDERLY MULTIGRAVIDA, THIRD TRIMESTER: ICD-10-CM

## 2024-11-06 PROCEDURE — 76815 OB US LIMITED FETUS(S): CPT | Performed by: OBSTETRICS & GYNECOLOGY

## 2024-11-06 PROCEDURE — 59025 FETAL NON-STRESS TEST: CPT | Performed by: OBSTETRICS & GYNECOLOGY

## 2024-11-06 PROCEDURE — PNV: Performed by: PHYSICIAN ASSISTANT

## 2024-11-06 NOTE — PROGRESS NOTES
44 y.o.  female at 39w3d (Estimated Date of Delivery: 11/10/24) for PNV.    Pre- Vitals      Flowsheet Row Most Recent Value   Prenatal Assessment    Movement Present   Prenatal Vitals    Blood Pressure 108/76   Weight - Scale 72.1 kg (159 lb)   Urine Albumin/Glucose    Dilation/Effacement/Station    Vaginal Drainage    Edema    LLE Edema None          TW.888 kg (21 lb 12.8 oz)    Leakage of fluid: no  Vaginal bleeding: no  Contractions/Cramping: no  Fetal movement: yes  Pt is feeling great  Some pressure, no major cramping  Gbs is negative her iol is for tomorrow 7am    RTO in 1 weeks.

## 2024-11-06 NOTE — LETTER
November 6, 2024     Patricia Leon MD  [address]  [address]  [place] [address]    Patient: Becca Styles   YOB: 1980   Date of Visit: 11/6/2024       Dear Dr. Leon:    Thank you for referring Becca Styles to me for evaluation. Below are my notes for this consultation.    If you have questions, please do not hesitate to call me. I look forward to following your patient along with you.         Sincerely,        Jez Stone MD        CC: No Recipients    Jez Stone MD  11/3/2024  2:12 PM  Sign when Signing Visit  Please refer to the [facility] ultrasound report in Ob Procedures for additional information regarding today's visit

## 2024-11-06 NOTE — PROGRESS NOTES
Baby is moving    No nausea no vomiting     No headaches     No cramping, no BH    No abnormal loss of fluid or bleeding     No swelling    No cervix check     Urine was normal - Negative protein, Negative glucose

## 2024-11-07 ENCOUNTER — ANESTHESIA (INPATIENT)
Dept: ANESTHESIOLOGY | Facility: HOSPITAL | Age: 44
End: 2024-11-07
Payer: COMMERCIAL

## 2024-11-07 ENCOUNTER — HOSPITAL ENCOUNTER (INPATIENT)
Facility: HOSPITAL | Age: 44
LOS: 1 days | Discharge: HOME | End: 2024-11-08
Attending: OBSTETRICS & GYNECOLOGY | Admitting: OBSTETRICS & GYNECOLOGY
Payer: COMMERCIAL

## 2024-11-07 ENCOUNTER — HOSPITAL ENCOUNTER (OUTPATIENT)
Dept: LABOR AND DELIVERY | Facility: HOSPITAL | Age: 44
End: 2024-11-07
Payer: COMMERCIAL

## 2024-11-07 ENCOUNTER — ANESTHESIA EVENT (INPATIENT)
Dept: ANESTHESIOLOGY | Facility: HOSPITAL | Age: 44
End: 2024-11-07
Payer: COMMERCIAL

## 2024-11-07 PROBLEM — Z34.90 ENCOUNTER FOR ELECTIVE INDUCTION OF LABOR: Status: ACTIVE | Noted: 2024-11-07

## 2024-11-07 PROBLEM — Z3A.39 39 WEEKS GESTATION OF PREGNANCY: Status: ACTIVE | Noted: 2024-10-21

## 2024-11-07 PROBLEM — R10.2 PELVIC PRESSURE IN FEMALE: Status: RESOLVED | Noted: 2024-08-27 | Resolved: 2024-11-07

## 2024-11-07 LAB
ABO GROUP BLD: NORMAL
BASE EXCESS BLDCOA CALC-SCNC: -9.9 MMOL/L (ref 3–11)
BASE EXCESS BLDCOV CALC-SCNC: -7.8 MMOL/L (ref 1–9)
BLD GP AB SCN SERPL QL: NEGATIVE
ERYTHROCYTE [DISTWIDTH] IN BLOOD BY AUTOMATED COUNT: 13.1 % (ref 11.6–15.1)
HCO3 BLDCOA-SCNC: 18.2 MMOL/L (ref 17.3–27.3)
HCO3 BLDCOV-SCNC: 18.1 MMOL/L (ref 12.2–28.6)
HCT VFR BLD AUTO: 34.5 % (ref 34.8–46.1)
HGB BLD-MCNC: 11.4 G/DL (ref 11.5–15.4)
HOLD SPECIMEN: NORMAL
MCH RBC QN AUTO: 30.5 PG (ref 26.8–34.3)
MCHC RBC AUTO-ENTMCNC: 33 G/DL (ref 31.4–37.4)
MCV RBC AUTO: 92 FL (ref 82–98)
O2 CT VFR BLDCOA CALC: 8.7 ML/DL
OXYHGB MFR BLDCOA: 34.9 %
OXYHGB MFR BLDCOV: 47.3 %
PCO2 BLDCOA: 47.1 MM[HG] (ref 30–60)
PCO2 BLDCOV: 38.6 MM HG (ref 27–43)
PH BLDCOA: 7.2 [PH] (ref 7.23–7.43)
PH BLDCOV: 7.29 [PH] (ref 7.19–7.49)
PLATELET # BLD AUTO: 218 THOUSANDS/UL (ref 149–390)
PMV BLD AUTO: 10.7 FL (ref 8.9–12.7)
PO2 BLDCOA: 18.9 MM HG (ref 5–25)
PO2 BLDCOV: 20.5 MM HG (ref 15–45)
RBC # BLD AUTO: 3.74 MILLION/UL (ref 3.81–5.12)
RH BLD: POSITIVE
SAO2 % BLDCOV: 11.4 ML/DL
SPECIMEN EXPIRATION DATE: NORMAL
TREPONEMA PALLIDUM IGG+IGM AB [PRESENCE] IN SERUM OR PLASMA BY IMMUNOASSAY: NORMAL
WBC # BLD AUTO: 7.44 THOUSAND/UL (ref 4.31–10.16)

## 2024-11-07 PROCEDURE — 59400 OBSTETRICAL CARE: CPT | Performed by: OBSTETRICS & GYNECOLOGY

## 2024-11-07 PROCEDURE — 3E033VJ INTRODUCTION OF OTHER HORMONE INTO PERIPHERAL VEIN, PERCUTANEOUS APPROACH: ICD-10-PCS | Performed by: OBSTETRICS & GYNECOLOGY

## 2024-11-07 PROCEDURE — NC001 PR NO CHARGE: Performed by: OBSTETRICS & GYNECOLOGY

## 2024-11-07 PROCEDURE — 86900 BLOOD TYPING SEROLOGIC ABO: CPT

## 2024-11-07 PROCEDURE — 86850 RBC ANTIBODY SCREEN: CPT

## 2024-11-07 PROCEDURE — 10907ZC DRAINAGE OF AMNIOTIC FLUID, THERAPEUTIC FROM PRODUCTS OF CONCEPTION, VIA NATURAL OR ARTIFICIAL OPENING: ICD-10-PCS | Performed by: OBSTETRICS & GYNECOLOGY

## 2024-11-07 PROCEDURE — 86780 TREPONEMA PALLIDUM: CPT

## 2024-11-07 PROCEDURE — TCMXX: Performed by: FAMILY MEDICINE

## 2024-11-07 PROCEDURE — 82805 BLOOD GASES W/O2 SATURATION: CPT | Performed by: OBSTETRICS & GYNECOLOGY

## 2024-11-07 PROCEDURE — 86901 BLOOD TYPING SEROLOGIC RH(D): CPT

## 2024-11-07 PROCEDURE — 85027 COMPLETE CBC AUTOMATED: CPT

## 2024-11-07 RX ORDER — FENTANYL CITRATE 50 UG/ML
INJECTION, SOLUTION INTRAMUSCULAR; INTRAVENOUS AS NEEDED
Status: DISCONTINUED | OUTPATIENT
Start: 2024-11-07 | End: 2024-11-08 | Stop reason: HOSPADM

## 2024-11-07 RX ORDER — ACETAMINOPHEN 325 MG/1
650 TABLET ORAL EVERY 6 HOURS SCHEDULED
Status: DISCONTINUED | OUTPATIENT
Start: 2024-11-07 | End: 2024-11-09 | Stop reason: HOSPADM

## 2024-11-07 RX ORDER — OXYCODONE HYDROCHLORIDE 5 MG/1
5 TABLET ORAL EVERY 4 HOURS PRN
Status: DISCONTINUED | OUTPATIENT
Start: 2024-11-08 | End: 2024-11-09 | Stop reason: HOSPADM

## 2024-11-07 RX ORDER — ACETAMINOPHEN 325 MG/1
650 TABLET ORAL EVERY 6 HOURS SCHEDULED
Status: DISCONTINUED | OUTPATIENT
Start: 2024-11-07 | End: 2024-11-07

## 2024-11-07 RX ORDER — LIDOCAINE HYDROCHLORIDE AND EPINEPHRINE 15; 5 MG/ML; UG/ML
INJECTION, SOLUTION EPIDURAL
Status: COMPLETED | OUTPATIENT
Start: 2024-11-07 | End: 2024-11-07

## 2024-11-07 RX ORDER — IBUPROFEN 600 MG/1
600 TABLET, FILM COATED ORAL EVERY 6 HOURS
Status: DISCONTINUED | OUTPATIENT
Start: 2024-11-09 | End: 2024-11-08

## 2024-11-07 RX ORDER — OXYCODONE HYDROCHLORIDE 10 MG/1
10 TABLET ORAL EVERY 4 HOURS PRN
Status: DISCONTINUED | OUTPATIENT
Start: 2024-11-08 | End: 2024-11-09 | Stop reason: HOSPADM

## 2024-11-07 RX ORDER — SODIUM CHLORIDE, SODIUM LACTATE, POTASSIUM CHLORIDE, CALCIUM CHLORIDE 600; 310; 30; 20 MG/100ML; MG/100ML; MG/100ML; MG/100ML
125 INJECTION, SOLUTION INTRAVENOUS CONTINUOUS
Status: DISCONTINUED | OUTPATIENT
Start: 2024-11-07 | End: 2024-11-09 | Stop reason: HOSPADM

## 2024-11-07 RX ORDER — CALCIUM CARBONATE 500 MG/1
1000 TABLET, CHEWABLE ORAL 3 TIMES DAILY PRN
Status: DISCONTINUED | OUTPATIENT
Start: 2024-11-07 | End: 2024-11-09 | Stop reason: HOSPADM

## 2024-11-07 RX ORDER — KETOROLAC TROMETHAMINE 30 MG/ML
30 INJECTION, SOLUTION INTRAMUSCULAR; INTRAVENOUS EVERY 6 HOURS SCHEDULED
Status: DISCONTINUED | OUTPATIENT
Start: 2024-11-07 | End: 2024-11-08

## 2024-11-07 RX ORDER — ONDANSETRON 2 MG/ML
4 INJECTION INTRAMUSCULAR; INTRAVENOUS EVERY 6 HOURS PRN
Status: DISCONTINUED | OUTPATIENT
Start: 2024-11-07 | End: 2024-11-07 | Stop reason: SDUPTHER

## 2024-11-07 RX ORDER — BENZOCAINE/MENTHOL 6 MG-10 MG
1 LOZENGE MUCOUS MEMBRANE DAILY PRN
Status: DISCONTINUED | OUTPATIENT
Start: 2024-11-07 | End: 2024-11-09 | Stop reason: HOSPADM

## 2024-11-07 RX ORDER — OXYTOCIN/RINGER'S LACTATE 30/500 ML
1-30 PLASTIC BAG, INJECTION (ML) INTRAVENOUS
Status: DISCONTINUED | OUTPATIENT
Start: 2024-11-07 | End: 2024-11-07

## 2024-11-07 RX ORDER — DIPHENHYDRAMINE HYDROCHLORIDE 50 MG/ML
25 INJECTION INTRAMUSCULAR; INTRAVENOUS EVERY 6 HOURS PRN
Status: DISCONTINUED | OUTPATIENT
Start: 2024-11-07 | End: 2024-11-09 | Stop reason: HOSPADM

## 2024-11-07 RX ORDER — OXYTOCIN/RINGER'S LACTATE 30/500 ML
62.5 PLASTIC BAG, INJECTION (ML) INTRAVENOUS CONTINUOUS
Status: DISPENSED | OUTPATIENT
Start: 2024-11-07 | End: 2024-11-08

## 2024-11-07 RX ORDER — CALCIUM CARBONATE 500 MG/1
1000 TABLET, CHEWABLE ORAL 3 TIMES DAILY PRN
Status: DISCONTINUED | OUTPATIENT
Start: 2024-11-07 | End: 2024-11-07 | Stop reason: SDUPTHER

## 2024-11-07 RX ORDER — ACETAMINOPHEN 325 MG/1
975 TABLET ORAL EVERY 8 HOURS PRN
Status: DISCONTINUED | OUTPATIENT
Start: 2024-11-07 | End: 2024-11-07

## 2024-11-07 RX ORDER — DOCUSATE SODIUM 100 MG/1
100 CAPSULE, LIQUID FILLED ORAL 2 TIMES DAILY
Status: DISCONTINUED | OUTPATIENT
Start: 2024-11-07 | End: 2024-11-09 | Stop reason: HOSPADM

## 2024-11-07 RX ORDER — ONDANSETRON 2 MG/ML
4 INJECTION INTRAMUSCULAR; INTRAVENOUS EVERY 6 HOURS PRN
Status: DISCONTINUED | OUTPATIENT
Start: 2024-11-07 | End: 2024-11-09 | Stop reason: HOSPADM

## 2024-11-07 RX ORDER — BUPIVACAINE HYDROCHLORIDE 2.5 MG/ML
30 INJECTION, SOLUTION EPIDURAL; INFILTRATION; INTRACAUDAL ONCE AS NEEDED
Status: DISCONTINUED | OUTPATIENT
Start: 2024-11-07 | End: 2024-11-07

## 2024-11-07 RX ADMIN — OXYTOCIN 62.5 MILLI-UNITS/MIN: 10 INJECTION INTRAVENOUS at 19:17

## 2024-11-07 RX ADMIN — KETOROLAC TROMETHAMINE 30 MG: 30 INJECTION, SOLUTION INTRAMUSCULAR; INTRAVENOUS at 18:56

## 2024-11-07 RX ADMIN — FENTANYL CITRATE 100 MCG: 50 INJECTION INTRAMUSCULAR; INTRAVENOUS at 16:01

## 2024-11-07 RX ADMIN — LIDOCAINE HYDROCHLORIDE AND EPINEPHRINE 3 ML: 15; 5 INJECTION, SOLUTION EPIDURAL at 15:22

## 2024-11-07 RX ADMIN — Medication 1 TABLET: at 10:15

## 2024-11-07 RX ADMIN — WITCH HAZEL 1 PAD: 500 SOLUTION RECTAL; TOPICAL at 22:11

## 2024-11-07 RX ADMIN — DOCUSATE SODIUM 100 MG: 100 CAPSULE, LIQUID FILLED ORAL at 18:56

## 2024-11-07 RX ADMIN — OXYTOCIN 2 MILLI-UNITS/MIN: 10 INJECTION INTRAVENOUS at 10:46

## 2024-11-07 RX ADMIN — ACETAMINOPHEN 650 MG: 325 TABLET, FILM COATED ORAL at 18:56

## 2024-11-07 RX ADMIN — HYDROCORTISONE 1 APPLICATION: 1 CREAM TOPICAL at 22:11

## 2024-11-07 RX ADMIN — SODIUM CHLORIDE, SODIUM LACTATE, POTASSIUM CHLORIDE, AND CALCIUM CHLORIDE 125 ML/HR: .6; .31; .03; .02 INJECTION, SOLUTION INTRAVENOUS at 09:15

## 2024-11-07 RX ADMIN — ROPIVACAINE HYDROCHLORIDE: 2 INJECTION, SOLUTION EPIDURAL; INFILTRATION at 15:31

## 2024-11-07 RX ADMIN — BENZOCAINE AND LEVOMENTHOL 1 APPLICATION: 200; 5 SPRAY TOPICAL at 22:11

## 2024-11-07 RX ADMIN — LIDOCAINE HYDROCHLORIDE AND EPINEPHRINE 2 ML: 15; 5 INJECTION, SOLUTION EPIDURAL at 15:24

## 2024-11-07 NOTE — OB LABOR/OXYTOCIN SAFETY PROGRESS
Oxytocin Safety Progress Check Note - Becca Styles 44 y.o. female MRN: [id number]    Unit/Bed#: -01 Encounter: [id number]    Dose (gio-units/min) Oxytocin: 6 gio-units/min  Contraction Frequency (minutes): 2-5  Contraction Intensity: Mild  Uterine Activity Characteristics: Occasional  Cervical Dilation: 3-4        Cervical Effacement: 70  Fetal Station: -2  Baseline Rate (FHR): 150 bpm  Fetal Heart Rate (FHT): 132 BPM  FHR Category: 1  Oxytocin Safety Progress Check: Safety check completed            Vital Signs:   Vitals:    11/07/24 1144   BP: 127/78   Pulse: 69       Notes/comments:   Feeling some cramping  AROM - clear  Continue pitocin     Silvina Shrestha MD 11/7/2024 12:49 PM

## 2024-11-07 NOTE — OB LABOR/OXYTOCIN SAFETY PROGRESS
Oxytocin Safety Progress Check Note - Becca Styles 44 y.o. female MRN: [id number]    Unit/Bed#: -01 Encounter: [id number]    Dose (gio-units/min) Oxytocin: 4 gio-units/min  Contraction Frequency (minutes): 4-10  Contraction Intensity: Mild     Cervical Dilation: 3        Cervical Effacement: 80  Fetal Station: -2  Baseline Rate (FHR): 125 bpm  Fetal Heart Rate (FHT): 149 BPM  FHR Category: I               Vital Signs:   Vitals:    24 1045   BP: 118/72   Pulse: 68       Notes/comments:   Pt comfortable with contractions. No LOF, no VB, regular fetal movement.  deferred, on pitocin titration.     Lev Moctezuma MD 2024 11:39 AM

## 2024-11-07 NOTE — ANESTHESIA PROCEDURE NOTES
Epidural Block    Start time: 11/7/2024 3:18 PM  Reason for block: procedure for pain  Staffing  Performed by: Suraj Dickerson CRNA  Authorized by: Enoch Calhoun MD    Preanesthetic Checklist  Completed: patient identified, IV checked, site marked, risks and benefits discussed, surgical consent, monitors and equipment checked, pre-op evaluation and timeout performed  Epidural  Patient position: sitting  Prep: ChloraPrep  Sedation Level: no sedation  Patient monitoring: frequent blood pressure checks, continuous pulse oximetry and heart rate  Approach: midline  Location: lumbar, L3-4  Injection technique: KODY saline  Needle  Needle type: Tuohy   Needle gauge: 18 G  Needle insertion depth: 5 cm  Catheter type: multi-orifice  Catheter size: 20 G  Catheter at skin depth: 12 cm  Catheter securement method: stabilization device and clear occlusive dressing  Test dose: negativelidocaine-epinephrine (XYLOCAINE-MPF/EPINEPHRINE) 1.5 %-1:200,000 injection 3 mL - Epidural   3 mL - 11/7/2024 3:22:00 PM  Assessment  Sensory level: T10  Number of attempts: 1negative aspiration for CSF, negative aspiration for heme and no paresthesia on injection  patient tolerated the procedure well with no immediate complications  Additional Notes  Unremarkable epidural block

## 2024-11-07 NOTE — ASSESSMENT & PLAN NOTE
Continue routine post partum care  Encourage ambulation  Encourage breastfeeding  Contraception: Undecided  Anticipate discharge PPD 1 vs 2

## 2024-11-07 NOTE — PLAN OF CARE
Problem: BIRTH - VAGINAL/ SECTION  Goal: Fetal and maternal status remain reassuring during the birth process  Description: INTERVENTIONS:  - Monitor vital signs  - Monitor fetal heart rate  - Monitor uterine activity  - Monitor labor progression (vaginal delivery)  - DVT prophylaxis  - Antibiotic prophylaxis  Outcome: Progressing  Goal: Emotionally satisfying birthing experience for mother/fetus  Description: Interventions:  - Assess, plan, implement and evaluate the nursing care given to the patient in labor  - Advocate the philosophy that each childbirth experience is a unique experience and support the family's chosen level of involvement and control during the labor process   - Actively participate in both the patient's and family's teaching of the birth process  - Consider cultural, [employer] and age-specific factors and plan care for the patient in labor  Outcome: Progressing     Problem: PAIN - ADULT  Goal: Verbalizes/displays adequate comfort level or baseline comfort level  Description: Interventions:  - Encourage patient to monitor pain and request assistance  - Assess pain using appropriate pain scale  - Administer analgesics based on type and severity of pain and evaluate response  - Implement non-pharmacological measures as appropriate and evaluate response  - Consider cultural and social influences on pain and pain management  - Notify physician/advanced practitioner if interventions unsuccessful or patient reports new pain  Outcome: Progressing     Problem: INFECTION - ADULT  Goal: Absence or prevention of progression during hospitalization  Description: INTERVENTIONS:  - Assess and monitor for signs and symptoms of infection  - Monitor lab/diagnostic results  - Monitor all insertion sites, i.e. indwelling lines, tubes, and drains  - Monitor endotracheal if appropriate and nasal secretions for changes in amount and color  - [place] appropriate cooling/warming therapies per order  -  Administer medications as ordered  - Instruct and encourage patient and family to use good hand hygiene technique  - Identify and instruct in appropriate isolation precautions for identified infection/condition  Outcome: Progressing  Goal: Absence of fever/infection during neutropenic period  Description: INTERVENTIONS:  - Monitor WBC    Outcome: Progressing     Problem: SAFETY ADULT  Goal: Patient will remain free of falls  Description: INTERVENTIONS:  - Educate patient/family on patient safety including physical limitations  - Instruct patient to call for assistance with activity   - Consult OT/PT to assist with strengthening/mobility   - Keep Call bell within reach  - Keep bed low and locked with side rails adjusted as appropriate  - Keep care items and personal belongings within reach  - Initiate and maintain comfort rounds  - Make Fall Risk Sign visible to staff  -Outcome: Progressing  Goal: Maintain or return to baseline ADL function  Description: INTERVENTIONS:  -  Assess patient's ability to carry out ADLs; assess patient's baseline for ADL function and identify physical deficits which impact ability to perform ADLs (bathing, care of mouth/teeth, toileting, grooming, dressing, etc.)  - Assess/evaluate cause of self-care deficits   - Assess range of motion  - Assess patient's mobility; develop plan if impaired  - Assess patient's need for assistive devices and provide as appropriate  - Encourage maximum independence but intervene and supervise when necessary  - Involve family in performance of ADLs  - Assess for home care needs following discharge   - Consider OT consult to assist with ADL evaluation and planning for discharge  - Provide patient education as appropriate  Outcome: Progressing  Goal: Maintains/Returns to pre admission functional level  Description: INTERVENTIONS:  - Perform AM-PAC 6 Click Basic Mobility/ Daily Activity assessment daily.  - Set and communicate daily mobility goal to care team  and patient/family/caregiver.   - Collaborate with rehabilitation services on mobility goals if consulted  - Outcome: Progressing     Problem: Knowledge Deficit  Goal: Patient/family/caregiver demonstrates understanding of disease process, treatment plan, medications, and discharge instructions  Description: Complete learning assessment and assess knowledge base.  Interventions:  - Provide teaching at level of understanding  - Provide teaching via preferred learning methods  Outcome: Progressing     Problem: DISCHARGE PLANNING  Goal: Discharge to home or other facility with appropriate resources  Description: INTERVENTIONS:  - Identify barriers to discharge w/patient and caregiver  - Arrange for needed discharge resources and transportation as appropriate  - Identify discharge learning needs (meds, wound care, etc.)  - Arrange for interpretive services to assist at discharge as needed  - Refer to Case Management Department for coordinating discharge planning if the patient needs post-hospital services based on physician/advanced practitioner order or complex needs related to functional status, cognitive ability, or social support system  Outcome: Progressing

## 2024-11-07 NOTE — H&P
H & P- Obstetrics   Becca Styles 44 y.o. female MRN: [id number]  Unit/Bed#: -01 Encounter: [id number]      Assessment: 44 y.o.  at 39w4d admitted for eIOL.  SVE: 3/60/-2  FHT: 125 bpm Cat I moderate variability   Clinical EFW: 7 lbs 6 oz 89% - 37w2d  Vertex confirmed by US  GBS status: negative   Postpartum contraception plan: undetermined      Plan:   Admit  CBC, RPR, Type & Screen  Analgesia at maternal request  Expectant management   Antibiotics     Dr. Fady SHARP aware        SUBJECTIVE:    Chief Complaint:  IOL    HPI: Becca Styles is a 44 y.o.  with an MELISSA of 11/10/2024, by Embryo Transfer at 39w4d who is being admitted for IOL. She denies having uterine contractions, has no LOF, and reports no VB. She states she has felt good FM..    Pregnancy complications:     Patient Active Problem List   Diagnosis    Breast lump in female    LIVIER (generalized anxiety disorder)    Current mild episode of major depressive disorder without prior episode (HCC)    Decreased libido    Short interval between pregnancies affecting pregnancy, antepartum    AMA (advanced maternal age) multigravida 35+, third trimester    39 weeks gestation of pregnancy    Polyhydramnios in third trimester    Encounter for elective induction of labor       Baby complications/comments: none    Review of Systems   Constitutional:  Negative for chills and fever.   HENT:  Negative for ear pain and sore throat.    Eyes:  Negative for pain and visual disturbance.   Respiratory:  Negative for cough and shortness of breath.    Cardiovascular:  Negative for chest pain and palpitations.   Gastrointestinal:  Negative for abdominal pain and vomiting.   Genitourinary:  Negative for dysuria and hematuria.   Musculoskeletal:  Negative for arthralgias and back pain.   Skin:  Negative for color change and rash.   Neurological:  Negative for seizures and syncope.   All other systems reviewed and are negative.      OB History    Para Term   AB Living   4 1 1   2 1   SAB IAB Ectopic Multiple Live Births   2     0 1      # Outcome Date GA Lbr Jacky/2nd Weight Sex Type Anes PTL Lv   4 Current            3 Term 23 40w0d / 05:56 4010 g (8 lb 13.5 oz) M Vag-Vacuum EPI N KEILY   2 SAB 2021           1 SAB               Obstetric Comments   Menarche 13       Past Medical History:   Diagnosis Date    Anxiety     Female infertility     Miscarriage , ,     Pregnancy resulting from in vitro fertilization in third trimester 2022    Varicella     as child       Past Surgical History:   Procedure Laterality Date    WISDOM TOOTH EXTRACTION         Social History     Tobacco Use    Smoking status: Former     Current packs/day: 0.00     Types: Cigarettes     Quit date: 1999     Years since quittin.2    Smokeless tobacco: Never    Tobacco comments:     Used to smoke socially never consistently on a daily basis   Substance Use Topics    Alcohol use: Yes     Alcohol/week: 2.0 standard drinks of alcohol     Types: 2 Glasses of wine per week     Comment: social, none since pregnancy       No Known Allergies      Medications Prior to Admission:     Prenatal Vit-Fe Fumarate-FA (PRENATAL VITAMIN PO)    aspirin 81 mg chewable tablet        OBJECTIVE:  Vitals:  HR:  [68] 68  BP: (104)/(62) 104/62  There is no height or weight on file to calculate BMI.     Physical Exam:  Physical Exam  Cardiovascular:      Rate and Rhythm: Normal rate and regular rhythm.      Pulses: Normal pulses.      Heart sounds: Normal heart sounds.   Pulmonary:      Effort: Pulmonary effort is normal.      Breath sounds: Normal breath sounds.   Abdominal:      Comments: Gravid   Neurological:      General: No focal deficit present.      Mental Status: She is alert and oriented to person, place, and time. Mental status is at baseline.   Skin:     General: Skin is warm.      Capillary Refill: Capillary refill takes less than 2 seconds.   Psychiatric:         Mood  and Affect: Mood normal.         Behavior: Behavior normal.         Thought Content: Thought content normal.         Judgment: Judgment normal.          SVE:   3/60/-2    FHT:  Baseline Rate (FHR): 125 bpm  Variability: Moderate  Accelerations: 15 x 15 or greater, At variable times  Decelerations: None  FHR Category: Category I    TOCO:   Contraction Frequency (minutes): 4-10  Contraction Intensity: Mild    Lab Results   Component Value Date    WBC 7.44 11/07/2024    HGB 11.4 (L) 11/07/2024    HCT 34.5 (L) 11/07/2024     11/07/2024     Lab Results   Component Value Date    K 4.0 03/11/2023     03/11/2023    CO2 27 03/11/2023    BUN 11 03/11/2023    CREATININE 0.81 03/11/2023    AST 15 03/11/2023    ALT 19 03/11/2023       Prenatal Labs: Reviewed      Blood type: O+  Antibody: negative  Group B strep: negative  HIV: negative  Hepatitis B: negative  RPR: non-reactive  Rubella: Immune  Varicella: Unknown  1 hour Glucose: 141  3 hour glucose: 70  Platelets: 266  Hgb: 12.9    >2 Midnights  INPATIENT     Marija Serrano MD  FM PGY 1  11/7/2024  11:14 AM

## 2024-11-07 NOTE — DISCHARGE SUMMARY
Discharge Summary - OB/GYN   Name: Becca Styles 44 y.o. female I MRN: [id number]  Unit/Bed#: -01 I Date of Admission: 2024   Date of Service: 2024 I Hospital Day: 0    Obstetrics Discharge Summary  Becca Styles 44 y.o. female MRN: [id number]  Unit/Bed#: -01 Encounter: [id number]    Admission Date: 2024     Discharge Date: 2024    Admitting Attending: Dr. Shrestha  Delivery Attending: Dr. Shrestha  Discharging Attending: Dr. Rowan    Admitting Diagnoses:   1. Pregnancy at 39w4d   2. Polyhydramnios     Discharge Diagnoses:   1. Same as above  2. Delivery of term     Delivery  Route of Delivery: Vaginal, Spontaneous    Anesthesia: Epidural,   QBL: 177 ml    Delivery: Vaginal, Spontaneous at 2024 5:22 PM  Laceration: Perineal: 2nd degree  Repaired?  Yes    Baby's Weight:  ; 8lbs5.oz    Apgar scores: 8  and 9  at 1 and 5 minutes, respectively      Hospital course: Becca Styles is now a 44 y.o.  at 39w4d. She presented to labor and delivery for elective induction of labor. The induction started with pitocin, AROM cl was performed, pt received an epidural. She progressed to complete cervical dilation and began pushing. . She progressed to complete cervical dilation and began pushing.    Her post-delivery course was uncomplicated. Her postpartum pain was well controlled with oral analgesics. Maternal blood type is O positive so RhoGAM wasn't indicated.    On day of discharge, she was ambulating and able to reasonably perform all ADLs. She was voiding and had appropriate bowel function. Pain was well controlled. She was discharged home on postpartum day #1 without complications. Patient was instructed to follow up with her OBGYN as an outpatient and was given appropriate warnings to call provider if she develops signs of infection or uncontrolled pain.    Complications: none apparent    Condition at discharge: good     Provisions for Follow-Up Care:  Please see after visit  summary for information related to follow-up care and any pertinent home health orders.      Disposition: Home    Planned Readmission: No    Discharge Medications:   Please see AVS for a complete list of discharge medications.    Discharge instructions :   -Do not place anything (no partner, tampons or douche) in your vagina for 6 weeks  -You may walk for exercise for the first 6 weeks then gradually return to your usual activities   -Please do not drive for 1 week if you have no stitches and for 2 weeks if you have stitches    -You may take baths or shower per your preference   -Please examine your breasts in the mirror daily and call your doctor for redness or tenderness or increased warmth    -Please call your doctor's office if temperature > 100.4*F or 38* C, worsening pain or a foul discharge.    Janina Felton  PGY-1 OB/GYN  11/08/24  9:10 PM

## 2024-11-07 NOTE — L&D DELIVERY NOTE
OBGYN Vaginal Delivery Summary  Becca Styles 44 y.o. female MRN: [id number]  Unit/Bed#: -01 Encounter: [id number]    Predelivery Diagnosis:  1. Pregnancy at 39w4d   2 Polyhydramnios     Postdelivery Diagnosis:  1. Same as above  2. Delivery of term     Procedure: spontaneous vaginal delivery, repair of 2nd laceration(s)    Attending: Dr. Shrestha    Assistant: Dr. Lev Adamson    Anesthesia: Epidural    QBL: 177mL  Admission H.4 g/dL  Admission platelets: 218 thousands/uL    Complications: none apparent    Specimens: cord blood, arterial and venous cord blood gases, placenta to storage.    Findings:   1. Viable female at 1722, with APGARS of 8 and 9 at 1 and 5 minutes respectively. Weight pending at time of dictation.  2. Spontaneous delivery of intact placenta at 1750. Eccentrically  insertion three vessel umbilical cord  3. 2nd degree laceration repaired with 3-0 Vicryl rapide  4. Blood gases:  Umbilical Cord Venous Blood Gas:  Results from last 7 days   Lab Units 24  1727   PH COV  7.289   PCO2 COV mm HG 38.6   HCO3 COV mmol/L 18.1   BASE EXC COV mmol/L -7.8*   O2 CT CD VB mL/dL 11.4   O2 HGB, VENOUS CORD % 47.3     Umbilical Cord Arterial Blood Gas:  Results from last 7 days   Lab Units 24  1727   PH COA  7.204*   PCO2 COA  47.1   PO2 COA mm HG 18.9   HCO3 COA mmol/L 18.2   BASE EXC COA mmol/L -9.9*   O2 CONTENT CORD ART ml/dl 8.7   O2 HGB, ARTERIAL CORD % 34.9       Disposition:  Patient tolerated the procedure well and was recovering in labor and delivery room.    Brief history and labor course:  Becca Styles is a 44 y.o.  at 39wk4d. She presented to labor and delivery for elective induction of labor. The induction started with pitocin, AROM cl was performed, pt received an epidural. She progressed to complete cervical dilation and began pushing.     Description of procedure  After pushing for 49 minutes, the patient delivered a viable female  at 1722 on  2024, weight pending at time of dictation, apgars of 8 (1 min) and 9 (5 min). The fetal vertex delivered spontaneously. Baby restituted  to LOT. There was no nuchal cord. The anterior (right) shoulder delivered atraumatically with maternal expulsive forces and the assistance of gentle downward traction. The posterior shoulder delivered with maternal expulsive forces and the assistance of gentle upward traction. The remainder of the fetus delivered spontaneously.     Upon delivery the infant was placed on the mother's abdomen and delayed cord clamping was performed. The umbilical cord was then doubly clamped and cut. The infant was noted to cry spontaneously and was moving all extremities appropriately. There was no evidence for injury. Awaiting nurse resuscitators evaluated the . Arterial and venous cord blood gases and cord blood were collected for analysis and were promptly sent to the lab. In the immediate post-partum, IV pitocin was administered, and the uterus was noted to contract down well with massage and pitocin. The placenta delivered spontaneously at 1750 and was noted to be intact and had an eccentric inserted 3 vessel cord. The placenta was sent to storage.    The vagina, cervix, perineum, and rectum were inspected. 2nd laceration(s) were noted. Repair was completed with 3-0 Vicryl rapide.    At the conclusion of the procedure, all needle, sponge, and instrument counts were noted to be correct. Patient tolerated the procedure well and was allowed to recover in labor and delivery room with family and  before being transferred to the post-partum floor.     Dr. Shrestha was present and participated in all key portions of the case.    Lev Moctezuma MD  2024  6:06 PM

## 2024-11-07 NOTE — OB LABOR/OXYTOCIN SAFETY PROGRESS
Oxytocin Safety Progress Check Note - Becca Styles 44 y.o. female MRN: [id number]    Unit/Bed#: -01 Encounter: [id number]    Dose (gio-units/min) Oxytocin: 10 gio-units/min  Contraction Frequency (minutes): 2.5-4  Contraction Intensity: Moderate  Uterine Activity Characteristics: Occasional  Cervical Dilation: 4-5        Cervical Effacement: 80  Fetal Station: -2  Baseline Rate (FHR): 135 bpm  Fetal Heart Rate (FHT): 156 BPM  FHR Category: I  Oxytocin Safety Progress Check: Safety check completed            Vital Signs:   Vitals:    11/07/24 1442   BP: 122/79   Pulse: 63       Notes/comments:   Pt is starting to be uncomfortable with the contractions. Progressing in labor. Pt wants an epidural.     Lev Moctezuma MD 11/7/2024 2:57 PM

## 2024-11-07 NOTE — ANESTHESIA PREPROCEDURE EVALUATION
Procedure:  LABOR ANALGESIA    Relevant Problems   GYN   (+) 39 weeks gestation of pregnancy   (+) AMA (advanced maternal age) multigravida 35+, third trimester      NEURO/PSYCH   (+) Current mild episode of major depressive disorder without prior episode (HCC)   (+) LIVIER (generalized anxiety disorder)        Physical Exam    Airway      TM Distance: >3 FB  Neck ROM: full     Dental   No notable dental hx     Cardiovascular  Rhythm: regular, Rate: normal, Cardiovascular exam normal    Pulmonary  Pulmonary exam normal Breath sounds clear to auscultation    Other Findings  post-pubertal.      Anesthesia Plan  ASA Score- 2     Anesthesia Type- epidural with ASA Monitors.         Additional Monitors:     Airway Plan:     Comment: Discussed the risks/benefits of neuraxial anesthesia, including risk of bleeding/infection/damage to underlying structures, the likely side effect of nausea, and unlikely complication of spinal headache..       Plan Factors-Exercise tolerance (METS): >4 METS.    Chart reviewed.   Existing labs reviewed. Patient summary reviewed.    Patient is not a current smoker.  Patient instructed to abstain from smoking on day of procedure. Patient did not smoke on day of surgery.            Induction-     Postoperative Plan-     Perioperative Resuscitation Plan - Level 1 - Full Code.       Informed Consent- Anesthetic plan and risks discussed with patient.  I personally reviewed this patient with the CRNA. Discussed and agreed on the Anesthesia Plan with the CRNA..

## 2024-11-08 VITALS
TEMPERATURE: 98.2 F | HEART RATE: 67 BPM | SYSTOLIC BLOOD PRESSURE: 121 MMHG | RESPIRATION RATE: 20 BRPM | OXYGEN SATURATION: 99 % | HEIGHT: 64 IN | BODY MASS INDEX: 26.98 KG/M2 | WEIGHT: 158 LBS | DIASTOLIC BLOOD PRESSURE: 66 MMHG

## 2024-11-08 LAB
ANION GAP SERPL CALCULATED.3IONS-SCNC: 6 MMOL/L (ref 4–13)
BUN SERPL-MCNC: 11 MG/DL (ref 5–25)
CALCIUM SERPL-MCNC: 7.5 MG/DL (ref 8.4–10.2)
CHLORIDE SERPL-SCNC: 108 MMOL/L (ref 96–108)
CO2 SERPL-SCNC: 21 MMOL/L (ref 21–32)
CREAT SERPL-MCNC: 0.52 MG/DL (ref 0.6–1.3)
ERYTHROCYTE [DISTWIDTH] IN BLOOD BY AUTOMATED COUNT: 12.9 % (ref 11.6–15.1)
GFR SERPL CREATININE-BSD FRML MDRD: 117 ML/MIN/1.73SQ M
GLUCOSE SERPL-MCNC: 102 MG/DL (ref 65–140)
HCT VFR BLD AUTO: 29.4 % (ref 34.8–46.1)
HGB BLD-MCNC: 9.7 G/DL (ref 11.5–15.4)
MCH RBC QN AUTO: 30.3 PG (ref 26.8–34.3)
MCHC RBC AUTO-ENTMCNC: 33 G/DL (ref 31.4–37.4)
MCV RBC AUTO: 92 FL (ref 82–98)
PLATELET # BLD AUTO: 183 THOUSANDS/UL (ref 149–390)
PMV BLD AUTO: 10.9 FL (ref 8.9–12.7)
POTASSIUM SERPL-SCNC: 3.3 MMOL/L (ref 3.5–5.3)
RBC # BLD AUTO: 3.2 MILLION/UL (ref 3.81–5.12)
SODIUM SERPL-SCNC: 135 MMOL/L (ref 135–147)
WBC # BLD AUTO: 11.02 THOUSAND/UL (ref 4.31–10.16)

## 2024-11-08 PROCEDURE — 99024 POSTOP FOLLOW-UP VISIT: CPT | Performed by: OBSTETRICS & GYNECOLOGY

## 2024-11-08 PROCEDURE — 80048 BASIC METABOLIC PNL TOTAL CA: CPT

## 2024-11-08 PROCEDURE — 0KQM0ZZ REPAIR PERINEUM MUSCLE, OPEN APPROACH: ICD-10-PCS | Performed by: OBSTETRICS & GYNECOLOGY

## 2024-11-08 PROCEDURE — 85027 COMPLETE CBC AUTOMATED: CPT

## 2024-11-08 PROCEDURE — 4A1HXCZ MONITORING OF PRODUCTS OF CONCEPTION, CARDIAC RATE, EXTERNAL APPROACH: ICD-10-PCS | Performed by: OBSTETRICS & GYNECOLOGY

## 2024-11-08 RX ORDER — BENZOCAINE/MENTHOL 6 MG-10 MG
1 LOZENGE MUCOUS MEMBRANE DAILY PRN
Start: 2024-11-08

## 2024-11-08 RX ORDER — POTASSIUM CHLORIDE 1500 MG/1
40 TABLET, EXTENDED RELEASE ORAL ONCE
Status: DISCONTINUED | OUTPATIENT
Start: 2024-11-08 | End: 2024-11-09 | Stop reason: HOSPADM

## 2024-11-08 RX ORDER — IBUPROFEN 600 MG/1
600 TABLET, FILM COATED ORAL EVERY 6 HOURS
Status: DISCONTINUED | OUTPATIENT
Start: 2024-11-09 | End: 2024-11-09 | Stop reason: HOSPADM

## 2024-11-08 RX ORDER — KETOROLAC TROMETHAMINE 30 MG/ML
30 INJECTION, SOLUTION INTRAMUSCULAR; INTRAVENOUS EVERY 6 HOURS SCHEDULED
Status: DISCONTINUED | OUTPATIENT
Start: 2024-11-08 | End: 2024-11-09 | Stop reason: HOSPADM

## 2024-11-08 RX ORDER — DIPHENHYDRAMINE HYDROCHLORIDE 50 MG/ML
25 INJECTION INTRAMUSCULAR; INTRAVENOUS EVERY 6 HOURS PRN
Start: 2024-11-08

## 2024-11-08 RX ORDER — ACETAMINOPHEN 325 MG/1
650 TABLET ORAL EVERY 6 HOURS SCHEDULED
Qty: 16 TABLET | Refills: 0 | Status: SHIPPED | OUTPATIENT
Start: 2024-11-08 | End: 2024-11-10

## 2024-11-08 RX ORDER — POTASSIUM CHLORIDE 1500 MG/1
20 TABLET, EXTENDED RELEASE ORAL ONCE
Status: COMPLETED | OUTPATIENT
Start: 2024-11-08 | End: 2024-11-08

## 2024-11-08 RX ORDER — DOCUSATE SODIUM 100 MG/1
100 CAPSULE, LIQUID FILLED ORAL 2 TIMES DAILY
Start: 2024-11-08

## 2024-11-08 RX ORDER — IBUPROFEN 600 MG/1
600 TABLET, FILM COATED ORAL EVERY 6 HOURS
Qty: 30 TABLET | Refills: 0 | Status: CANCELLED | OUTPATIENT
Start: 2024-11-09

## 2024-11-08 RX ORDER — ACETAMINOPHEN 325 MG/1
650 TABLET ORAL EVERY 6 HOURS SCHEDULED
Qty: 20 TABLET | Refills: 0 | Status: CANCELLED | OUTPATIENT
Start: 2024-11-08 | End: 2024-11-11

## 2024-11-08 RX ADMIN — DOCUSATE SODIUM 100 MG: 100 CAPSULE, LIQUID FILLED ORAL at 09:18

## 2024-11-08 RX ADMIN — Medication 1 TABLET: at 09:18

## 2024-11-08 RX ADMIN — ACETAMINOPHEN 650 MG: 325 TABLET, FILM COATED ORAL at 09:18

## 2024-11-08 RX ADMIN — ACETAMINOPHEN 650 MG: 325 TABLET, FILM COATED ORAL at 16:45

## 2024-11-08 RX ADMIN — DOCUSATE SODIUM 100 MG: 100 CAPSULE, LIQUID FILLED ORAL at 17:55

## 2024-11-08 RX ADMIN — KETOROLAC TROMETHAMINE 30 MG: 30 INJECTION, SOLUTION INTRAMUSCULAR; INTRAVENOUS at 01:20

## 2024-11-08 RX ADMIN — SODIUM CHLORIDE, SODIUM LACTATE, POTASSIUM CHLORIDE, AND CALCIUM CHLORIDE 999 ML/HR: .6; .31; .03; .02 INJECTION, SOLUTION INTRAVENOUS at 04:05

## 2024-11-08 RX ADMIN — POTASSIUM CHLORIDE 20 MEQ: 1500 TABLET, EXTENDED RELEASE ORAL at 12:50

## 2024-11-08 RX ADMIN — SODIUM CHLORIDE, SODIUM LACTATE, POTASSIUM CHLORIDE, AND CALCIUM CHLORIDE 125 ML/HR: .6; .31; .03; .02 INJECTION, SOLUTION INTRAVENOUS at 00:02

## 2024-11-08 RX ADMIN — ACETAMINOPHEN 650 MG: 325 TABLET, FILM COATED ORAL at 03:51

## 2024-11-08 NOTE — LACTATION NOTE
This note was copied from a baby's chart.  CONSULT - LACTATION  Baby Girl (Dhachip) Jensen 1 days female MRN: [id number]    [facility] NURSERY Room / Bed: (N)/(N) Encounter: [id number]    Maternal Information     MOTHER:  Estella Styles  Maternal Age: 44 y.o.  OB History: # 1 - Date: None, Sex: None, Weight: None, GA: None, Type: None, Apgar1: None, Apgar5: None, Living: None, Birth Comments: None    # 2 - Date: 2021, Sex: None, Weight: None, GA: None, Type: None, Apgar1: None, Apgar5: None, Living: None, Birth Comments: None    # 3 - Date: 23, Sex: Male, Weight: 4010 g (8 lb 13.5 oz), GA: 40w0d, Type: Vaginal, Vacuum (Extractor), Apgar1: 7, Apgar5: 9, Living: Living, Birth Comments: None    # 4 - Date: 24, Sex: Female, Weight: 3795 g (8 lb 5.9 oz), GA: 39w4d, Type: Vaginal, Spontaneous, Apgar1: 8, Apgar5: 9, Living: Living, Birth Comments: None   Previouse breast reduction surgery? No    Lactation history:   Has patient previously breast fed: Yes   How long had patient previously breast fed: 11 month   Previous breast feeding complications: Other (Comment) (TT of)     Past Surgical History:   Procedure Laterality Date    WISDOM TOOTH EXTRACTION         Birth information:  YOB: 2023   Time of birth: 5:22 PM   Sex: female   Delivery type: Vaginal, Spontaneous   Birth Weight: 3795 g (8 lb 5.9 oz)   Percent of Weight Change: 0%     Gestational Age: 39w4d   [unfilled]    Assessment     Breast and nipple assessment:  small, firm breasts with small, dark areolas and round, everted nipples - short shank of R nipple noted with dimpled center and blister on the nipple face.    Maiden Assessment:  Upon oral assessment:                   Mouth:  upper lip blister; tongue has heart-shaped tip; tongue does not elevate,  bilateral movement to mid-anterior; extension is to lower alveolar ridge. Upon digital suck exam, attempts peristalic movement, some  cupping noted. Once oral muscles demonstrate fatigue, compression on the pinky tip to hold in place. Demonstration and teach back of snug hold of baby's oral muscles to assist with more coordinated movement.           Feeding assessment: latch difficulty (due to oral restrictive movement. Set up with feeding plan)        24 1825 24 1700   LATCH Documentation   Latch 0 2   Audible Swallowing 0 2   Type of Nipple 0 2   Comfort (Breast/Nipple) 2 1   Hold (Positioning) 2 1   LATCH Score 4 8   Having latch problems? Yes Yes   Position(s) Used Cradle Football       Feeding recommendations:  breast feed on demand. This is mom's second child. Mom wants to breastfeed. First child had an oral restriction and once fixed, first baby breast fed for 11 months.    Ed. On how to establish a deeper latch.     Ed. On wet wound care    Ed. On feeding plan for home    Mom has a spectra 9 from last pregnancy. Ed. On ordering a new pump with every pregnancy. [employer] pamphlet provided for mom to pick a pump within the next year. Mom uses smaller flanges. Provided sample of 19 mm flanges from [employer].     RSB/DC and handouts reviewed  Handouts:   Tongue Tie  Nipple Pain,   Increase Supply,  How to Cycle Hospital Pump,  Alt. Feeding,  Hand expression,    [place] Latch After Lactation Education/Consult:  Efficiency: Football on the Right breast              Lips Flanged: Yes, with baby looking up to the breast, chin placed behind the areola and compression between the shoulder blades to assist in wide oral gape.              Depth of latch: deeper with demonstration of cheeks touching the breast, nipple to nose. Once baby opens wide, Bring baby to the breast, not breast to baby.               Audible Swallow: Yes, with audible swallows and some clicking noises              Visible Milk: Yes, with HE              Wide Open/ Asymmetrical: Yes, but after a few min. Visible muscle fatigue with fluttering of oral muscle and lower  mandible comes up and compresses the base of the nipple. Demonstration and teach-back of how to tug the lower chin to recreate the wider latch.               Suck Swallow Cycle: Breathing: yes, Coordinated: yes for 7-15 sucks, then smaller oral gape noted until chin tug assists with wider gape.  Nipple Assessment after latch: blister on the Right nipple face. Wet wound care demonstrated with teach back.  Latch Problems: oral restrictive movement noted with  feeding plan and discussion of DBM and pumping. Msg sent to Baby and me to assess for oral restriction    Position After Lactation Education/Consult:  Infant's Ergonomics/Body               Body Alignment: Yes, with ear, shoulder, hip alignment and belly to belly               Head Supported: Yes, with snug hold of the baby and compression between the shoulder blades. Ed. On lower mandible support and benefits of NNS               Close to Mom's body/ Lifted/ Supported: Yes, with mom turning away from the baby to align the nipple to nose               Mom's Ergonomics/Body: Yes, with lower lumbar support                           Supported: Yes                           Sitting Back: Yes                           Brings Baby to her breast: Yes, with reminders  Positioning Problems: alignment, belly to belly, compression between the shoulder blades and u shape hold of the breast. Snug hold of baby at the breast.       Feeding Plan     1. Meet early feeding cues  2. Use manual pump, latch assist, Reverse Pressure Softening and nipple rolling techniques to elongate the nipple prior to the latch. May use lanolin inside flange.   3. Use massage, warmth, hand expression to stimulate breasts  4. Use pillows to bring baby to the breast (shoulders back, lower back support)  5. Bring baby to breast skin to skin  6. Have baby's chest against mom's torso. Baby's chin should be deeply into the breast (usually behind the areola), and nose should touch the nipple. This position  "will  assist with deeper latch  7. Place opposite hand under the breast and grab the breast like a taco. Your thumb should be in front of the baby's nose and behind the areola, under the baby's chin. Move baby not breast, and bring baby to breast when mouth is wide and deep latch is achieved. May use compression in front of baby's nose and under the baby's chin to compress the breast and assist with encouraging active sucking at the breast.   9. Use breast compressions to stimulate suck  10. Once muscle fatigue is noted, unlatch baby and use alternative feeding techniques.   11. Offer the other breast and allow Raghav to \"hang\" on the breast to assist in creating milk supply  12. Use multi-user pump and hand pump with 19 mm flanges to express colostrum for the next feeding.   13. Always feed your expressed milk first. DBM information.    Education on creating a snug hold of your infant to the breast by verifying the infant's cheek is touching the breast, your infant's chin is deep into the breast tissue, your infant's arms are \"hugging\" the breast, and your infant's lips are flanged on the areola. Bring infant to the breast, not your breast to the infant. Latch should feel like a tugging sensation on the nipple.    Education on s2s for feedings. Encouraged hand expression prior to latch. Education on baby's body alignment; belly to belly with mom; ear, shoulder hip alignment, long neck, chin / cheek touching cheek. Reviewed how baby can breathe at the breast. Tugging sensation, no pinching/pain.    To help your nipples heal, in addition to paying close attention to latch and positioning, moist would healing is recommended. Apply organic coconut or olive oil, or lanolin/nipple cream to the nipple and cover with a small piece of wax/parcment paper. Remove before feeding and apply new cream and paper after each feeding. apply protective ointment after feeding or pumping and cover with an occlusive dressing.    To assist " with deep latch to the breast with a visible recessed chin,have baby's chin pressed deeply into the breast tissue. Use compression with hand between the shoulder blades to aid in active, coordinated jaw movement.     All babies are born to breastfeed due to upturned nose and flared nostrils. Mom will always see tip of nose. Babies will unlatch when they can't breathe.         (Scan QR code for [employer] - positions)   Review [employer] on youtube or scan QR code for [employer] video      [employer]        [employer] - positions          Christy Hume, MA 11/8/2024 5:53 PM

## 2024-11-08 NOTE — PROGRESS NOTES
"Progress Note - OB/GYN  Becca Styles 44 y.o. female MRN: [id number]  Unit/Bed#:  321-01 Encounter: [id number]    Assessment and Plan     Becca Styles is a patient of: [facility]. She is PPD# 1 s/p  spontaneous vaginal delivery  Recovering well and is stable       *  (spontaneous vaginal delivery)  Assessment & Plan  Continue routine post partum care  Encourage ambulation  Encourage breastfeeding  Contraception: Undecided  Anticipate discharge PPD 1 vs 2         Disposition    - Anticipate discharge home on PPD# 2      Subjective/Objective     Chief Complaint: Postpartum State     Subjective:    Becca Styles is PPD/POD#1 s/p  spontaneous vaginal delivery. She has no current complaints.  Pain is well controlled.  Patient is currently voiding via griffith tube (placed due to being unable to void) and has had >800 mL clear yellow output overnight.  She is ambulating.  Patient is currently passing flatus and has had no bowel movement. She is tolerating PO, and denies nausea or vomitting. Patient denies fever, chills, chest pain, shortness of breath, or calf tenderness. Lochia is normal. She is  Breastfeeding. She is recovering well and is stable.       Vitals:   /73   Pulse (!) 48   Temp 98 °F (36.7 °C) (Oral)   Resp 18   Ht 5' 4\" (1.626 m)   Wt 71.7 kg (158 lb)   LMP 2024   SpO2 98%   Breastfeeding Yes   BMI 27.12 kg/m²       Intake/Output Summary (Last 24 hours) at 2024 0618  Last data filed at 2024 0350  Gross per 24 hour   Intake --   Output 427 ml   Net -427 ml       Invasive Devices       Peripheral Intravenous Line  Duration             Peripheral IV 24 Left;Ventral (anterior) Forearm <1 day              Drain  Duration             Urethral Catheter Non-latex 16 Fr. <1 day                    Physical Exam:   GEN: Becca Styles appears well, alert and oriented x 3, pleasant and cooperative   CARDIO: RRR, no murmurs or rubs  RESP:  CTAB, no wheezes or " We do not recommend long term use of high potency steroid cream.  We usually use clobetasol for 6 mos max.  Pt needs to have f/o as she had lichen schlerosis which if left untreated can worsen and lead to vulvar cancer.  After this appointment we can do less frequent maintenance if appearance is improved    rales  ABDOMEN: soft, no tenderness, no distention, fundus @ 2cm below umbilicus,   EXTREMITIES: SCDs on, non tender, no erythema, b/l Baldo's sign negative      Labs:     Hemoglobin   Date Value Ref Range Status   11/07/2024 11.4 (L) 11.5 - 15.4 g/dL Final   08/19/2024 10.8 (L) 11.5 - 15.4 g/dL Final     WBC   Date Value Ref Range Status   11/07/2024 7.44 4.31 - 10.16 Thousand/uL Final   08/19/2024 7.66 4.31 - 10.16 Thousand/uL Final     Platelets   Date Value Ref Range Status   11/07/2024 218 149 - 390 Thousands/uL Final   08/19/2024 243 149 - 390 Thousands/uL Final     Creatinine   Date Value Ref Range Status   03/11/2023 0.81 0.60 - 1.30 mg/dL Final     Comment:     Standardized to IDMS reference method   07/29/2020 0.91 0.60 - 1.30 mg/dL Final     Comment:     Standardized to IDMS reference method     AST   Date Value Ref Range Status   03/11/2023 15 13 - 39 U/L Final     Comment:     Specimen collection should occur prior to Sulfasalazine administration due to the potential for falsely depressed results.    07/29/2020 10 5 - 45 U/L Final     Comment:       Specimen collection should occur prior to Sulfasalazine administration due to the potential for falsely depressed results.      ALT   Date Value Ref Range Status   03/11/2023 19 7 - 52 U/L Final     Comment:     Specimen collection should occur prior to Sulfasalazine administration due to the potential for falsely depressed results.    07/29/2020 21 12 - 78 U/L Final     Comment:       Specimen collection should occur prior to Sulfasalazine administration due to the potential for falsely depressed results.           Marija Serrano MD  11/8/2024  6:18 AM

## 2024-11-08 NOTE — PLAN OF CARE
Problem: PAIN - ADULT  Goal: Verbalizes/displays adequate comfort level or baseline comfort level  Description: Interventions:  - Encourage patient to monitor pain and request assistance  - Assess pain using appropriate pain scale  - Administer analgesics based on type and severity of pain and evaluate response  - Implement non-pharmacological measures as appropriate and evaluate response  - Consider cultural and social influences on pain and pain management  - Notify physician/advanced practitioner if interventions unsuccessful or patient reports new pain  Outcome: Progressing     Problem: INFECTION - ADULT  Goal: Absence or prevention of progression during hospitalization  Description: INTERVENTIONS:  - Assess and monitor for signs and symptoms of infection  - Monitor lab/diagnostic results  - Monitor all insertion sites, i.e. indwelling lines, tubes, and drains  - Monitor endotracheal if appropriate and nasal secretions for changes in amount and color  - [place] appropriate cooling/warming therapies per order  - Administer medications as ordered  - Instruct and encourage patient and family to use good hand hygiene technique  - Identify and instruct in appropriate isolation precautions for identified infection/condition  Outcome: Progressing  Goal: Absence of fever/infection during neutropenic period  Description: INTERVENTIONS:  - Monitor WBC    Outcome: Progressing     Problem: SAFETY ADULT  Goal: Patient will remain free of falls  Description: INTERVENTIONS:  - Educate patient/family on patient safety including physical limitations  - Instruct patient to call for assistance with activity   - Consult OT/PT to assist with strengthening/mobility   - Keep Call bell within reach  - Keep bed low and locked with side rails adjusted as appropriate  - Keep care items and personal belongings within reach  - Initiate and maintain comfort rounds  - Make Fall Risk Sign visible to staff  - Apply yellow socks and bracelet  for high fall risk patients  - Consider moving patient to room near nurses station  Outcome: Progressing  Goal: Maintain or return to baseline ADL function  Description: INTERVENTIONS:  -  Assess patient's ability to carry out ADLs; assess patient's baseline for ADL function and identify physical deficits which impact ability to perform ADLs (bathing, care of mouth/teeth, toileting, grooming, dressing, etc.)  - Assess/evaluate cause of self-care deficits   - Assess range of motion  - Assess patient's mobility; develop plan if impaired  - Assess patient's need for assistive devices and provide as appropriate  - Encourage maximum independence but intervene and supervise when necessary  - Involve family in performance of ADLs  - Assess for home care needs following discharge   - Consider OT consult to assist with ADL evaluation and planning for discharge  - Provide patient education as appropriate  Outcome: Progressing  Goal: Maintains/Returns to pre admission functional level  Description: INTERVENTIONS:  - Perform AM-PAC 6 Click Basic Mobility/ Daily Activity assessment daily.  - Set and communicate daily mobility goal to care team and patient/family/caregiver.   - Collaborate with rehabilitation services on mobility goals if consulted  - Out of bed for toileting  - Record patient progress and toleration of activity level   Outcome: Progressing     Problem: Knowledge Deficit  Goal: Patient/family/caregiver demonstrates understanding of disease process, treatment plan, medications, and discharge instructions  Description: Complete learning assessment and assess knowledge base.  Interventions:  - Provide teaching at level of understanding  - Provide teaching via preferred learning methods  Outcome: Progressing     Problem: DISCHARGE PLANNING  Goal: Discharge to home or other facility with appropriate resources  Description: INTERVENTIONS:  - Identify barriers to discharge w/patient and caregiver  - Arrange for needed  discharge resources and transportation as appropriate  - Identify discharge learning needs (meds, wound care, etc.)  - Arrange for interpretive services to assist at discharge as needed  - Refer to Case Management Department for coordinating discharge planning if the patient needs post-hospital services based on physician/advanced practitioner order or complex needs related to functional status, cognitive ability, or social support system  Outcome: Progressing     Problem: POSTPARTUM  Goal: Experiences normal postpartum course  Description: INTERVENTIONS:  - Monitor maternal vital signs  - Assess uterine involution and lochia  Outcome: Progressing  Goal: Appropriate maternal -  bonding  Description: INTERVENTIONS:  - Identify family support  - Assess for appropriate maternal/infant bonding   -Encourage maternal/infant bonding opportunities  - Referral to  or  as needed  Outcome: Progressing  Goal: Establishment of infant feeding pattern  Description: INTERVENTIONS:  - Assess breast/bottle feeding  - Refer to lactation as needed  Outcome: Progressing  Goal: Incision(s), wounds(s) or drain site(s) healing without S/S of infection  Description: INTERVENTIONS  - Assess and document dressing, incision, wound bed, drain sites and surrounding tissue  - Provide patient and family education  Outcome: Progressing

## 2024-11-08 NOTE — ANESTHESIA POSTPROCEDURE EVALUATION
Post-Op Assessment Note    CV Status:  Stable  Pain Score: 0    Pain management: adequate      Post-op block assessment: site cleaned, catheter intact and no complications   Mental Status:  Awake   Hydration Status:  Stable   PONV Controlled:  None   Airway Patency:  Patent     Post Op Vitals Reviewed: Yes    No anethesia notable event occurred.    Staff: Anesthesiologist           Last Filed PACU Vitals:  Vitals Value Taken Time   Temp     Pulse     BP     Resp     SpO2         Modified Maryam:  No data recorded

## 2024-11-08 NOTE — PLAN OF CARE
Problem: PAIN - ADULT  Goal: Verbalizes/displays adequate comfort level or baseline comfort level  Description: Interventions:  - Encourage patient to monitor pain and request assistance  - Assess pain using appropriate pain scale  - Administer analgesics based on type and severity of pain and evaluate response  - Implement non-pharmacological measures as appropriate and evaluate response  - Consider cultural and social influences on pain and pain management  - Notify physician/advanced practitioner if interventions unsuccessful or patient reports new pain  Outcome: Progressing     Problem: INFECTION - ADULT  Goal: Absence or prevention of progression during hospitalization  Description: INTERVENTIONS:  - Assess and monitor for signs and symptoms of infection  - Monitor lab/diagnostic results  - Monitor all insertion sites, i.e. indwelling lines, tubes, and drains  - Monitor endotracheal if appropriate and nasal secretions for changes in amount and color  - [place] appropriate cooling/warming therapies per order  - Administer medications as ordered  - Instruct and encourage patient and family to use good hand hygiene technique  - Identify and instruct in appropriate isolation precautions for identified infection/condition  Outcome: Progressing  Goal: Absence of fever/infection during neutropenic period  Description: INTERVENTIONS:  - Monitor WBC    Outcome: Progressing     Problem: SAFETY ADULT  Goal: Patient will remain free of falls  Description: INTERVENTIONS:  - Educate patient/family on patient safety including physical limitations  - Instruct patient to call for assistance with activity   - Consult OT/PT to assist with strengthening/mobility   - Keep Call bell within reach  - Keep bed low and locked with side rails adjusted as appropriate  - Keep care items and personal belongings within reach  - Initiate and maintain comfort rounds  - Make Fall Risk Sign visible to staff  - Apply yellow socks and bracelet  for high fall risk patients  - Consider moving patient to room near nurses station  Outcome: Progressing  Goal: Maintain or return to baseline ADL function  Description: INTERVENTIONS:  -  Assess patient's ability to carry out ADLs; assess patient's baseline for ADL function and identify physical deficits which impact ability to perform ADLs (bathing, care of mouth/teeth, toileting, grooming, dressing, etc.)  - Assess/evaluate cause of self-care deficits   - Assess range of motion  - Assess patient's mobility; develop plan if impaired  - Assess patient's need for assistive devices and provide as appropriate  - Encourage maximum independence but intervene and supervise when necessary  - Involve family in performance of ADLs  - Assess for home care needs following discharge   - Consider OT consult to assist with ADL evaluation and planning for discharge  - Provide patient education as appropriate  Outcome: Progressing  Goal: Maintains/Returns to pre admission functional level  Description: INTERVENTIONS:  - Perform AM-PAC 6 Click Basic Mobility/ Daily Activity assessment daily.  - Set and communicate daily mobility goal to care team and patient/family/caregiver.   - Collaborate with rehabilitation services on mobility goals if consulted  - Out of bed for toileting  - Record patient progress and toleration of activity level   Outcome: Progressing     Problem: Knowledge Deficit  Goal: Patient/family/caregiver demonstrates understanding of disease process, treatment plan, medications, and discharge instructions  Description: Complete learning assessment and assess knowledge base.  Interventions:  - Provide teaching at level of understanding  - Provide teaching via preferred learning methods  Outcome: Progressing     Problem: DISCHARGE PLANNING  Goal: Discharge to home or other facility with appropriate resources  Description: INTERVENTIONS:  - Identify barriers to discharge w/patient and caregiver  - Arrange for needed  discharge resources and transportation as appropriate  - Identify discharge learning needs (meds, wound care, etc.)  - Arrange for interpretive services to assist at discharge as needed  - Refer to Case Management Department for coordinating discharge planning if the patient needs post-hospital services based on physician/advanced practitioner order or complex needs related to functional status, cognitive ability, or social support system  Outcome: Progressing

## 2024-11-08 NOTE — PLAN OF CARE
Problem: PAIN - ADULT  Goal: Verbalizes/displays adequate comfort level or baseline comfort level  Description: Interventions:  - Encourage patient to monitor pain and request assistance  - Assess pain using appropriate pain scale  - Administer analgesics based on type and severity of pain and evaluate response  - Implement non-pharmacological measures as appropriate and evaluate response  - Consider cultural and social influences on pain and pain management  - Notify physician/advanced practitioner if interventions unsuccessful or patient reports new pain  Outcome: Progressing     Problem: INFECTION - ADULT  Goal: Absence or prevention of progression during hospitalization  Description: INTERVENTIONS:  - Assess and monitor for signs and symptoms of infection  - Monitor lab/diagnostic results  - Monitor all insertion sites, i.e. indwelling lines, tubes, and drains  - Monitor endotracheal if appropriate and nasal secretions for changes in amount and color  - [place] appropriate cooling/warming therapies per order  - Administer medications as ordered  - Instruct and encourage patient and family to use good hand hygiene technique  - Identify and instruct in appropriate isolation precautions for identified infection/condition  Outcome: Progressing  Goal: Absence of fever/infection during neutropenic period  Description: INTERVENTIONS:  - Monitor WBC    Outcome: Progressing     Problem: SAFETY ADULT  Goal: Patient will remain free of falls  Description: INTERVENTIONS:  - Educate patient/family on patient safety including physical limitations  - Instruct patient to call for assistance with activity   - Consult OT/PT to assist with strengthening/mobility   - Keep Call bell within reach  - Keep bed low and locked with side rails adjusted as appropriate  - Keep care items and personal belongings within reach  - Initiate and maintain comfort rounds  - Apply yellow socks and bracelet for high fall risk patients  - Consider  moving patient to room near nurses station  Outcome: Progressing  Goal: Maintain or return to baseline ADL function  Description: INTERVENTIONS:  -  Assess patient's ability to carry out ADLs; assess patient's baseline for ADL function and identify physical deficits which impact ability to perform ADLs (bathing, care of mouth/teeth, toileting, grooming, dressing, etc.)  - Assess/evaluate cause of self-care deficits   - Assess range of motion  - Assess patient's mobility; develop plan if impaired  - Assess patient's need for assistive devices and provide as appropriate  - Encourage maximum independence but intervene and supervise when necessary  - Involve family in performance of ADLs  - Assess for home care needs following discharge   - Consider OT consult to assist with ADL evaluation and planning for discharge  - Provide patient education as appropriate  Outcome: Progressing  Goal: Maintains/Returns to pre admission functional level  Description: INTERVENTIONS:  - Perform AM-PAC 6 Click Basic Mobility/ Daily Activity assessment daily.  - Set and communicate daily mobility goal to care team and patient/family/caregiver.   - Collaborate with rehabilitation services on mobility goals if consulted  - Out of bed for toileting  - Record patient progress and toleration of activity level   Outcome: Progressing     Problem: Knowledge Deficit  Goal: Patient/family/caregiver demonstrates understanding of disease process, treatment plan, medications, and discharge instructions  Description: Complete learning assessment and assess knowledge base.  Interventions:  - Provide teaching at level of understanding  - Provide teaching via preferred learning methods  Outcome: Progressing     Problem: DISCHARGE PLANNING  Goal: Discharge to home or other facility with appropriate resources  Description: INTERVENTIONS:  - Identify barriers to discharge w/patient and caregiver  - Arrange for needed discharge resources and transportation as  appropriate  - Identify discharge learning needs (meds, wound care, etc.)  - Arrange for interpretive services to assist at discharge as needed  - Refer to Case Management Department for coordinating discharge planning if the patient needs post-hospital services based on physician/advanced practitioner order or complex needs related to functional status, cognitive ability, or social support system  Outcome: Progressing     Problem: POSTPARTUM  Goal: Experiences normal postpartum course  Description: INTERVENTIONS:  - Monitor maternal vital signs  - Assess uterine involution and lochia  Outcome: Progressing  Goal: Appropriate maternal -  bonding  Description: INTERVENTIONS:  - Identify family support  - Assess for appropriate maternal/infant bonding   -Encourage maternal/infant bonding opportunities  - Referral to  or  as needed  Outcome: Progressing  Goal: Establishment of infant feeding pattern  Description: INTERVENTIONS:  - Assess breast/bottle feeding  - Refer to lactation as needed  Outcome: Progressing  Goal: Incision(s), wounds(s) or drain site(s) healing without S/S of infection  Description: INTERVENTIONS  - Assess and document dressing, incision, wound bed, drain sites and surrounding tissue  - Provide patient and family education  Outcome: Progressing

## 2024-11-09 NOTE — PLAN OF CARE
Problem: PAIN - ADULT  Goal: Verbalizes/displays adequate comfort level or baseline comfort level  Description: Interventions:  - Encourage patient to monitor pain and request assistance  - Assess pain using appropriate pain scale  - Administer analgesics based on type and severity of pain and evaluate response  - Implement non-pharmacological measures as appropriate and evaluate response  - Consider cultural and social influences on pain and pain management  - Notify physician/advanced practitioner if interventions unsuccessful or patient reports new pain  Outcome: Completed     Problem: INFECTION - ADULT  Goal: Absence or prevention of progression during hospitalization  Description: INTERVENTIONS:  - Assess and monitor for signs and symptoms of infection  - Monitor lab/diagnostic results  - Monitor all insertion sites, i.e. indwelling lines, tubes, and drains  - Monitor endotracheal if appropriate and nasal secretions for changes in amount and color  - [place] appropriate cooling/warming therapies per order  - Administer medications as ordered  - Instruct and encourage patient and family to use good hand hygiene technique  - Identify and instruct in appropriate isolation precautions for identified infection/condition  Outcome: Completed  Goal: Absence of fever/infection during neutropenic period  Description: INTERVENTIONS:  - Monitor WBC    Outcome: Completed     Problem: SAFETY ADULT  Goal: Patient will remain free of falls  Description: INTERVENTIONS:  - Educate patient/family on patient safety including physical limitations  - Instruct patient to call for assistance with activity   - Consult OT/PT to assist with strengthening/mobility   - Keep Call bell within reach  - Keep bed low and locked with side rails adjusted as appropriate  - Keep care items and personal belongings within reach  - Initiate and maintain comfort rounds  - Make Fall Risk Sign visible to staff  - Offer Toileting every Hours, in  advance of need  - Initiate/Maintain alarm  - Obtain necessary fall risk management equipment:   - Apply yellow socks and bracelet for high fall risk patients  - Consider moving patient to room near nurses station  Outcome: Completed  Goal: Maintain or return to baseline ADL function  Description: INTERVENTIONS:  -  Assess patient's ability to carry out ADLs; assess patient's baseline for ADL function and identify physical deficits which impact ability to perform ADLs (bathing, care of mouth/teeth, toileting, grooming, dressing, etc.)  - Assess/evaluate cause of self-care deficits   - Assess range of motion  - Assess patient's mobility; develop plan if impaired  - Assess patient's need for assistive devices and provide as appropriate  - Encourage maximum independence but intervene and supervise when necessary  - Involve family in performance of ADLs  - Assess for home care needs following discharge   - Consider OT consult to assist with ADL evaluation and planning for discharge  - Provide patient education as appropriate  Outcome: Completed  Goal: Maintains/Returns to pre admission functional level  Description: INTERVENTIONS:  - Perform AM-PAC 6 Click Basic Mobility/ Daily Activity assessment daily.  - Set and communicate daily mobility goal to care team and patient/family/caregiver.   - Collaborate with rehabilitation services on mobility goals if consulted  - Perform Range of Motion  times a day.  - Reposition patient every  hours.  - Dangle patient  times a day  - Stand patient  times a day  - Ambulate patient  times a day  - Out of bed to chair  times a day   - Out of bed for meals  times a day  - Out of bed for toileting  - Record patient progress and toleration of activity level   Outcome: Completed     Problem: Knowledge Deficit  Goal: Patient/family/caregiver demonstrates understanding of disease process, treatment plan, medications, and discharge instructions  Description: Complete learning assessment and  assess knowledge base.  Interventions:  - Provide teaching at level of understanding  - Provide teaching via preferred learning methods  Outcome: Completed     Problem: DISCHARGE PLANNING  Goal: Discharge to home or other facility with appropriate resources  Description: INTERVENTIONS:  - Identify barriers to discharge w/patient and caregiver  - Arrange for needed discharge resources and transportation as appropriate  - Identify discharge learning needs (meds, wound care, etc.)  - Arrange for interpretive services to assist at discharge as needed  - Refer to Case Management Department for coordinating discharge planning if the patient needs post-hospital services based on physician/advanced practitioner order or complex needs related to functional status, cognitive ability, or social support system  Outcome: Completed     Problem: POSTPARTUM  Goal: Experiences normal postpartum course  Description: INTERVENTIONS:  - Monitor maternal vital signs  - Assess uterine involution and lochia  Outcome: Completed  Goal: Appropriate maternal -  bonding  Description: INTERVENTIONS:  - Identify family support  - Assess for appropriate maternal/infant bonding   -Encourage maternal/infant bonding opportunities  - Referral to  or  as needed  Outcome: Completed  Goal: Establishment of infant feeding pattern  Description: INTERVENTIONS:  - Assess breast/bottle feeding  - Refer to lactation as needed  Outcome: Completed  Goal: Incision(s), wounds(s) or drain site(s) healing without S/S of infection  Description: INTERVENTIONS  - Assess and document dressing, incision, wound bed, drain sites and surrounding tissue  - Provide patient and family education  - Perform skin care/dressing changes every   Outcome: Completed

## 2024-11-11 NOTE — UTILIZATION REVIEW
"Mother and baby discharged on 2024    NOTIFICATION OF INPATIENT ADMISSION   MATERNITY/DELIVERY AUTHORIZATION REQUEST   SERVICING FACILITY:   [facility] - L&D, , NICU  [address] [place] [address]  Tax ID: [id number]  NPI: [id number]   ATTENDING PROVIDER:  Attending Name and NPI#: Silvina Shrestha Md [[id number]]  Address: [address] [place] [address]  Phone: [phone]   ADMISSION INFORMATION:  Place of Service: Inpatient [facility]  Place of Service Code: 21  Inpatient Admission Date/Time: 24  7:23 AM  Discharge Date/Time: 2024 10:10 PM  Admitting Diagnosis Code/Description:  39 weeks gestation of pregnancy [Z3A.39]  Encounter for full-term uncomplicated delivery [O80]     Mother: Becca Styles 1980 Estimated Date of Delivery: 11/10/24  Delivering clinician: Silvina Shrestha   OB History          4    Para   2    Term   2            AB   2    Living   2         SAB   2    IAB        Ectopic        Multiple   0    Live Births   2           Obstetric Comments   Menarche 13              Name & MRN:   Information for the patient's :  Raghav Styles [[id number]]   [place] Delivery Information:  Sex: female  Delivered 2024 5:22 PM by Vaginal, Spontaneous; Gestational Age: 39w4d     Measurements:  Weight: 8 lb 5.9 oz (3795 g);  Height: 21\"    APGAR 1 minute 5 minutes 10 minutes   Totals: 8 9       UTILIZATION REVIEW CONTACT:  Marii Hartmann Utilization   Network Utilization Review Department  Phone: [phone]  Fax [phone]  Email: [email or web address]  Contact for approvals/pending authorizations, clinical reviews, and discharge.     PHYSICIAN ADVISORY SERVICES:  Medical Necessity Denial & [facility] Review  Phone: [phone]  Fax: [phone]  Email: [email or web address]     DISCHARGE SUPPORT TEAM:  For Patients " Discharge Needs & Updates  Phone: [phone] opt. 2 Fax: [phone]  Email: [email or web address]

## 2024-11-14 LAB — PLACENTA IN STORAGE: NORMAL

## 2024-12-03 ENCOUNTER — OFFICE VISIT (OUTPATIENT)
Dept: POSTPARTUM | Facility: CLINIC | Age: 44
End: 2024-12-03
Payer: COMMERCIAL

## 2024-12-03 PROCEDURE — 99205 OFFICE O/P NEW HI 60 MIN: CPT | Performed by: PEDIATRICS

## 2024-12-03 NOTE — PROGRESS NOTES
INITIAL BREAST FEEDING EVALUATION    Informant/Relationship: Estella and Rosey/moms    Discussion of General Lactation Issues: Hospital noted immediately that Raghav was tongue tied. Attachment has been painful from the very beginning. Attachment is worse on the right with cracking and bleeding that has persisted on the right, though Estella had it on both sides. Attachment is 9/10. Rosey states that Estella screams when Raghav attaches. Raghav will attach to anything and takes the bottle well. Raghav does not make funny noises at the breast or bottle.    Infant is 26 days old today.        History:  Fertility Problem:yes - gestational mother had early menopause; biological mother provided the eggs with a donor sperm; IVF  Breast changes:yes - got christina, darker areolas and larger nipples  : yes - induction at term for AMA   Full term:yes - 39.4   labor:no  First nursing/attempt < 1 hour after birth:yes - tried, but didn't seem interested in the first 24 hours; attached better at home  Skin to skin following delivery:yes - immediately  Breast changes after delivery:yes - 3-4 days; felt christina and heavier, tingly and hard, had some leaking  Rooming in (infant in room with mother with exception of procedures, eg. Circumcision: yes - never left  Blood sugar issues:no  NICU stay:no  Jaundice:no  Phototherapy:no  Supplement given: (list supplement and method used as well as reason(s):no    Past Medical History:   Diagnosis Date    Anxiety     Female infertility     Miscarriage , ,     Pregnancy resulting from in vitro fertilization in third trimester 2022    Varicella     as child         Current Outpatient Medications:     benzocaine-menthol-lanolin-aloe (DERMOPLAST) 20-0.5 % topical spray, Apply 1 Application topically every 6 (six) hours as needed for mild pain or irritation, Disp: , Rfl:     diphenhydrAMINE (BENADRYL) 50 mg/mL, Inject 0.5 mL (25 mg total) into a catheter in a vein  "every 6 (six) hours as needed for itching or allergies, Disp: , Rfl:     docusate sodium (COLACE) 100 mg capsule, Take 1 capsule (100 mg total) by mouth 2 (two) times a day, Disp: , Rfl:     hydrocortisone 1 % cream, Apply 1 Application topically daily as needed for rash or irritation, Disp: , Rfl:     Prenatal Vit-Fe Fumarate-FA (PRENATAL VITAMIN PO), Take by mouth, Disp: , Rfl:     witch hazel-glycerin (TUCKS) topical pad, Apply 1 Pad topically every 4 (four) hours as needed for irritation, Disp: , Rfl:     No Known Allergies    Social History     Substance and Sexual Activity   Drug Use Not Currently       Social History     Interval Breastfeeding History:    Frequency of breast feeding: about every other feeding; eats about every 2-3 hours  Does mother feel breastfeeding is effective: If no, explain: not on the right; but the left does feel \"emptied\"  Does infant appear satisfied after nursing:If no, explain: not always  Stooling pattern normal: lYes  Urinating frequently:Yes  Using shield or shells: No    Alternative/Artificial Feedings:   Bottle: Yes, for about half of her feeds, using a Lashonda bottle with a 1 nipple, keeping the nipple full  Cup: No  Syringe/Finger: No           Formula Type: Earth's Best                     Amount: 2-3 oz in each bottle of formula            Breast Milk:                      Amount: 2-3 oz in each bottle of breast milk            Frequency Q 4-6 Hr between feedings about 1/2 of her feedings (about every other bottle is breast milk v formula)  Elimination Problems: Yes      Equipment:  Nipple Shield             Type: n/a             Size: n/a             Frequency of Use: n/a  Pump            Type: [place] Go and Spectra S9            Frequency of Use: whenever she gets a bottle, collects about 1.5-4 oz  Shells            Type: n/a            Frequency of use: n/a    Equipment Problems: yes Not getting suction with the [place] Go    Mom:  Breast: Small, wide spaced, normal  Nipple " Assessment in General: Normal: elongated/eraser, no discoloration; slight inversion in the center of each nipple with a small crack at the medial edge of the inversion on the face of the right nipple.  Mother's Awareness of Feeding Cues                 Recognizes: Yes                  Verbalizes: Yes  Support System: MOB and HARVEY  History of Breastfeeding:  older brother for almost 1 year until restarting fertility to conceive again. Started with difficulty due to his tongue tie; used informal milk sharing to meet his needs  Changes/Stressors/Violence: Breastfeeding is painful  Concerns/Goals: Estella wishes to breastfeed without pain    Problems with Mom: cracked nipple    Physical Exam  Constitutional:       Appearance: Normal appearance. She is well-developed and normal weight.   HENT:      Head: Normocephalic and atraumatic.   Eyes:      Extraocular Movements: Extraocular movements intact.   Neck:      Thyroid: No thyromegaly.   Cardiovascular:      Rate and Rhythm: Normal rate and regular rhythm.      Pulses: Normal pulses.      Heart sounds: Normal heart sounds. No murmur heard.  Pulmonary:      Effort: Pulmonary effort is normal.      Breath sounds: Normal breath sounds.   Musculoskeletal:         General: No swelling or tenderness. Normal range of motion.      Cervical back: Normal range of motion and neck supple.      Right lower leg: No edema.      Left lower leg: No edema.   Lymphadenopathy:      Cervical: No cervical adenopathy.      Upper Body:      Right upper body: No pectoral adenopathy.      Left upper body: No pectoral adenopathy.   Neurological:      General: No focal deficit present.      Mental Status: She is alert and oriented to person, place, and time.   Psychiatric:         Mood and Affect: Mood normal.         Behavior: Behavior normal.         Thought Content: Thought content normal.         Judgment: Judgment normal.   Vitals and nursing note reviewed.         Infant:  Behaviors:  Sleeping but easily aroused when undressed  Color: Healthy  Birth weight: 3.795 kg  Current weight: 3.92 kg    Problems with infant: Tongue tie      General Appearance:  Alert, active, no distress                            Head:  Normocephalic, AFOF, sutures opposed                            Eyes:   Conjunctiva clear, no drainage                            Ears:   Normally placed, no anomolies                           Nose:   Septum intact, no drainage or erythema                          Mouth:  No lesions; tongue remains primarily flat when crying with slight curling of the edges leading to a slight bowl formation; tongue extends just over the inferior alveolar ridge and lateralizes with some limitations bilaterally; extension and lateralization are characterized by dimpling in the tip and increased prominence of median raphe; there is good cupping of the examiner's finger but the seal is easily broken with traction placed on the mandible; there is no peristalsis; the tongue moves back and forth in a piston-like manner; the lips purse to help hold the examiner's finger; passive lift of the tongue reveals insertion of the frenulum to the tongue leaving only 1/4 of the tongue blade free with a cord noted extending to the tip of the tongue and 2 mm below the crest of the inferior alveolar ridge                   Neck:  Supple, symmetrical, trachea midline, no adenopathy; thyroid: no enlargement, symmetric, no tenderness/mass/nodules                Respiratory:  No grunting, flaring, retractions, breath sounds clear and equal           Cardiovascular:  Regular rate and rhythm. No murmur. Adequate perfusion/capillary refill. Femoral pulse present                  Abdomen:    Soft, non-tender, no masses, bowel sounds present, no HSM            Genitourinary:  Normal female genitalia, anus patent                         Spine:   No abnormalities noted       Musculoskeletal:   Full range of motion          Skin/Hair/Nails:   Skin warm, dry, and intact, no rashes or abnormal dyspigmentation or lesions               Neurologic:   No abnormal movement, tone appropriate for gestational age    [place] Latch:  Efficiency:               Lips Flanged: Yes, after frenotomy              Depth of latch: Excellent after frenotomy              Audible Swallow: Yes, sustained SSB after frenotomy              Visible Milk: Yes, after frenotomy              Wide Open/ Asymmetrical: Yes, after frenotomy              Suck Swallow Cycle: Breathing: Unlabored, Coordinated: Yes  Nipple Assessment after latch: Normal: elongated/eraser, no discoloration and no damage noted.  Latch Problems: After the frenotomy, Delia easily assists Raghav to a wider, deeper, more asymmetrical attachment on the right breast which is still painful, but seems less so. Raghav takes a few minutes to calm but then she settles to a sustained SSB and breast fed at both breasts until fully content.     Position:  Infant's Ergonomics/Body               Body Alignment: Yes               Head Supported: Yes               Close to Mom's body/ Lifted/ Supported: Yes               Mom's Ergonomics/Body: Yes                           Supported: Yes                           Sitting Back: Yes                           Brings Baby to her breast: Yes  Positioning Problems: None        Education:  Reviewed Latch: Reviewed how to gently compress the breast as if offering a sandwich to facilitate a deeper latch.    Reviewed Positioning for Dyad: Reviewed how to bring baby to the breast so that her lower lip and chin touch the breast with her nose just above the nipple to encourage a wider, more asymmetric latch.   Reviewed Frequency/Supply & Demand: Recommended feeding on demand: when the baby gives hunger cues, when the breasts feel full, every 3 hours during the day and every 5 hours at night counting from the beginning of one feeding to the beginning of the next; whichever comes  first.    Reviewed Alternative/Artificial Feedings: Paced bottle feeding  Reviewed Mom/Breast care: Apply ointment to the nipple and cover with a non-stick product; express whenever Raghav is fed by bottle and as needed for comfort.         Plan:  Discussed history and physical exams with Becca. Support given for her commitment to providing breast milk for her baby. Discussed the findings on the baby's exam consistent with tongue tie and reviewed how this may be the cause of nipple trauma, nipple pain, nipple damage, poor milk transfer, blocked ducts, mastitis, and loss of milk production. Discussed the science that supports performing the frenotomy to improve latch.   Recommended applying ointment to the damaged nipples and covering with a non-stick pad or parchment or wax paper to facilitate healing. Express breast milk as needed for comfort and any time Raghav is fed by bottle.     I have spent 60 minutes with Patient and family today in which greater than 50% of this time was spent in counseling/coordination of care regarding Prognosis, Risks and benefits of tx options, Instructions for management, Patient and family education, Importance of tx compliance, Risk factor reductions, Impressions, Counseling / Coordination of care, Documenting in the medical record, Reviewing / ordering tests, medicine, procedures  , and Obtaining or reviewing history  .

## 2024-12-03 NOTE — PATIENT INSTRUCTIONS
"Apply ointment of your choice to your nipples after breastfeeding or pumping and cover with parchment or wax paper.    Express breast milk whenever Raghav is fed by bottle and as needed, for comfort.     Additional milk expression early in the day immediately after breastfeeding is a great time to build a \"safety net\" for planned or unplanned separations.   "

## 2024-12-04 ENCOUNTER — TELEPHONE (OUTPATIENT)
Dept: OBGYN CLINIC | Facility: CLINIC | Age: 44
End: 2024-12-04

## 2024-12-10 ENCOUNTER — OFFICE VISIT (OUTPATIENT)
Dept: POSTPARTUM | Facility: CLINIC | Age: 44
End: 2024-12-10
Payer: COMMERCIAL

## 2024-12-10 ENCOUNTER — POSTPARTUM VISIT (OUTPATIENT)
Dept: OBGYN CLINIC | Facility: CLINIC | Age: 44
End: 2024-12-10

## 2024-12-10 VITALS
DIASTOLIC BLOOD PRESSURE: 68 MMHG | BODY MASS INDEX: 25.1 KG/M2 | SYSTOLIC BLOOD PRESSURE: 108 MMHG | WEIGHT: 147 LBS | HEIGHT: 64 IN

## 2024-12-10 DIAGNOSIS — O92.79 PAIN AGGRAVATED BY BREAST FEEDING: ICD-10-CM

## 2024-12-10 DIAGNOSIS — O92.13 CRACKED NIPPLE ASSOCIATED WITH LACTATION: ICD-10-CM

## 2024-12-10 DIAGNOSIS — R52 PAIN AGGRAVATED BY BREAST FEEDING: ICD-10-CM

## 2024-12-10 PROCEDURE — 99024 POSTOP FOLLOW-UP VISIT: CPT | Performed by: OBSTETRICS & GYNECOLOGY

## 2024-12-10 PROCEDURE — 99404 PREV MED CNSL INDIV APPRX 60: CPT | Performed by: PEDIATRICS

## 2024-12-10 NOTE — PROGRESS NOTES
"BREAST FEEDING FOLLOW UP VISIT    Informant/Relationship: Estella    Discussion of General Lactation Issues: Estella and Raghav are here for follow up after Raghav's frenotomy. Estella feels that Raghav is emptying the breast more effectively since the procedure but feedings are still painful. The pain radiates from the nipple into the entire breasts and persists through the entire feeding.  This symptom also occurs initially with pumping but resolves as the pumping session continues. This symptom is more noticeable on the right side.    Infant is 4 weeks old today.    Interval Breastfeeding History:  Frequency of breast feeding: Since the frenotomy, attempting breastfeeding at every feeding.  Every 1.5-3 hours.    Does mother feel breastfeeding is effective: Yes, but latch is painful  Does infant appear satisfied after nursing: Estella is not sure  Stooling pattern normal:Yes  Urinating frequently:Yes  Using shield or shells:No    Alternative/Artificial Feedings:   Bottle: Yes, recently only if Raghav still seems hungry after nursing.  About twice a day.  Using a Lashonda bottle with paced feeding technique.  She collapses the bottle nipple as she feeds.  Cup: No  Syringe/Finger: No           Formula Type: Earth's Best                     Amount: 2 ounces as needed for supplement            Breast Milk:                      Amount: currently whatever she transfers while feeding at the breast            Frequency Q 1.5-3 Hr between feedings  Elimination Problems: No      Equipment:  Nipple Shield             Type: none             Size: n/a             Frequency of Use: n/a  Pump            Type: [place] Go and Spectra S9            Frequency of Use: only occasionally as needed.  Shells            Type: none            Frequency of use: n/a    Equipment Problems: no      Mom:  Breast: Small symmetrical breasts.  Rounded shape.  Closely spaced.  Firm and full.  Both breasts slightly sore and \"achey\".  No erythema, firm areas or " engorgement noted.  Nipple Assessment in General: both nipples are dimpled but iva easily with stimulation. There is a deep crack on the face of the right nipple.  Mother's Awareness of Feeding Cues                 Recognizes: Estella is confused by Raghav's cues at times.  She is unsure if she's still hungry or just gassy                  Verbalizes: as above  Support System: MO and MIL  History of Breastfeeding:  her older child for a year after a difficult start due to baby's tongue tie  Changes/Stressors/Violence: Estella is concerned that breastfeeding is still painful.  Concerns/Goals: Estella desires to breastfeed without pain    Problems with Mom: attachment associated pain and nipple damage    Physical Exam  Constitutional:       Appearance: Normal appearance.   HENT:      Head: Normocephalic and atraumatic.      Nose: Nose normal.   Pulmonary:      Effort: Pulmonary effort is normal.   Musculoskeletal:         General: Normal range of motion.      Cervical back: Normal range of motion and neck supple.   Neurological:      Mental Status: She is alert and oriented to person, place, and time.   Skin:     General: Skin is warm and dry.   Psychiatric:         Mood and Affect: Mood normal.         Behavior: Behavior normal.         Thought Content: Thought content normal.         Judgment: Judgment normal.         Infant:  Behaviors: Alert  Color: Normal  Birth weight: 3795 grams  Current weight: 4105 grams    Problems with infant: tongue tie s/p frenotomy      General Appearance:  Alert, active, no distress                            Head:  Normocephalic, AFOF, sutures opposed                            Eyes:   Conjunctiva clear, no drainage                            Ears:   Normally placed, no anomolies                           Nose:   No drainage or erythema                          Mouth:  No lesions. The tongue extends just barely to the lower lip, tips completely on it's side when lateralizing.   The tip of the tongue lifts to near the palate without restriction.  Some effective cupping felt as Raghav sucked on my finger but the tip of the tongue periodically lost contact with my finger.  Very little peristalsis was felt as she sucked.  The tongue just slid back and forth across my finger.  The frenotomy wound is healing well.                   Neck:  Supple, symmetrical, trachea midline                Respiratory:  No grunting, flaring, retractions, breath sounds clear and equal           Cardiovascular:  Regular rate and rhythm. No murmur. Adequate perfusion/capillary refill.                   Abdomen:    Soft, non-tender, no masses, bowel sounds present, no HSM            Genitourinary:  Normal female genitalia, anus patent                         Spine:   No abnormalities noted       Musculoskeletal:   Full range of motion. There is a palpable lump on the left clavicle near the shoulder joint.         Skin/Hair/Nails:   Skin warm, dry, and intact, no rashes or abnormal dyspigmentation or lesions               Neurologic:   No abnormal movement, tone appropriate for gestational age    [place] Latch:  Efficiency:               Lips Flanged: Yes              Depth of latch: good, not optimal, excellent on the right breast              Audible Swallow: Yes, sustained SSB              Visible Milk: Yes              Wide Open/ Asymmetrical: asymmetrical but could be wider              Suck Swallow Cycle: Breathing: unlabored, Coordinated: yes  Nipple Assessment after latch: left nipple very flat, tender. Right nipple slightly creased along the lower edge of the face.  Latch Problems: Estella experienced latch on pain 5/10 that quickly improved to 2/10 when feeding on the left breast. Estella rated her pain 9/10 when feeding on the right breast even though the latch was wider and deeper and the nipple was less distorted after she fed.  Raghav appeared completely content after feeding.     Position:  Infant's  Ergonomics/Body               Body Alignment: Yes               Head Supported: Yes               Close to Mom's body/ Lifted/ Supported: Yes               Mom's Ergonomics/Body: Yes                           Supported: Yes                           Sitting Back: Yes                           Brings Baby to her breast: Yes  Positioning Problems: Estella positioned aRghav fairly well in cradle hold.  I reminded her to position Raghav with the nipple just below her nose rather than at her lower lip to encourage her to open her mouth wide to latch.       Handouts:   None    Education:  Reviewed Latch: Demonstrated how to gently compress the breast and align the baby so that her nose is just above the nipple with her lower lip and chin touching the breast to encourage the deepest, widest, off-center latch. Discussed that Raghav's tongue function as she sucks is the likely source of Estella's pain  Reviewed Mom/Breast care: reviewed moist wound care for Estella's sore right nipple.        Plan:    Reassurance and support given.  I acknowledged Estella's concerns and her commitment to breastfeeding.  I encouraged her to continue breastfeeding as often as she feels comfortable.  I made some suggestions for positioning to improve Raghav's ability to latch deeply to minimize Estella's pain.  I reassured her it's ok to pump and bottle feed at any time if she needs a break from painful feedings.  I encouraged her to continue with moist wound care until her right nipple has completely healed.  I suggested the trial of using hydrogel pads instead of a guaze non-stick dressing to see if it aids healing and improves Estella's comfort.  A follow up appointment was scheduled to monitor progress.  I encouraged Estella to call with any questions or concerns.    I have spent 60 minutes with Patient and family today in which greater than 50% of this time was spent in counseling/coordination of care regarding Patient and family education and  Counseling / Coordination of care.

## 2024-12-10 NOTE — PATIENT INSTRUCTIONS
Continue breastfeeding as often as you feel comfortable.  Gently compress the breast and align the baby so that her nose is just above the nipple with her lower lip and chin touching the breast to encourage the deepest, widest, off-center latch.   Pump if a feeding at the breast is missed.  To help your nipples heal, in addition to paying close attention to latch, apply protective ointment after feeding or pumping and cover with a non stick dressing or a hydrogel pad. Do this until your nipples have completely healed.   Follow up as scheduled.  Please call with any questions or concerns.

## 2024-12-10 NOTE — PROGRESS NOTES
Assessment & Plan     Normal postpartum exam.     1. Contraception: none  2. Annual exam due in March.   3. Increase activity as tolerated, may resume all normal activity.  4. Lactation consult needed: no; if yes, [facility] information discussed.   5. Some fecal incontinence - referral to pelvic floor PT      Subjective   No chief complaint on file.      Becca Styles is a 44 y.o.  female who presents for a postpartum visit.     Delivery Method: Vaginal, Spontaneous   Delivery Date and Time: 2024 5:22 PM  Delivery Attending: Silvina Shrestha  Gestational Age at delivery: 39w4d   Route of Delivery: Vaginal, Spontaneous      Admitting Diagnoses:   Patient Active Problem List   Diagnosis    Breast lump in female    LIVIER (generalized anxiety disorder)    Current mild episode of major depressive disorder without prior episode (HCC)    Decreased libido    Short interval between pregnancies affecting pregnancy, antepartum    AMA (advanced maternal age) multigravida 35+, third trimester    39 weeks gestation of pregnancy    Polyhydramnios in third trimester     (spontaneous vaginal delivery)       Gestational Diabetes: no  Pregnancy Complications: none    Anesthesia: Epidural,     Delivery: Vaginal, Spontaneous at 2024 5:22 PM  Laceration: Perineal: 2° Repaired? Yes    Baby's Name: Raghav Styles   Baby's Weight: 3795 g (8 lb 5.9 oz); 133.86    Apgar scores: 8  and 9  at 1 and 5 minutes, respectively  Breast or formula: Breastfeeding & pumping & formula   Pediatrician: [facility]     Bleeding spotting. Bowel function: normal. Bladder function: normal. The patient is having any pain.     Patient has not been sexually active. Desired contraception method is none.    Postpartum depression screening: negative. EPDS : 0. She denies depression/anxiety/trouble sleeping.     Last Pap : 2024 ; no abnormalities      The following portions of the patient's history were  "reviewed and updated as appropriate: allergies, current medications, past family history, past medical history, past social history, past surgical history, and problem list.      Current Outpatient Medications:     benzocaine-menthol-lanolin-aloe (DERMOPLAST) 20-0.5 % topical spray, Apply 1 Application topically every 6 (six) hours as needed for mild pain or irritation (Patient not taking: Reported on 12/10/2024), Disp: , Rfl:     diphenhydrAMINE (BENADRYL) 50 mg/mL, Inject 0.5 mL (25 mg total) into a catheter in a vein every 6 (six) hours as needed for itching or allergies (Patient not taking: Reported on 12/10/2024), Disp: , Rfl:     docusate sodium (COLACE) 100 mg capsule, Take 1 capsule (100 mg total) by mouth 2 (two) times a day (Patient not taking: Reported on 12/10/2024), Disp: , Rfl:     hydrocortisone 1 % cream, Apply 1 Application topically daily as needed for rash or irritation (Patient not taking: Reported on 12/10/2024), Disp: , Rfl:     Prenatal Vit-Fe Fumarate-FA (PRENATAL VITAMIN PO), Take by mouth, Disp: , Rfl:     witch hazel-glycerin (TUCKS) topical pad, Apply 1 Pad topically every 4 (four) hours as needed for irritation (Patient not taking: Reported on 12/10/2024), Disp: , Rfl:     No Known Allergies    Review of Systems  Review of Systems   Constitutional:  Positive for fatigue.   HENT: Negative.     Respiratory: Negative.     Cardiovascular: Negative.    Gastrointestinal: Negative.    Genitourinary:  Negative for pelvic pain, vaginal discharge and vaginal pain.   Neurological: Negative.    Psychiatric/Behavioral: Negative.           Objective      Ht 5' 4\" (1.626 m)   Wt 66.7 kg (147 lb)   LMP 02/09/2024   Breastfeeding Yes Comment: & pumping & formula  BMI 25.23 kg/m²     Physical Exam  Constitutional:       Appearance: Normal appearance.   HENT:      Head: Normocephalic and atraumatic.   Eyes:      Extraocular Movements: Extraocular movements intact.      Conjunctiva/sclera: Conjunctivae " normal.      Pupils: Pupils are equal, round, and reactive to light.   Pulmonary:      Effort: Pulmonary effort is normal.   Neurological:      General: No focal deficit present.      Mental Status: She is alert and oriented to person, place, and time.   Skin:     General: Skin is warm and dry.   Vitals and nursing note reviewed.

## 2024-12-11 PROBLEM — O09.899 SHORT INTERVAL BETWEEN PREGNANCIES AFFECTING PREGNANCY, ANTEPARTUM: Status: RESOLVED | Noted: 2024-04-29 | Resolved: 2024-12-11

## 2024-12-11 PROBLEM — O40.3XX0 POLYHYDRAMNIOS IN THIRD TRIMESTER: Status: RESOLVED | Noted: 2024-10-30 | Resolved: 2024-12-11

## 2024-12-11 PROBLEM — O09.523 AMA (ADVANCED MATERNAL AGE) MULTIGRAVIDA 35+, THIRD TRIMESTER: Status: RESOLVED | Noted: 2024-09-16 | Resolved: 2024-12-11

## 2024-12-11 PROBLEM — Z3A.39 39 WEEKS GESTATION OF PREGNANCY: Status: RESOLVED | Noted: 2024-10-21 | Resolved: 2024-12-11

## 2024-12-15 NOTE — PROGRESS NOTES
I have reviewed the notes, assessments, and/or procedures performed by Aleisha Hayes RN, IBCLC, I concur with her/his documentation of Becca Reid MD 12/15/24

## 2024-12-30 ENCOUNTER — OFFICE VISIT (OUTPATIENT)
Age: 44
End: 2024-12-30
Payer: COMMERCIAL

## 2024-12-30 VITALS — SYSTOLIC BLOOD PRESSURE: 122 MMHG | DIASTOLIC BLOOD PRESSURE: 80 MMHG

## 2024-12-30 PROCEDURE — 99215 OFFICE O/P EST HI 40 MIN: CPT | Performed by: PEDIATRICS

## 2024-12-30 NOTE — PROGRESS NOTES
BREAST FEEDING FOLLOW UP VISIT    Informant/Relationship: Estella/mom    Discussion of General Lactation Issues: Estella still has attachment pain on the right, but no longer experiences pain through the full feeding. Attachment pain is currently 6/10 and then decreases to 0-1/10 for the remainder of the feeding. On the left, her pain is 0/10 for the entire feeding. Estella still has healing nipple trauma on the right.    Estella states that Raghav is more effective at the breast. She feels more empty than she had at the breast. Estella does give a bottle of formula once or twice/day if Raghav is still cuing. This typically occurs in the evening.     Infant is 53 days old today.    Interval Breastfeeding History:    Frequency of breast feeding: about 7 times/day  Does mother feel breastfeeding is effective: Yes  Does infant appear satisfied after nursing:Yes, usually, a couple of times seems to need a supplemental bottle  Stooling pattern normal:Yes  Urinating frequently:Yes  Using shield or shells:No    Alternative/Artificial Feedings:   Bottle: Yes, using pacing  Cup: N/A  Syringe/Finger: N/A           Formula Type: [employer] (will be switching to [employer])                     Amount: 2-4 oz            Breast Milk:                      Amount: direct feeding primarily            Frequency 1-2 bottles/day  Elimination Problems: No      Equipment:  Nipple Shield             Type: n/a             Size: n/a             Frequency of Use: n/a  Pump            Type: Has [employer] Go, primary pump; also has the Spectra S9            Frequency of Use: expresses breast milk about 2x/week  Shells            Type: [employer] Milk Catcher            Frequency of use: all day, also catches let down    Equipment Problems: no      Mom:  Breast: Small, wide spaced, normal   Nipple Assessment in General: Normal: elongated/eraser, no discoloration; right nipple with small erosion in the center of the face; left nipple without  damage.  Mother's Awareness of Feeding Cues                 Recognizes: Yes                  Verbalizes: Yes  Support System: HARVEY GOODEN  History of Breastfeeding:  older brother for almost 1 year until restarting fertility to conceive again. Started with difficulty due to his tongue tie; used informal milk sharing to meet his needs   Changes/Stressors/Violence: Follow up of ongoing breastfeeding pain on right nipple with nipple damage  Concerns/Goals: Continue to breastfeed, plan for return to work    Problems with Mom: Nipple damage on right breast; ongoing breastfeeding pain on right breast    Physical Exam  Constitutional:       Appearance: Normal appearance. She is normal weight.   HENT:      Head: Normocephalic and atraumatic.   Eyes:      Extraocular Movements: Extraocular movements intact.   Neurological:      General: No focal deficit present.      Mental Status: She is alert and oriented to person, place, and time.   Psychiatric:         Mood and Affect: Mood normal.         Behavior: Behavior normal.         Thought Content: Thought content normal.         Judgment: Judgment normal.   Vitals and nursing note reviewed.         Infant:  Behaviors: Alert  Color: Healthy  Birth weight: 3.795 kg  Current weight: 4.61 kg    Problems with infant: s/p frenotomy; slightly restricted tongue movement      General Appearance:  Alert, active, no distress                            Head:  Normocephalic, AFOF, sutures opposed                            Eyes:   Conjunctiva clear, no drainage                            Ears:   Normally placed, no anomolies                           Nose:   Septum intact, no drainage or erythema                          Mouth:  No lesions                   Neck:  Supple, symmetrical, trachea midline, no adenopathy; thyroid: no enlargement, symmetric, no tenderness/mass/nodules                Respiratory:  No grunting, flaring, retractions, breath sounds clear and equal            Cardiovascular:  Regular rate and rhythm. No murmur. Adequate perfusion/capillary refill. Femoral pulse present                  Abdomen:    Soft, non-tender, no masses, bowel sounds present, no HSM            Genitourinary:  Normal female genitalia, anus patent                         Spine:   No abnormalities noted       Musculoskeletal:   Full range of motion         Skin/Hair/Nails:   Skin warm, dry, and intact, no rashes or abnormal dyspigmentation or lesions               Neurologic:   No abnormal movement, tone appropriate for gestational age     Latch:  Efficiency:               Lips Flanged: Yes              Depth of latch: Excellent on the left, very good on the right              Audible Swallow: Yes, sustained SSB with brief moments of fasciculation noted              Visible Milk: Yes              Wide Open/ Asymmetrical: Yes              Suck Swallow Cycle: Breathing: Unlabored, Coordinated: Yes  Nipple Assessment after latch: Normal: elongated/eraser, no discoloration and no damage noted.  Latch Problems: Estella easily assists Raghav to a wide, deep, asymmetrical, and comfortable attachment at both breasts where she quickly attains a sustained SSB with brief interludes of fasciculation and breastfeeds until fully content.    Position:  Infant's Ergonomics/Body               Body Alignment: Yes               Head Supported: Yes               Close to Mom's body/ Lifted/ Supported: Yes               Mom's Ergonomics/Body: Yes                           Supported: Yes                           Sitting Back: Yes                           Brings Baby to her breast: Yes  Positioning Problems: None        Education:  Reviewed Latch: Reviewed how to gently compress the breast as if offering a sandwich to facilitate a deeper latch.    Reviewed Positioning for Dyad: Reviewed how to bring baby to the breast so that her lower lip and chin touch the breast with her nose just above the nipple to encourage a  "wider, more asymmetric latch.   Reviewed Frequency/Supply & Demand: Recommended feeding on demand: when the baby gives hunger cues, when the breasts feel full, every 3 hours during the day and every 5 hours at night counting from the beginning of one feeding to the beginning of the next; whichever comes first.    Reviewed Alternative/Artificial Feedings: paced bottle feeding  Reviewed Mom/Breast care: Reviewed wet wound healing, including using the [employer] milk collectors to help protect the nipple; consider the use of torbangun to help build milk production        Plan:  Discussed history and physical exams with Estella. Gave support for her commitment to providing breast milk for Raghav. Reassurance given that she is producing most of the milk that Raghav needs, and that Raghav is not gaining weight due to the small amount of supplemental formula that she receives. Reviewed the various reasons that babies will want to feed frequently in the evenings and gave support for any continued supplement offered.    Recommended expressing breast milk as feels doable to collect milk to build a \"safety net\" for planned or unplanned separations. Recommended continued wet wound healing for the right nipple. Discussed the use of torbangun to help increase sensitivity to milk expression/breastfeeding and build milk production.    Encouraged considering some milk expression early in the day, after breastfeeding, to build a \"safety net\" for planned or unplanned separations or to provide supplement for later use. Additional lactation support as needed.     I have spent 50 minutes with Patient  today in which greater than 50% of this time was spent in counseling/coordination of care regarding Prognosis, Risks and benefits of tx options, Instructions for management, Patient and family education, Importance of tx compliance, Risk factor reductions, Impressions, Counseling / Coordination of care, Documenting in the medical record, Reviewing / " ordering tests, medicine, procedures  , and Obtaining or reviewing history  .

## 2024-12-30 NOTE — PATIENT INSTRUCTIONS
"Continue to use \"wet wound healing\" on the right nipple. Wearing the nipple shells to help keep the right nipple everted and the wound open for \"secondary healing\" will speed the process.    May try Torbangun, marketed as Boob Food Too from [employer] at [employer], or [employer]. The dosing is on the bottle. Use the Boob Food Too, not Boob Food, for better results and fewer side effects.       "

## 2025-03-03 ENCOUNTER — OFFICE VISIT (OUTPATIENT)
Dept: FAMILY MEDICINE CLINIC | Facility: CLINIC | Age: 45
End: 2025-03-03
Payer: COMMERCIAL

## 2025-03-03 VITALS
OXYGEN SATURATION: 98 % | DIASTOLIC BLOOD PRESSURE: 70 MMHG | TEMPERATURE: 97.6 F | BODY MASS INDEX: 25.27 KG/M2 | WEIGHT: 148 LBS | HEART RATE: 67 BPM | HEIGHT: 64 IN | RESPIRATION RATE: 17 BRPM | SYSTOLIC BLOOD PRESSURE: 100 MMHG

## 2025-03-03 DIAGNOSIS — Z12.31 ENCOUNTER FOR SCREENING MAMMOGRAM FOR BREAST CANCER: ICD-10-CM

## 2025-03-03 DIAGNOSIS — Z00.00 ANNUAL PHYSICAL EXAM: Primary | ICD-10-CM

## 2025-03-03 PROCEDURE — 99396 PREV VISIT EST AGE 40-64: CPT | Performed by: FAMILY MEDICINE

## 2025-03-03 NOTE — PROGRESS NOTES
Adult Annual Physical  Name: Becca Sytles      : 1980      MRN: [id number]  Encounter Provider: Allison Queen MD  Encounter Date: 3/3/2025   Encounter department: [facility]    Assessment & Plan  Annual physical exam    Orders:  •  Comprehensive metabolic panel  •  CBC and differential  •  Lipid panel  •  Hemoglobin A1C    Encounter for screening mammogram for breast cancer    Orders:  •  Mammo screening bilateral w 3d and cad; Future    Immunizations and preventive care screenings were discussed with patient today. Appropriate education was printed on patient's after visit summary.    Counseling:  Alcohol/drug use: discussed moderation in alcohol intake, the recommendations for healthy alcohol use, and avoidance of illicit drug use.  Dental Health: discussed importance of regular tooth brushing, flossing, and dental visits.  Injury prevention: discussed safety/seat belts, safety helmets, smoke detectors, carbon monoxide detectors, and smoking near bedding or upholstery.  Sexual health: discussed sexually transmitted diseases, partner selection, use of condoms, avoidance of unintended pregnancy, and contraceptive alternatives.  Exercise: the importance of regular exercise/physical activity was discussed. Recommend exercise 3-5 times per week for at least 30 minutes.          History of Present Illness     Adult Annual Physical:  Patient presents for annual physical.     Diet and Physical Activity:  - Diet/Nutrition: well balanced diet.  - Exercise: moderate cardiovascular exercise.    General Health:  - Sleep: sleeps well and 7-8 hours of sleep on average.  - Hearing: normal hearing bilateral ears.  - Vision: most recent eye exam < 1 year ago and wears contacts.  - Dental: regular dental visits and brushes teeth twice daily.    /GYN Health:  - Follows with GYN: yes.   - Menopause: premenopausal.   - History of STDs: no    Advanced Care Planning:  - Has an advanced directive?: no    -  Has a durable medical POA?: no    - ACP document given to patient?: no      Review of Systems   Constitutional: Negative.  Negative for chills and fever.   HENT: Negative.  Negative for ear pain and sore throat.    Eyes: Negative.  Negative for pain and visual disturbance.   Respiratory: Negative.  Negative for cough and shortness of breath.    Cardiovascular: Negative.  Negative for chest pain and palpitations.   Gastrointestinal:  Negative for abdominal pain and vomiting.   Endocrine: Negative.    Genitourinary: Negative.  Negative for dysuria and hematuria.   Musculoskeletal: Negative.  Negative for arthralgias and back pain.   Skin:  Negative for color change and rash.   Allergic/Immunologic: Negative.    Neurological: Negative.  Negative for seizures and syncope.   Hematological: Negative.    Psychiatric/Behavioral: Negative.     All other systems reviewed and are negative.    Medical History Reviewed by provider this encounter:  Allergies  Meds  Problems     .  Past Medical History   Past Medical History:   Diagnosis Date   • Anxiety    • Female infertility 2021   • Miscarriage 1997, 2021, 2022   • Pregnancy resulting from in vitro fertilization in third trimester 08/22/2022   • Varicella     as child     Past Surgical History:   Procedure Laterality Date   • WISDOM TOOTH EXTRACTION       Family History   Problem Relation Age of Onset   • Depression Mother    • Mental illness Mother    • Thyroid disease Mother    • Diabetes Father         My father needed daily insulin shots. He was later able to control it by diet and no longer required shots   • Liver cancer Father    • Cancer Father         My father had liver cancer due to cirrhosis of the liver.  He passed away in January of 2011.   • Early death Father    • No Known Problems Brother    • No Known Problems Brother    • No Known Problems Brother    • No Known Problems Son    • Schizophrenia Maternal Grandmother    • Mental illness Maternal Grandmother     • No Known Problems Maternal Grandfather    • Heart disease Paternal Grandmother    • Early death Paternal Grandmother 65   • Heart attack Paternal Grandmother         several   • Heart failure Paternal Grandmother         My grandmother had 3 major heart attacks and 2 heart surgeries and passed away at the age of 65 after her 4th heart attack.   • No Known Problems Paternal Grandfather    • Depression Maternal Aunt    • Anxiety disorder Maternal Aunt    • Mental illness Maternal Aunt    • Completed Suicide  Maternal Uncle    • No Known Problems Paternal Aunt    • No Known Problems Paternal Uncle    • Bipolar disorder Cousin    • Schizoaffective Disorder  Cousin    • Suicide Attempts Cousin    • Mental illness Cousin    • Suicide Attempts Cousin    • Mental illness Cousin    • Mental illness Cousin    • Suicide Attempts Cousin       reports that she quit smoking about 25 years ago. Her smoking use included cigarettes. She has never been exposed to tobacco smoke. She has never used smokeless tobacco. She reports current alcohol use of about 2.0 standard drinks of alcohol per week. She reports that she does not use drugs.  Current Outpatient Medications   Medication Instructions   • Prenatal Vit-Fe Fumarate-FA (PRENATAL VITAMIN PO) Take by mouth   No Known Allergies   Current Outpatient Medications on File Prior to Visit   Medication Sig Dispense Refill   • Prenatal Vit-Fe Fumarate-FA (PRENATAL VITAMIN PO) Take by mouth       No current facility-administered medications on file prior to visit.      Social History     Tobacco Use   • Smoking status: Former     Current packs/day: 0.00     Types: Cigarettes     Quit date: 1999     Years since quittin.5     Passive exposure: Never   • Smokeless tobacco: Never   • Tobacco comments:     Used to smoke socially never consistently on a daily basis   Vaping Use   • Vaping status: Never Used   Substance and Sexual Activity   • Alcohol use: Yes     Alcohol/week: 2.0  "standard drinks of alcohol     Types: 2 Glasses of wine per week     Comment: social, none since pregnancy   • Drug use: Never   • Sexual activity: Yes     Partners: Female     Birth control/protection: None       Objective   /70 (BP Location: Left arm, Patient Position: Sitting, Cuff Size: Standard)   Pulse 67   Temp 97.6 °F (36.4 °C) (Tympanic)   Resp 17   Ht 5' 4.25\" (1.632 m)   Wt 67.1 kg (148 lb)   LMP  (LMP Unknown)   SpO2 98%   Breastfeeding Yes   BMI 25.21 kg/m²     Physical Exam  Vitals and nursing note reviewed.   Constitutional:       Appearance: Normal appearance. She is well-developed.   HENT:      Head: Normocephalic and atraumatic.      Right Ear: Tympanic membrane normal.      Left Ear: Tympanic membrane normal.      Nose: Nose normal.      Mouth/Throat:      Mouth: Mucous membranes are moist.   Eyes:      Pupils: Pupils are equal, round, and reactive to light.   Neck:      Thyroid: No thyromegaly.   Cardiovascular:      Rate and Rhythm: Normal rate and regular rhythm.      Heart sounds: No murmur heard.  Pulmonary:      Effort: Pulmonary effort is normal.      Breath sounds: Normal breath sounds.   Abdominal:      General: Bowel sounds are normal.      Palpations: Abdomen is soft.   Musculoskeletal:         General: No deformity. Normal range of motion.      Cervical back: Normal range of motion and neck supple.   Skin:     General: Skin is warm.      Capillary Refill: Capillary refill takes less than 2 seconds.      Findings: No erythema or rash.   Neurological:      General: No focal deficit present.      Mental Status: She is alert and oriented to person, place, and time.      Deep Tendon Reflexes: Reflexes are normal and symmetric.   Psychiatric:         Mood and Affect: Mood normal.         Behavior: Behavior normal.         "
